# Patient Record
Sex: MALE | Race: WHITE | Employment: OTHER | URBAN - NONMETROPOLITAN AREA
[De-identification: names, ages, dates, MRNs, and addresses within clinical notes are randomized per-mention and may not be internally consistent; named-entity substitution may affect disease eponyms.]

---

## 2017-01-10 ENCOUNTER — HOSPITAL ENCOUNTER (OUTPATIENT)
Age: 72
Setting detail: OBSERVATION
Discharge: HOME | End: 2017-01-11
Attending: INTERNAL MEDICINE | Admitting: INTERNAL MEDICINE
Payer: MEDICARE

## 2017-01-10 LAB — TROPONIN: <0.01 NG/ML (ref 0–0.03)

## 2017-01-10 PROCEDURE — 2580000003 HC RX 258: Performed by: INTERNAL MEDICINE

## 2017-01-10 PROCEDURE — 96365 THER/PROPH/DIAG IV INF INIT: CPT

## 2017-01-10 PROCEDURE — G0378 HOSPITAL OBSERVATION PER HR: HCPCS

## 2017-01-10 PROCEDURE — 93005 ELECTROCARDIOGRAM TRACING: CPT

## 2017-01-10 PROCEDURE — 2500000003 HC RX 250 WO HCPCS: Performed by: INTERNAL MEDICINE

## 2017-01-10 PROCEDURE — 6370000000 HC RX 637 (ALT 250 FOR IP): Performed by: INTERNAL MEDICINE

## 2017-01-10 RX ORDER — BENAZEPRIL HYDROCHLORIDE 20 MG/1
20 TABLET ORAL NIGHTLY
COMMUNITY
Start: 2017-01-10

## 2017-01-10 RX ORDER — CITALOPRAM 20 MG/1
20 TABLET ORAL NIGHTLY
Status: DISCONTINUED | OUTPATIENT
Start: 2017-01-10 | End: 2017-01-11 | Stop reason: HOSPADM

## 2017-01-10 RX ORDER — MELOXICAM 15 MG/1
15 TABLET ORAL DAILY
COMMUNITY
Start: 2017-01-11

## 2017-01-10 RX ORDER — CLINDAMYCIN HYDROCHLORIDE 150 MG/1
150 CAPSULE ORAL EVERY 6 HOURS
Status: DISCONTINUED | OUTPATIENT
Start: 2017-01-10 | End: 2017-01-11

## 2017-01-10 RX ORDER — BENAZEPRIL HYDROCHLORIDE 10 MG/1
20 TABLET ORAL NIGHTLY
Status: DISCONTINUED | OUTPATIENT
Start: 2017-01-10 | End: 2017-01-10 | Stop reason: CLARIF

## 2017-01-10 RX ORDER — CITALOPRAM 20 MG/1
20 TABLET ORAL NIGHTLY
COMMUNITY
Start: 2017-01-10

## 2017-01-10 RX ORDER — ATORVASTATIN CALCIUM 80 MG/1
80 TABLET, FILM COATED ORAL NIGHTLY
Status: DISCONTINUED | OUTPATIENT
Start: 2017-01-10 | End: 2017-01-11 | Stop reason: HOSPADM

## 2017-01-10 RX ORDER — AMLODIPINE BESYLATE 5 MG/1
5 TABLET ORAL NIGHTLY
COMMUNITY
Start: 2017-01-10

## 2017-01-10 RX ORDER — CLINDAMYCIN HYDROCHLORIDE 150 MG/1
150 CAPSULE ORAL EVERY 6 HOURS
COMMUNITY
Start: 2017-01-11

## 2017-01-10 RX ORDER — TRAZODONE HYDROCHLORIDE 50 MG/1
50 TABLET ORAL NIGHTLY
Status: DISCONTINUED | OUTPATIENT
Start: 2017-01-10 | End: 2017-01-11 | Stop reason: HOSPADM

## 2017-01-10 RX ORDER — MELOXICAM 7.5 MG/1
15 TABLET ORAL DAILY
Status: DISCONTINUED | OUTPATIENT
Start: 2017-01-11 | End: 2017-01-11 | Stop reason: HOSPADM

## 2017-01-10 RX ORDER — METOPROLOL SUCCINATE 25 MG/1
25 TABLET, EXTENDED RELEASE ORAL NIGHTLY
Status: ON HOLD | COMMUNITY
Start: 2017-01-10 | End: 2017-01-11 | Stop reason: HOSPADM

## 2017-01-10 RX ORDER — AMLODIPINE BESYLATE 5 MG/1
5 TABLET ORAL NIGHTLY
Status: DISCONTINUED | OUTPATIENT
Start: 2017-01-10 | End: 2017-01-11 | Stop reason: HOSPADM

## 2017-01-10 RX ORDER — ROSUVASTATIN CALCIUM 10 MG/1
20 TABLET, COATED ORAL NIGHTLY
Status: DISCONTINUED | OUTPATIENT
Start: 2017-01-10 | End: 2017-01-10 | Stop reason: CLARIF

## 2017-01-10 RX ORDER — LISINOPRIL 20 MG/1
20 TABLET ORAL NIGHTLY
Status: DISCONTINUED | OUTPATIENT
Start: 2017-01-10 | End: 2017-01-11 | Stop reason: HOSPADM

## 2017-01-10 RX ORDER — METOPROLOL SUCCINATE 25 MG/1
25 TABLET, EXTENDED RELEASE ORAL NIGHTLY
Status: DISCONTINUED | OUTPATIENT
Start: 2017-01-10 | End: 2017-01-11

## 2017-01-10 RX ORDER — HYDROCHLOROTHIAZIDE 12.5 MG/1
12.5 TABLET ORAL NIGHTLY
COMMUNITY
Start: 2017-01-10

## 2017-01-10 RX ORDER — ROSUVASTATIN CALCIUM 20 MG/1
20 TABLET, COATED ORAL NIGHTLY
COMMUNITY
Start: 2017-01-10

## 2017-01-10 RX ORDER — TRAZODONE HYDROCHLORIDE 50 MG/1
50 TABLET ORAL NIGHTLY
COMMUNITY

## 2017-01-10 RX ORDER — HYDROCHLOROTHIAZIDE 12.5 MG/1
12.5 CAPSULE, GELATIN COATED ORAL NIGHTLY
Status: DISCONTINUED | OUTPATIENT
Start: 2017-01-10 | End: 2017-01-11 | Stop reason: HOSPADM

## 2017-01-10 RX ADMIN — METOPROLOL SUCCINATE 25 MG: 25 TABLET, EXTENDED RELEASE ORAL at 23:02

## 2017-01-10 RX ADMIN — CITALOPRAM HYDROBROMIDE 20 MG: 20 TABLET ORAL at 23:03

## 2017-01-10 RX ADMIN — AMLODIPINE BESYLATE 5 MG: 5 TABLET ORAL at 23:03

## 2017-01-10 RX ADMIN — TRAZODONE HYDROCHLORIDE 50 MG: 50 TABLET ORAL at 23:02

## 2017-01-10 RX ADMIN — DILTIAZEM HYDROCHLORIDE 5 MG/HR: 5 INJECTION INTRAVENOUS at 23:01

## 2017-01-10 RX ADMIN — ATORVASTATIN CALCIUM 80 MG: 80 TABLET, FILM COATED ORAL at 23:02

## 2017-01-10 RX ADMIN — HYDROCHLOROTHIAZIDE 12.5 MG: 12.5 CAPSULE ORAL at 23:03

## 2017-01-10 RX ADMIN — CLINDAMYCIN HYDROCHLORIDE 150 MG: 150 CAPSULE ORAL at 22:45

## 2017-01-10 RX ADMIN — LISINOPRIL 20 MG: 20 TABLET ORAL at 23:02

## 2017-01-10 ASSESSMENT — PAIN SCALES - GENERAL: PAINLEVEL_OUTOF10: 0

## 2017-01-11 VITALS
SYSTOLIC BLOOD PRESSURE: 100 MMHG | WEIGHT: 239.8 LBS | OXYGEN SATURATION: 91 % | HEART RATE: 66 BPM | RESPIRATION RATE: 16 BRPM | TEMPERATURE: 97 F | DIASTOLIC BLOOD PRESSURE: 58 MMHG

## 2017-01-11 PROBLEM — I11.9 HYPERTENSIVE CARDIOVASCULAR DISEASE: Status: ACTIVE | Noted: 2017-01-11

## 2017-01-11 PROBLEM — Z82.49 FAMILY HISTORY OF EARLY CAD: Status: ACTIVE | Noted: 2017-01-11

## 2017-01-11 PROBLEM — I48.0 PAF (PAROXYSMAL ATRIAL FIBRILLATION) (HCC): Status: ACTIVE | Noted: 2017-01-11

## 2017-01-11 LAB
ALBUMIN SERPL-MCNC: 3.5 G/DL (ref 3.5–5.2)
ALP BLD-CCNC: 61 U/L (ref 40–130)
ALT SERPL-CCNC: 22 U/L (ref 5–41)
ANION GAP SERPL CALCULATED.3IONS-SCNC: 14 MMOL/L (ref 7–19)
AST SERPL-CCNC: 15 U/L (ref 5–40)
BASOPHILS ABSOLUTE: 0 K/UL (ref 0–0.2)
BASOPHILS RELATIVE PERCENT: 0.2 % (ref 0–1)
BILIRUB SERPL-MCNC: 0.6 MG/DL (ref 0.2–1.2)
BUN BLDV-MCNC: 15 MG/DL (ref 8–23)
CALCIUM SERPL-MCNC: 8.6 MG/DL (ref 8.8–10.2)
CHLORIDE BLD-SCNC: 97 MMOL/L (ref 98–111)
CHOLESTEROL, TOTAL: 165 MG/DL (ref 160–199)
CO2: 25 MMOL/L (ref 22–29)
CREAT SERPL-MCNC: 1 MG/DL (ref 0.5–1.2)
EOSINOPHILS ABSOLUTE: 0.2 K/UL (ref 0–0.6)
EOSINOPHILS RELATIVE PERCENT: 3.2 % (ref 0–5)
GFR NON-AFRICAN AMERICAN: >60
GLOBULIN: 2.8 G/DL
GLUCOSE BLD-MCNC: 118 MG/DL (ref 74–109)
HCT VFR BLD CALC: 41.3 % (ref 42–52)
HDLC SERPL-MCNC: 48 MG/DL (ref 55–121)
HEMOGLOBIN: 13.6 G/DL (ref 14–18)
INR BLD: 0.97 (ref 0.88–1.18)
LDL CHOLESTEROL CALCULATED: 85 MG/DL
LV EF: 60 %
LVEF MODALITY: NORMAL
LYMPHOCYTES ABSOLUTE: 1.6 K/UL (ref 1.1–4.5)
LYMPHOCYTES RELATIVE PERCENT: 28.3 % (ref 20–40)
MCH RBC QN AUTO: 29.2 PG (ref 27–31)
MCHC RBC AUTO-ENTMCNC: 32.9 G/DL (ref 33–37)
MCV RBC AUTO: 88.8 FL (ref 80–94)
MONOCYTES ABSOLUTE: 0.7 K/UL (ref 0–0.9)
MONOCYTES RELATIVE PERCENT: 12.1 % (ref 0–10)
NEUTROPHILS ABSOLUTE: 3.2 K/UL (ref 1.5–7.5)
NEUTROPHILS RELATIVE PERCENT: 55.8 % (ref 50–65)
PDW BLD-RTO: 14 % (ref 11.5–14.5)
PLATELET # BLD: 212 K/UL (ref 130–400)
PMV BLD AUTO: 10.7 FL (ref 7.4–10.4)
POTASSIUM SERPL-SCNC: 3.6 MMOL/L (ref 3.5–5)
PROTHROMBIN TIME: 12.9 SEC (ref 12–14.6)
RBC # BLD: 4.65 M/UL (ref 4.7–6.1)
SODIUM BLD-SCNC: 136 MMOL/L (ref 136–145)
T4 FREE: 1.2 NG/ML (ref 0.9–1.7)
TOTAL PROTEIN: 6.3 G/DL (ref 6.6–8.7)
TRIGL SERPL-MCNC: 162 MG/DL (ref 150–199)
TROPONIN: <0.01 NG/ML (ref 0–0.03)
TSH SERPL DL<=0.05 MIU/L-ACNC: 1 UIU/ML (ref 0.27–4.2)
WBC # BLD: 5.7 K/UL (ref 4.8–10.8)

## 2017-01-11 PROCEDURE — 80053 COMPREHEN METABOLIC PANEL: CPT

## 2017-01-11 PROCEDURE — 84439 ASSAY OF FREE THYROXINE: CPT

## 2017-01-11 PROCEDURE — 84484 ASSAY OF TROPONIN QUANT: CPT

## 2017-01-11 PROCEDURE — 93005 ELECTROCARDIOGRAM TRACING: CPT

## 2017-01-11 PROCEDURE — 36415 COLL VENOUS BLD VENIPUNCTURE: CPT

## 2017-01-11 PROCEDURE — 93306 TTE W/DOPPLER COMPLETE: CPT

## 2017-01-11 PROCEDURE — 85610 PROTHROMBIN TIME: CPT

## 2017-01-11 PROCEDURE — 85025 COMPLETE CBC W/AUTO DIFF WBC: CPT

## 2017-01-11 PROCEDURE — 99999 PR OFFICE/OUTPT VISIT,PROCEDURE ONLY: CPT | Performed by: INTERNAL MEDICINE

## 2017-01-11 PROCEDURE — 96372 THER/PROPH/DIAG INJ SC/IM: CPT

## 2017-01-11 PROCEDURE — 84443 ASSAY THYROID STIM HORMONE: CPT

## 2017-01-11 PROCEDURE — G0378 HOSPITAL OBSERVATION PER HR: HCPCS

## 2017-01-11 PROCEDURE — 80061 LIPID PANEL: CPT

## 2017-01-11 PROCEDURE — 6360000002 HC RX W HCPCS: Performed by: INTERNAL MEDICINE

## 2017-01-11 PROCEDURE — 6370000000 HC RX 637 (ALT 250 FOR IP): Performed by: INTERNAL MEDICINE

## 2017-01-11 PROCEDURE — 99220 PR INITIAL OBSERVATION CARE/DAY 70 MINUTES: CPT | Performed by: INTERNAL MEDICINE

## 2017-01-11 RX ORDER — SODIUM CHLORIDE 0.9 % (FLUSH) 0.9 %
10 SYRINGE (ML) INJECTION PRN
Status: DISCONTINUED | OUTPATIENT
Start: 2017-01-11 | End: 2017-01-11 | Stop reason: HOSPADM

## 2017-01-11 RX ORDER — ACETAMINOPHEN 325 MG/1
650 TABLET ORAL EVERY 4 HOURS PRN
Status: DISCONTINUED | OUTPATIENT
Start: 2017-01-11 | End: 2017-01-11 | Stop reason: HOSPADM

## 2017-01-11 RX ORDER — ONDANSETRON 2 MG/ML
4 INJECTION INTRAMUSCULAR; INTRAVENOUS EVERY 6 HOURS PRN
Status: DISCONTINUED | OUTPATIENT
Start: 2017-01-11 | End: 2017-01-11 | Stop reason: HOSPADM

## 2017-01-11 RX ORDER — METOPROLOL SUCCINATE 50 MG/1
50 TABLET, EXTENDED RELEASE ORAL NIGHTLY
Status: DISCONTINUED | OUTPATIENT
Start: 2017-01-11 | End: 2017-01-11 | Stop reason: HOSPADM

## 2017-01-11 RX ORDER — CLINDAMYCIN HYDROCHLORIDE 150 MG/1
300 CAPSULE ORAL EVERY 6 HOURS SCHEDULED
Status: DISCONTINUED | OUTPATIENT
Start: 2017-01-11 | End: 2017-01-11 | Stop reason: HOSPADM

## 2017-01-11 RX ORDER — SODIUM CHLORIDE 0.9 % (FLUSH) 0.9 %
10 SYRINGE (ML) INJECTION EVERY 12 HOURS SCHEDULED
Status: DISCONTINUED | OUTPATIENT
Start: 2017-01-11 | End: 2017-01-11 | Stop reason: HOSPADM

## 2017-01-11 RX ORDER — METOPROLOL SUCCINATE 25 MG/1
50 TABLET, EXTENDED RELEASE ORAL NIGHTLY
Qty: 30 TABLET | Refills: 3 | Status: CANCELLED | OUTPATIENT
Start: 2017-01-11

## 2017-01-11 RX ADMIN — ENOXAPARIN SODIUM 40 MG: 40 INJECTION SUBCUTANEOUS at 09:05

## 2017-01-11 RX ADMIN — CLINDAMYCIN HYDROCHLORIDE 300 MG: 150 CAPSULE ORAL at 12:00

## 2017-01-11 RX ADMIN — CLINDAMYCIN HYDROCHLORIDE 300 MG: 150 CAPSULE ORAL at 05:53

## 2017-01-11 RX ADMIN — MELOXICAM 15 MG: 7.5 TABLET ORAL at 09:04

## 2017-01-11 ASSESSMENT — PAIN SCALES - GENERAL
PAINLEVEL_OUTOF10: 0
PAINLEVEL_OUTOF10: 0
PAINLEVEL_OUTOF10: 2

## 2017-01-12 ENCOUNTER — TELEPHONE (OUTPATIENT)
Dept: CARDIOLOGY | Age: 72
End: 2017-01-12

## 2017-01-12 RX ORDER — METOPROLOL SUCCINATE 50 MG/1
50 TABLET, EXTENDED RELEASE ORAL DAILY
Qty: 30 TABLET | Refills: 3 | Status: SHIPPED | OUTPATIENT
Start: 2017-01-12 | End: 2017-05-01 | Stop reason: SDUPTHER

## 2017-01-17 LAB
EKG P AXIS: 56 DEGREES
EKG P-R INTERVAL: 142 MS
EKG Q-T INTERVAL: 428 MS
EKG QRS DURATION: 84 MS
EKG QTC CALCULATION (BAZETT): 438 MS
EKG T AXIS: 18 DEGREES

## 2017-05-01 RX ORDER — METOPROLOL SUCCINATE 50 MG/1
TABLET, EXTENDED RELEASE ORAL
Qty: 30 TABLET | Refills: 0 | Status: SHIPPED | OUTPATIENT
Start: 2017-05-01

## 2024-04-17 ENCOUNTER — HOSPITAL ENCOUNTER (OUTPATIENT)
Dept: PREADMISSION TESTING | Age: 79
Discharge: HOME | End: 2024-04-21
Payer: MEDICARE

## 2024-04-17 VITALS — BODY MASS INDEX: 33.74 KG/M2 | HEIGHT: 71 IN | WEIGHT: 241 LBS

## 2024-04-17 LAB
ABO/RH: NORMAL
ALBUMIN SERPL-MCNC: 4.5 G/DL (ref 3.5–5.2)
ALP SERPL-CCNC: 70 U/L (ref 40–130)
ALT SERPL-CCNC: 20 U/L (ref 5–41)
ANION GAP SERPL CALCULATED.3IONS-SCNC: 15 MMOL/L (ref 7–19)
ANTIBODY SCREEN: NORMAL
APTT PPP: 38.7 SEC (ref 26–36.2)
AST SERPL-CCNC: 16 U/L (ref 5–40)
BILIRUB SERPL-MCNC: 0.6 MG/DL (ref 0.2–1.2)
BILIRUB UR QL STRIP: NEGATIVE
BUN SERPL-MCNC: 20 MG/DL (ref 8–23)
CALCIUM SERPL-MCNC: 9.4 MG/DL (ref 8.8–10.2)
CHLORIDE SERPL-SCNC: 102 MMOL/L (ref 98–111)
CLARITY UR: CLEAR
CO2 SERPL-SCNC: 25 MMOL/L (ref 22–29)
COLOR UR: YELLOW
CREAT SERPL-MCNC: 1.3 MG/DL (ref 0.5–1.2)
ERYTHROCYTE [DISTWIDTH] IN BLOOD BY AUTOMATED COUNT: 14.8 % (ref 11.5–14.5)
GLUCOSE SERPL-MCNC: 103 MG/DL (ref 74–109)
GLUCOSE UR STRIP.AUTO-MCNC: NEGATIVE MG/DL
HCT VFR BLD AUTO: 45.2 % (ref 42–52)
HGB BLD-MCNC: 14.7 G/DL (ref 14–18)
HGB UR STRIP.AUTO-MCNC: NEGATIVE MG/L
INR PPP: 1.12 (ref 0.88–1.18)
KETONES UR STRIP.AUTO-MCNC: NEGATIVE MG/DL
LEUKOCYTE ESTERASE UR QL STRIP.AUTO: NEGATIVE
MCH RBC QN AUTO: 29.8 PG (ref 27–31)
MCHC RBC AUTO-ENTMCNC: 32.5 G/DL (ref 33–37)
MCV RBC AUTO: 91.7 FL (ref 80–94)
MRSA DNA SPEC QL NAA+PROBE: NOT DETECTED
NITRITE UR QL STRIP.AUTO: NEGATIVE
PH UR STRIP.AUTO: 7.5 [PH] (ref 5–8)
PLATELET # BLD AUTO: 191 K/UL (ref 130–400)
PMV BLD AUTO: 10.5 FL (ref 9.4–12.4)
POTASSIUM SERPL-SCNC: 5.3 MMOL/L (ref 3.5–5)
PROT SERPL-MCNC: 7.1 G/DL (ref 6.6–8.7)
PROT UR STRIP.AUTO-MCNC: NEGATIVE MG/DL
PROTHROMBIN TIME: 14.1 SEC (ref 12–14.6)
RBC # BLD AUTO: 4.93 M/UL (ref 4.7–6.1)
SODIUM SERPL-SCNC: 142 MMOL/L (ref 136–145)
SP GR UR STRIP.AUTO: 1.01 (ref 1–1.03)
UROBILINOGEN UR STRIP.AUTO-MCNC: 0.2 E.U./DL
WBC # BLD AUTO: 6.1 K/UL (ref 4.8–10.8)

## 2024-04-17 PROCEDURE — 85027 COMPLETE CBC AUTOMATED: CPT

## 2024-04-17 PROCEDURE — 86901 BLOOD TYPING SEROLOGIC RH(D): CPT

## 2024-04-17 PROCEDURE — 87641 MR-STAPH DNA AMP PROBE: CPT

## 2024-04-17 PROCEDURE — 86900 BLOOD TYPING SEROLOGIC ABO: CPT

## 2024-04-17 PROCEDURE — 81003 URINALYSIS AUTO W/O SCOPE: CPT

## 2024-04-17 PROCEDURE — 80053 COMPREHEN METABOLIC PANEL: CPT

## 2024-04-17 PROCEDURE — 85610 PROTHROMBIN TIME: CPT

## 2024-04-17 PROCEDURE — 86850 RBC ANTIBODY SCREEN: CPT

## 2024-04-17 PROCEDURE — 85730 THROMBOPLASTIN TIME PARTIAL: CPT

## 2024-04-17 RX ORDER — DEXAMETHASONE SODIUM PHOSPHATE 10 MG/ML
10 INJECTION, SOLUTION INTRAMUSCULAR; INTRAVENOUS ONCE
OUTPATIENT
Start: 2024-05-06

## 2024-04-17 RX ORDER — ROSUVASTATIN CALCIUM 10 MG/1
10 TABLET, COATED ORAL DAILY
COMMUNITY

## 2024-04-17 RX ORDER — AMIODARONE HYDROCHLORIDE 200 MG/1
200 TABLET ORAL DAILY
COMMUNITY

## 2024-04-17 RX ORDER — OXYCODONE HCL 10 MG/1
10 TABLET, FILM COATED, EXTENDED RELEASE ORAL ONCE
OUTPATIENT
Start: 2024-05-06

## 2024-04-17 RX ORDER — METOPROLOL SUCCINATE 25 MG/1
25 TABLET, EXTENDED RELEASE ORAL DAILY
COMMUNITY

## 2024-04-17 RX ORDER — CITALOPRAM HYDROBROMIDE 10 MG/1
5 TABLET ORAL DAILY
COMMUNITY

## 2024-04-17 RX ORDER — BENAZEPRIL HYDROCHLORIDE 10 MG/1
10 TABLET ORAL 2 TIMES DAILY
COMMUNITY

## 2024-04-17 RX ORDER — ACETAMINOPHEN 500 MG
1000 TABLET ORAL ONCE
OUTPATIENT
Start: 2024-05-06

## 2024-04-17 RX ORDER — PREGABALIN 75 MG/1
75 CAPSULE ORAL ONCE
OUTPATIENT
Start: 2024-05-06

## 2024-04-17 RX ORDER — EZETIMIBE 10 MG/1
10 TABLET ORAL DAILY
COMMUNITY

## 2024-04-17 RX ORDER — TRAZODONE HYDROCHLORIDE 50 MG/1
50 TABLET ORAL NIGHTLY
COMMUNITY

## 2024-04-17 RX ORDER — FUROSEMIDE 40 MG/1
40 TABLET ORAL DAILY
COMMUNITY

## 2024-04-17 RX ORDER — CELECOXIB 200 MG/1
200 CAPSULE ORAL ONCE
OUTPATIENT
Start: 2024-05-06

## 2024-04-17 NOTE — DISCHARGE INSTRUCTIONS
BACTROBAN OINTMENT for the NARES    A script for Bactroban ointment has been call to your pharmacy or was given to you in written form by your surgeon.  The guidelines for the ointment use are as follows:    1)  Start using the ointment 7 days before your surgery date    2)  Use the ointment two times a day - morning and night    3)  Place the ointment on a Q-tip and swirl up in your nose making sure you cover completely       the skin just inside of each nostril.  Use one end of the Q-tip for each nostril.           Chlorhexidine Gluconate 4% Solution    Patient should shower with this soap a minimum of 3 consecutive showers (2 nights before surgery, the night before surgery and the morning of surgery) washing from the neck down (avoiding contact with genitalia).      DO NOT WASH YOUR HAIR OR FACE WITH THIS SOAP.  When washing with this soap, apply enough to suds up the body thoroughly, turn the water away from your body and allow the soap suds to remain on the body for 2 full minutes, then rinse body completely.      After using this soap on the body, please do not apply powders or lotions to your body.  After the shower the night before surgery, please dry off with a new towel, sleep in new freshly laundered pj's, and change your bed linen before going to sleep.      IF YOU HAVE A PET IN YOUR HOME, please do not allow your pet to sleep in the bed with you after you have showered with your surgery prep soap.     Please remember that it is not recommended to allow your pet to sleep with you post op, until your incision has healed.  This can increase your risk of post op infection.            The morning of surgery, you may take all your prescribed medications with a sip of water.  Any exceptions to this would be listed below:     DO NOT TAKE YOUR BENAZEPRIL THE MORNING OF SURGERY.            PREOPERATIVE GUIDELINES WHEN RECEIVING ANESTHESIA    Do not eat or drink anything after midnight, the night before your

## 2024-05-06 ENCOUNTER — ANESTHESIA (OUTPATIENT)
Dept: OPERATING ROOM | Age: 79
End: 2024-05-06
Payer: MEDICARE

## 2024-05-06 ENCOUNTER — ANESTHESIA EVENT (OUTPATIENT)
Dept: OPERATING ROOM | Age: 79
End: 2024-05-06
Payer: MEDICARE

## 2024-05-06 ENCOUNTER — APPOINTMENT (OUTPATIENT)
Dept: GENERAL RADIOLOGY | Age: 79
End: 2024-05-06
Attending: ORTHOPAEDIC SURGERY
Payer: MEDICARE

## 2024-05-06 ENCOUNTER — HOSPITAL ENCOUNTER (OUTPATIENT)
Age: 79
Setting detail: OBSERVATION
Discharge: HOME HEALTH | End: 2024-05-07
Attending: ORTHOPAEDIC SURGERY | Admitting: ORTHOPAEDIC SURGERY
Payer: MEDICARE

## 2024-05-06 DIAGNOSIS — M16.11 PRIMARY OSTEOARTHRITIS OF RIGHT HIP: Primary | ICD-10-CM

## 2024-05-06 PROBLEM — M16.9 DEGENERATIVE JOINT DISEASE (DJD) OF HIP: Status: ACTIVE | Noted: 2024-05-06

## 2024-05-06 LAB
ABO/RH: NORMAL
ANTIBODY SCREEN: NORMAL

## 2024-05-06 PROCEDURE — 2720000010 HC SURG SUPPLY STERILE: Performed by: ORTHOPAEDIC SURGERY

## 2024-05-06 PROCEDURE — 3700000000 HC ANESTHESIA ATTENDED CARE: Performed by: ORTHOPAEDIC SURGERY

## 2024-05-06 PROCEDURE — 2500000003 HC RX 250 WO HCPCS: Performed by: NURSE ANESTHETIST, CERTIFIED REGISTERED

## 2024-05-06 PROCEDURE — 6370000000 HC RX 637 (ALT 250 FOR IP): Performed by: ORTHOPAEDIC SURGERY

## 2024-05-06 PROCEDURE — 3600000005 HC SURGERY LEVEL 5 BASE: Performed by: ORTHOPAEDIC SURGERY

## 2024-05-06 PROCEDURE — 6360000002 HC RX W HCPCS: Performed by: ORTHOPAEDIC SURGERY

## 2024-05-06 PROCEDURE — 36415 COLL VENOUS BLD VENIPUNCTURE: CPT

## 2024-05-06 PROCEDURE — 73502 X-RAY EXAM HIP UNI 2-3 VIEWS: CPT

## 2024-05-06 PROCEDURE — 86900 BLOOD TYPING SEROLOGIC ABO: CPT

## 2024-05-06 PROCEDURE — G0378 HOSPITAL OBSERVATION PER HR: HCPCS

## 2024-05-06 PROCEDURE — C1713 ANCHOR/SCREW BN/BN,TIS/BN: HCPCS | Performed by: ORTHOPAEDIC SURGERY

## 2024-05-06 PROCEDURE — C9290 INJ, BUPIVACAINE LIPOSOME: HCPCS | Performed by: ORTHOPAEDIC SURGERY

## 2024-05-06 PROCEDURE — 2580000003 HC RX 258: Performed by: ANESTHESIOLOGY

## 2024-05-06 PROCEDURE — 3600000015 HC SURGERY LEVEL 5 ADDTL 15MIN: Performed by: ORTHOPAEDIC SURGERY

## 2024-05-06 PROCEDURE — 3700000001 HC ADD 15 MINUTES (ANESTHESIA): Performed by: ORTHOPAEDIC SURGERY

## 2024-05-06 PROCEDURE — 2580000003 HC RX 258: Performed by: NURSE ANESTHETIST, CERTIFIED REGISTERED

## 2024-05-06 PROCEDURE — 2580000003 HC RX 258: Performed by: ORTHOPAEDIC SURGERY

## 2024-05-06 PROCEDURE — 6360000002 HC RX W HCPCS: Performed by: NURSE ANESTHETIST, CERTIFIED REGISTERED

## 2024-05-06 PROCEDURE — C1776 JOINT DEVICE (IMPLANTABLE): HCPCS | Performed by: ORTHOPAEDIC SURGERY

## 2024-05-06 PROCEDURE — 7100000001 HC PACU RECOVERY - ADDTL 15 MIN: Performed by: ORTHOPAEDIC SURGERY

## 2024-05-06 PROCEDURE — 94150 VITAL CAPACITY TEST: CPT

## 2024-05-06 PROCEDURE — 7100000000 HC PACU RECOVERY - FIRST 15 MIN: Performed by: ORTHOPAEDIC SURGERY

## 2024-05-06 PROCEDURE — 6370000000 HC RX 637 (ALT 250 FOR IP): Performed by: STUDENT IN AN ORGANIZED HEALTH CARE EDUCATION/TRAINING PROGRAM

## 2024-05-06 PROCEDURE — 86901 BLOOD TYPING SEROLOGIC RH(D): CPT

## 2024-05-06 PROCEDURE — 94760 N-INVAS EAR/PLS OXIMETRY 1: CPT

## 2024-05-06 PROCEDURE — 86850 RBC ANTIBODY SCREEN: CPT

## 2024-05-06 PROCEDURE — 2709999900 HC NON-CHARGEABLE SUPPLY: Performed by: ORTHOPAEDIC SURGERY

## 2024-05-06 DEVICE — CUP ACET DIA54MM HIP GRIPTION PRI CEMENTLESS FIX SECT SER: Type: IMPLANTABLE DEVICE | Site: HIP | Status: FUNCTIONAL

## 2024-05-06 DEVICE — LINER ACET OD54MM ID36MM HIP ALTRX PINN: Type: IMPLANTABLE DEVICE | Site: HIP | Status: FUNCTIONAL

## 2024-05-06 DEVICE — STEM FEM SZ 8 L111MM 12/14 TAPR STD OFFSET HIP DUOFIX CLLRD: Type: IMPLANTABLE DEVICE | Site: HIP | Status: FUNCTIONAL

## 2024-05-06 DEVICE — HEAD FEM DIA36MM +1.5MM OFFSET 12/14 TAPR HIP CERAMIC: Type: IMPLANTABLE DEVICE | Site: HIP | Status: FUNCTIONAL

## 2024-05-06 RX ORDER — DIPHENHYDRAMINE HCL 25 MG
25 TABLET ORAL EVERY 6 HOURS PRN
Status: DISCONTINUED | OUTPATIENT
Start: 2024-05-06 | End: 2024-05-07 | Stop reason: HOSPADM

## 2024-05-06 RX ORDER — PREGABALIN 75 MG/1
75 CAPSULE ORAL ONCE
Status: COMPLETED | OUTPATIENT
Start: 2024-05-06 | End: 2024-05-06

## 2024-05-06 RX ORDER — SODIUM CHLORIDE 9 MG/ML
INJECTION, SOLUTION INTRAVENOUS CONTINUOUS
Status: DISCONTINUED | OUTPATIENT
Start: 2024-05-06 | End: 2024-05-07 | Stop reason: HOSPADM

## 2024-05-06 RX ORDER — ROSUVASTATIN CALCIUM 10 MG/1
10 TABLET, COATED ORAL DAILY
Status: DISCONTINUED | OUTPATIENT
Start: 2024-05-07 | End: 2024-05-07 | Stop reason: HOSPADM

## 2024-05-06 RX ORDER — SODIUM CHLORIDE 0.9 % (FLUSH) 0.9 %
5-40 SYRINGE (ML) INJECTION PRN
Status: CANCELLED | OUTPATIENT
Start: 2024-05-06

## 2024-05-06 RX ORDER — SODIUM CHLORIDE, SODIUM LACTATE, POTASSIUM CHLORIDE, CALCIUM CHLORIDE 600; 310; 30; 20 MG/100ML; MG/100ML; MG/100ML; MG/100ML
INJECTION, SOLUTION INTRAVENOUS CONTINUOUS
Status: DISCONTINUED | OUTPATIENT
Start: 2024-05-06 | End: 2024-05-06 | Stop reason: HOSPADM

## 2024-05-06 RX ORDER — TRANEXAMIC ACID 100 MG/ML
INJECTION, SOLUTION INTRAVENOUS PRN
Status: DISCONTINUED | OUTPATIENT
Start: 2024-05-06 | End: 2024-05-06 | Stop reason: SDUPTHER

## 2024-05-06 RX ORDER — FENTANYL CITRATE 50 UG/ML
INJECTION, SOLUTION INTRAMUSCULAR; INTRAVENOUS PRN
Status: DISCONTINUED | OUTPATIENT
Start: 2024-05-06 | End: 2024-05-06 | Stop reason: SDUPTHER

## 2024-05-06 RX ORDER — SODIUM CHLORIDE, SODIUM LACTATE, POTASSIUM CHLORIDE, AND CALCIUM CHLORIDE .6; .31; .03; .02 G/100ML; G/100ML; G/100ML; G/100ML
1000 INJECTION, SOLUTION INTRAVENOUS ONCE
Status: DISCONTINUED | OUTPATIENT
Start: 2024-05-06 | End: 2024-05-07 | Stop reason: HOSPADM

## 2024-05-06 RX ORDER — HYDROMORPHONE HYDROCHLORIDE 1 MG/ML
0.25 INJECTION, SOLUTION INTRAMUSCULAR; INTRAVENOUS; SUBCUTANEOUS
Status: DISCONTINUED | OUTPATIENT
Start: 2024-05-06 | End: 2024-05-07 | Stop reason: HOSPADM

## 2024-05-06 RX ORDER — OXYCODONE HCL 10 MG/1
10 TABLET, FILM COATED, EXTENDED RELEASE ORAL ONCE
Status: COMPLETED | OUTPATIENT
Start: 2024-05-06 | End: 2024-05-06

## 2024-05-06 RX ORDER — SODIUM CHLORIDE 0.9 % (FLUSH) 0.9 %
5-40 SYRINGE (ML) INJECTION EVERY 12 HOURS SCHEDULED
Status: CANCELLED | OUTPATIENT
Start: 2024-05-06

## 2024-05-06 RX ORDER — TRAMADOL HYDROCHLORIDE 50 MG/1
50 TABLET ORAL EVERY 6 HOURS
Status: DISCONTINUED | OUTPATIENT
Start: 2024-05-06 | End: 2024-05-07 | Stop reason: HOSPADM

## 2024-05-06 RX ORDER — SODIUM CHLORIDE 9 MG/ML
INJECTION, SOLUTION INTRAVENOUS PRN
Status: CANCELLED | OUTPATIENT
Start: 2024-05-06

## 2024-05-06 RX ORDER — LABETALOL HYDROCHLORIDE 5 MG/ML
10 INJECTION, SOLUTION INTRAVENOUS
Status: CANCELLED | OUTPATIENT
Start: 2024-05-06

## 2024-05-06 RX ORDER — DIPHENHYDRAMINE HYDROCHLORIDE 50 MG/ML
12.5 INJECTION INTRAMUSCULAR; INTRAVENOUS
Status: CANCELLED | OUTPATIENT
Start: 2024-05-06 | End: 2024-05-07

## 2024-05-06 RX ORDER — GLYCOPYRROLATE 0.2 MG/ML
INJECTION INTRAMUSCULAR; INTRAVENOUS PRN
Status: DISCONTINUED | OUTPATIENT
Start: 2024-05-06 | End: 2024-05-06 | Stop reason: SDUPTHER

## 2024-05-06 RX ORDER — CELECOXIB 200 MG/1
200 CAPSULE ORAL ONCE
Status: DISCONTINUED | OUTPATIENT
Start: 2024-05-06 | End: 2024-05-06

## 2024-05-06 RX ORDER — PROPOFOL 10 MG/ML
INJECTION, EMULSION INTRAVENOUS PRN
Status: DISCONTINUED | OUTPATIENT
Start: 2024-05-06 | End: 2024-05-06 | Stop reason: SDUPTHER

## 2024-05-06 RX ORDER — SODIUM CHLORIDE 0.9 % (FLUSH) 0.9 %
5-40 SYRINGE (ML) INJECTION PRN
Status: DISCONTINUED | OUTPATIENT
Start: 2024-05-06 | End: 2024-05-07 | Stop reason: HOSPADM

## 2024-05-06 RX ORDER — HYDROMORPHONE HYDROCHLORIDE 1 MG/ML
1 INJECTION, SOLUTION INTRAMUSCULAR; INTRAVENOUS; SUBCUTANEOUS
Status: DISCONTINUED | OUTPATIENT
Start: 2024-05-06 | End: 2024-05-07 | Stop reason: HOSPADM

## 2024-05-06 RX ORDER — ACETAMINOPHEN 325 MG/1
650 TABLET ORAL EVERY 6 HOURS
Status: DISCONTINUED | OUTPATIENT
Start: 2024-05-06 | End: 2024-05-07 | Stop reason: HOSPADM

## 2024-05-06 RX ORDER — FUROSEMIDE 40 MG/1
40 TABLET ORAL DAILY
Status: DISCONTINUED | OUTPATIENT
Start: 2024-05-07 | End: 2024-05-07 | Stop reason: HOSPADM

## 2024-05-06 RX ORDER — SODIUM CHLORIDE 9 MG/ML
INJECTION, SOLUTION INTRAVENOUS PRN
Status: DISCONTINUED | OUTPATIENT
Start: 2024-05-06 | End: 2024-05-07 | Stop reason: HOSPADM

## 2024-05-06 RX ORDER — SENNA AND DOCUSATE SODIUM 50; 8.6 MG/1; MG/1
1 TABLET, FILM COATED ORAL 2 TIMES DAILY
Status: DISCONTINUED | OUTPATIENT
Start: 2024-05-06 | End: 2024-05-07 | Stop reason: HOSPADM

## 2024-05-06 RX ORDER — OXYCODONE HYDROCHLORIDE 5 MG/1
5 TABLET ORAL EVERY 4 HOURS PRN
Status: DISCONTINUED | OUTPATIENT
Start: 2024-05-06 | End: 2024-05-07 | Stop reason: HOSPADM

## 2024-05-06 RX ORDER — POLYETHYLENE GLYCOL 3350 17 G/17G
17 POWDER, FOR SOLUTION ORAL 2 TIMES DAILY
Status: DISCONTINUED | OUTPATIENT
Start: 2024-05-06 | End: 2024-05-07 | Stop reason: HOSPADM

## 2024-05-06 RX ORDER — METOPROLOL SUCCINATE 25 MG/1
25 TABLET, EXTENDED RELEASE ORAL DAILY
Status: DISCONTINUED | OUTPATIENT
Start: 2024-05-07 | End: 2024-05-07 | Stop reason: HOSPADM

## 2024-05-06 RX ORDER — ONDANSETRON 2 MG/ML
INJECTION INTRAMUSCULAR; INTRAVENOUS PRN
Status: DISCONTINUED | OUTPATIENT
Start: 2024-05-06 | End: 2024-05-06 | Stop reason: SDUPTHER

## 2024-05-06 RX ORDER — DEXAMETHASONE SODIUM PHOSPHATE 10 MG/ML
10 INJECTION, SOLUTION INTRAMUSCULAR; INTRAVENOUS ONCE
Status: DISCONTINUED | OUTPATIENT
Start: 2024-05-06 | End: 2024-05-06 | Stop reason: HOSPADM

## 2024-05-06 RX ORDER — 0.9 % SODIUM CHLORIDE 0.9 %
500 INTRAVENOUS SOLUTION INTRAVENOUS PRN
Status: COMPLETED | OUTPATIENT
Start: 2024-05-06 | End: 2024-05-07

## 2024-05-06 RX ORDER — HYDRALAZINE HYDROCHLORIDE 20 MG/ML
10 INJECTION INTRAMUSCULAR; INTRAVENOUS
Status: CANCELLED | OUTPATIENT
Start: 2024-05-06

## 2024-05-06 RX ORDER — HYDROMORPHONE HYDROCHLORIDE 1 MG/ML
0.25 INJECTION, SOLUTION INTRAMUSCULAR; INTRAVENOUS; SUBCUTANEOUS EVERY 5 MIN PRN
Status: DISCONTINUED | OUTPATIENT
Start: 2024-05-06 | End: 2024-05-06 | Stop reason: HOSPADM

## 2024-05-06 RX ORDER — SODIUM CHLORIDE 0.9 % (FLUSH) 0.9 %
5-40 SYRINGE (ML) INJECTION EVERY 12 HOURS SCHEDULED
Status: DISCONTINUED | OUTPATIENT
Start: 2024-05-06 | End: 2024-05-07 | Stop reason: HOSPADM

## 2024-05-06 RX ORDER — NALOXONE HYDROCHLORIDE 0.4 MG/ML
INJECTION, SOLUTION INTRAMUSCULAR; INTRAVENOUS; SUBCUTANEOUS PRN
Status: CANCELLED | OUTPATIENT
Start: 2024-05-06

## 2024-05-06 RX ORDER — DIPHENHYDRAMINE HYDROCHLORIDE 50 MG/ML
25 INJECTION INTRAMUSCULAR; INTRAVENOUS EVERY 6 HOURS PRN
Status: DISCONTINUED | OUTPATIENT
Start: 2024-05-06 | End: 2024-05-07 | Stop reason: HOSPADM

## 2024-05-06 RX ORDER — OXYCODONE HYDROCHLORIDE 10 MG/1
10 TABLET ORAL EVERY 4 HOURS PRN
Status: DISCONTINUED | OUTPATIENT
Start: 2024-05-06 | End: 2024-05-07 | Stop reason: HOSPADM

## 2024-05-06 RX ORDER — TAMSULOSIN HYDROCHLORIDE 0.4 MG/1
0.4 CAPSULE ORAL DAILY
Status: DISCONTINUED | OUTPATIENT
Start: 2024-05-06 | End: 2024-05-07 | Stop reason: HOSPADM

## 2024-05-06 RX ORDER — EZETIMIBE 10 MG/1
10 TABLET ORAL DAILY
Status: DISCONTINUED | OUTPATIENT
Start: 2024-05-07 | End: 2024-05-07 | Stop reason: HOSPADM

## 2024-05-06 RX ORDER — CITALOPRAM 20 MG/1
5 TABLET ORAL DAILY
Status: DISCONTINUED | OUTPATIENT
Start: 2024-05-07 | End: 2024-05-07 | Stop reason: HOSPADM

## 2024-05-06 RX ORDER — ACETAMINOPHEN 500 MG
1000 TABLET ORAL ONCE
Status: COMPLETED | OUTPATIENT
Start: 2024-05-06 | End: 2024-05-06

## 2024-05-06 RX ORDER — IPRATROPIUM BROMIDE AND ALBUTEROL SULFATE 2.5; .5 MG/3ML; MG/3ML
1 SOLUTION RESPIRATORY (INHALATION)
Status: CANCELLED | OUTPATIENT
Start: 2024-05-06 | End: 2024-05-07

## 2024-05-06 RX ORDER — LIDOCAINE HYDROCHLORIDE 10 MG/ML
INJECTION, SOLUTION EPIDURAL; INFILTRATION; INTRACAUDAL; PERINEURAL PRN
Status: DISCONTINUED | OUTPATIENT
Start: 2024-05-06 | End: 2024-05-06 | Stop reason: SDUPTHER

## 2024-05-06 RX ORDER — ROCURONIUM BROMIDE 10 MG/ML
INJECTION, SOLUTION INTRAVENOUS PRN
Status: DISCONTINUED | OUTPATIENT
Start: 2024-05-06 | End: 2024-05-06 | Stop reason: SDUPTHER

## 2024-05-06 RX ORDER — TRAZODONE HYDROCHLORIDE 50 MG/1
50 TABLET ORAL NIGHTLY
Status: DISCONTINUED | OUTPATIENT
Start: 2024-05-06 | End: 2024-05-07 | Stop reason: HOSPADM

## 2024-05-06 RX ORDER — EPHEDRINE SULFATE/0.9% NACL/PF 25 MG/5 ML
SYRINGE (ML) INTRAVENOUS PRN
Status: DISCONTINUED | OUTPATIENT
Start: 2024-05-06 | End: 2024-05-06 | Stop reason: SDUPTHER

## 2024-05-06 RX ORDER — ONDANSETRON 2 MG/ML
4 INJECTION INTRAMUSCULAR; INTRAVENOUS EVERY 6 HOURS PRN
Status: DISCONTINUED | OUTPATIENT
Start: 2024-05-06 | End: 2024-05-07 | Stop reason: HOSPADM

## 2024-05-06 RX ORDER — DEXAMETHASONE SODIUM PHOSPHATE 10 MG/ML
INJECTION, SOLUTION INTRAMUSCULAR; INTRAVENOUS PRN
Status: DISCONTINUED | OUTPATIENT
Start: 2024-05-06 | End: 2024-05-06 | Stop reason: SDUPTHER

## 2024-05-06 RX ORDER — HYDROMORPHONE HYDROCHLORIDE 1 MG/ML
0.5 INJECTION, SOLUTION INTRAMUSCULAR; INTRAVENOUS; SUBCUTANEOUS
Status: DISCONTINUED | OUTPATIENT
Start: 2024-05-06 | End: 2024-05-07 | Stop reason: HOSPADM

## 2024-05-06 RX ORDER — SODIUM CHLORIDE, SODIUM LACTATE, POTASSIUM CHLORIDE, CALCIUM CHLORIDE 600; 310; 30; 20 MG/100ML; MG/100ML; MG/100ML; MG/100ML
INJECTION, SOLUTION INTRAVENOUS CONTINUOUS PRN
Status: DISCONTINUED | OUTPATIENT
Start: 2024-05-06 | End: 2024-05-06 | Stop reason: SDUPTHER

## 2024-05-06 RX ORDER — AMIODARONE HYDROCHLORIDE 200 MG/1
200 TABLET ORAL DAILY
Status: DISCONTINUED | OUTPATIENT
Start: 2024-05-07 | End: 2024-05-07 | Stop reason: HOSPADM

## 2024-05-06 RX ORDER — HYDROMORPHONE HYDROCHLORIDE 1 MG/ML
0.5 INJECTION, SOLUTION INTRAMUSCULAR; INTRAVENOUS; SUBCUTANEOUS EVERY 5 MIN PRN
Status: DISCONTINUED | OUTPATIENT
Start: 2024-05-06 | End: 2024-05-06 | Stop reason: HOSPADM

## 2024-05-06 RX ORDER — ONDANSETRON 4 MG/1
4 TABLET, ORALLY DISINTEGRATING ORAL EVERY 8 HOURS PRN
Status: DISCONTINUED | OUTPATIENT
Start: 2024-05-06 | End: 2024-05-07 | Stop reason: HOSPADM

## 2024-05-06 RX ORDER — METOCLOPRAMIDE HYDROCHLORIDE 5 MG/ML
10 INJECTION INTRAMUSCULAR; INTRAVENOUS
Status: CANCELLED | OUTPATIENT
Start: 2024-05-06 | End: 2024-05-07

## 2024-05-06 RX ADMIN — SUGAMMADEX 200 MG: 100 INJECTION, SOLUTION INTRAVENOUS at 11:54

## 2024-05-06 RX ADMIN — FENTANYL CITRATE 50 MCG: 0.05 INJECTION, SOLUTION INTRAMUSCULAR; INTRAVENOUS at 10:49

## 2024-05-06 RX ADMIN — EPHEDRINE SULFATE 10 MG: 5 INJECTION INTRAVENOUS at 11:40

## 2024-05-06 RX ADMIN — EPHEDRINE SULFATE 10 MG: 5 INJECTION INTRAVENOUS at 11:16

## 2024-05-06 RX ADMIN — ROCURONIUM BROMIDE 50 MG: 10 INJECTION, SOLUTION INTRAVENOUS at 10:08

## 2024-05-06 RX ADMIN — EPHEDRINE SULFATE 15 MG: 5 INJECTION INTRAVENOUS at 10:25

## 2024-05-06 RX ADMIN — TRANEXAMIC ACID 1000 MG: 1 INJECTION, SOLUTION INTRAVENOUS at 11:39

## 2024-05-06 RX ADMIN — OXYCODONE HYDROCHLORIDE 10 MG: 10 TABLET, FILM COATED, EXTENDED RELEASE ORAL at 08:17

## 2024-05-06 RX ADMIN — EPHEDRINE SULFATE 10 MG: 5 INJECTION INTRAVENOUS at 10:39

## 2024-05-06 RX ADMIN — LIDOCAINE HYDROCHLORIDE 50 MG: 10 INJECTION, SOLUTION EPIDURAL; INFILTRATION; INTRACAUDAL; PERINEURAL at 10:03

## 2024-05-06 RX ADMIN — CEFAZOLIN 2000 MG: 2 INJECTION, POWDER, FOR SOLUTION INTRAMUSCULAR; INTRAVENOUS at 10:18

## 2024-05-06 RX ADMIN — SODIUM CHLORIDE: 9 INJECTION, SOLUTION INTRAVENOUS at 13:36

## 2024-05-06 RX ADMIN — PROPOFOL 100 MG: 10 INJECTION, EMULSION INTRAVENOUS at 10:08

## 2024-05-06 RX ADMIN — WATER 2000 MG: 1 INJECTION INTRAMUSCULAR; INTRAVENOUS; SUBCUTANEOUS at 18:02

## 2024-05-06 RX ADMIN — SENNOSIDES, DOCUSATE SODIUM 1 TABLET: 8.6; 5 TABLET ORAL at 20:24

## 2024-05-06 RX ADMIN — PHENYLEPHRINE HYDROCHLORIDE 200 MCG: 10 INJECTION INTRAVENOUS at 11:17

## 2024-05-06 RX ADMIN — ACETAMINOPHEN 650 MG: 325 TABLET ORAL at 14:13

## 2024-05-06 RX ADMIN — SODIUM CHLORIDE, SODIUM LACTATE, POTASSIUM CHLORIDE, AND CALCIUM CHLORIDE: 600; 310; 30; 20 INJECTION, SOLUTION INTRAVENOUS at 10:03

## 2024-05-06 RX ADMIN — TRAZODONE HYDROCHLORIDE 50 MG: 50 TABLET ORAL at 22:25

## 2024-05-06 RX ADMIN — SODIUM CHLORIDE, POTASSIUM CHLORIDE, SODIUM LACTATE AND CALCIUM CHLORIDE: 600; 310; 30; 20 INJECTION, SOLUTION INTRAVENOUS at 08:13

## 2024-05-06 RX ADMIN — APIXABAN 5 MG: 5 TABLET, FILM COATED ORAL at 20:24

## 2024-05-06 RX ADMIN — OXYCODONE 5 MG: 5 TABLET ORAL at 18:09

## 2024-05-06 RX ADMIN — SODIUM CHLORIDE: 9 INJECTION, SOLUTION INTRAVENOUS at 16:17

## 2024-05-06 RX ADMIN — ONDANSETRON 4 MG: 2 INJECTION INTRAMUSCULAR; INTRAVENOUS at 11:47

## 2024-05-06 RX ADMIN — DEXAMETHASONE SODIUM PHOSPHATE 10 MG: 10 INJECTION, SOLUTION INTRAMUSCULAR; INTRAVENOUS at 10:21

## 2024-05-06 RX ADMIN — TRAMADOL HYDROCHLORIDE 50 MG: 50 TABLET ORAL at 14:13

## 2024-05-06 RX ADMIN — TRAMADOL HYDROCHLORIDE 50 MG: 50 TABLET ORAL at 20:24

## 2024-05-06 RX ADMIN — PHENYLEPHRINE HYDROCHLORIDE 100 MCG: 10 INJECTION INTRAVENOUS at 11:34

## 2024-05-06 RX ADMIN — TAMSULOSIN HYDROCHLORIDE 0.4 MG: 0.4 CAPSULE ORAL at 14:13

## 2024-05-06 RX ADMIN — GLYCOPYRROLATE 0.2 MG: 0.2 INJECTION, SOLUTION INTRAMUSCULAR; INTRAVENOUS at 10:55

## 2024-05-06 RX ADMIN — PROPOFOL 50 MG: 10 INJECTION, EMULSION INTRAVENOUS at 10:15

## 2024-05-06 RX ADMIN — SENNOSIDES, DOCUSATE SODIUM 1 TABLET: 8.6; 5 TABLET ORAL at 14:13

## 2024-05-06 RX ADMIN — HYDROMORPHONE HYDROCHLORIDE 0.5 MG: 1 INJECTION, SOLUTION INTRAMUSCULAR; INTRAVENOUS; SUBCUTANEOUS at 10:03

## 2024-05-06 RX ADMIN — ACETAMINOPHEN 1000 MG: 500 TABLET ORAL at 08:17

## 2024-05-06 RX ADMIN — HYDROMORPHONE HYDROCHLORIDE 0.5 MG: 1 INJECTION, SOLUTION INTRAMUSCULAR; INTRAVENOUS; SUBCUTANEOUS at 10:08

## 2024-05-06 RX ADMIN — SODIUM CHLORIDE, SODIUM LACTATE, POTASSIUM CHLORIDE, AND CALCIUM CHLORIDE: 600; 310; 30; 20 INJECTION, SOLUTION INTRAVENOUS at 10:32

## 2024-05-06 RX ADMIN — TRANEXAMIC ACID 1000 MG: 1 INJECTION, SOLUTION INTRAVENOUS at 10:24

## 2024-05-06 RX ADMIN — PREGABALIN 75 MG: 75 CAPSULE ORAL at 08:17

## 2024-05-06 RX ADMIN — ACETAMINOPHEN 650 MG: 325 TABLET ORAL at 20:24

## 2024-05-06 RX ADMIN — PHENYLEPHRINE HYDROCHLORIDE 100 MCG: 10 INJECTION INTRAVENOUS at 11:39

## 2024-05-06 RX ADMIN — POLYETHYLENE GLYCOL 3350 17 G: 17 POWDER, FOR SOLUTION ORAL at 20:23

## 2024-05-06 RX ADMIN — FENTANYL CITRATE 50 MCG: 0.05 INJECTION, SOLUTION INTRAMUSCULAR; INTRAVENOUS at 11:02

## 2024-05-06 ASSESSMENT — PAIN - FUNCTIONAL ASSESSMENT
PAIN_FUNCTIONAL_ASSESSMENT: NONE - DENIES PAIN
PAIN_FUNCTIONAL_ASSESSMENT: NONE - DENIES PAIN

## 2024-05-06 ASSESSMENT — LIFESTYLE VARIABLES: SMOKING_STATUS: 0

## 2024-05-06 NOTE — DISCHARGE INSTR - DIET
Good nutrition is important when healing from an illness, injury, or surgery.  Follow any nutrition recommendations given to you during your hospital stay.   If you were given an oral nutrition supplement while in the hospital, continue to take this supplement at home.  You can take it with meals, in-between meals, and/or before bedtime. These supplements can be purchased at most local grocery stores, pharmacies, and chain [employer]stores.   If you have any questions about your diet or nutrition, call the hospital and ask for the dietitian.            Low carb / High protein

## 2024-05-06 NOTE — PROGRESS NOTES
4 Eyes Skin Assessment     NAME:  Aren Mack  YOB: 1945  MEDICAL RECORD NUMBER:  [id number]    The patient is being assessed for  Admission    I agree that at least one RN has performed a thorough Head to Toe Skin Assessment on the patient. ALL assessment sites listed below have been assessed.      Areas assessed by both nurses:    Head, Face, Ears, Shoulders, Back, Chest, Arms, Elbows, Hands, Sacrum. Buttock, Coccyx, Ischium, and Legs. Feet and Heels        Does the Patient have a Wound? No noted wound(s)       Israel Prevention initiated by RN: No  Wound Care Orders initiated by RN: No    Pressure Injury (Stage 3,4, Unstageable, DTI, NWPT, and Complex wounds) if present, place Wound referral order by RN under : No    New Ostomies, if present place, Ostomy referral order under : No     Nurse 1 eSignature: Electronically signed by Ngoc Pepper RN on 5/6/24 at 1:49 PM CDT    **SHARE this note so that the co-signing nurse can place an eSignature**    Nurse 2 eSignature: Electronically signed by Dayanara Sanchez RN on 5/6/24 at 5:23 PM CDT

## 2024-05-06 NOTE — OP NOTE
[address] [place] [address]                            OPERATIVE REPORT      PATIENT NAME: RAUL OHARA                  : 1945  MED REC NO: [id number]                          ROOM: 0534  ACCOUNT NO: [id number]                       ADMIT DATE: 2024  PROVIDER: Enrike Oneil MD      DATE OF PROCEDURE:  2024    SURGEON:  Enrike Oneil MD    PREOPERATIVE DIAGNOSES:    1. Primary osteoarthritis, right hip.  2. History of previous bilateral superior and inferior pubic rami fractures.  3. Atrial fibrillation, on Eliquis.    POSTOPERATIVE DIAGNOSES:    1. Primary osteoarthritis, right hip.  2. History of previous bilateral superior and inferior pubic rami fractures.  3. Atrial fibrillation, on Eliquis.    PROCEDURES:    1. Right total hip arthroplasty.  2. Use of computer navigation for assessment of leg length and component placement, code 0054T.  Please note that this is an increased complexity due to the fact that the patient was on Eliquis prior to surgery and we did not reverse this.  Also, the patient has some chronic kidney disease with baseline creatinine of 1.3.  This all added about 50% increase in difficulty exposure and placement of implants and pre and postoperative management.    ANESTHESIA:  General.    ESTIMATED BLOOD LOSS:  500 mL.    FLUIDS:  2000 mL crystalloid.    COMPONENTS USED:  DePuy hip system, acetabulum size 54 sector cup, 36 liner, stem size 8 standard offset Actis stem, head is a 36 mm +1.5 ceramic head.    INDICATIONS:  This is a 78-year-old gentleman with progressive arthritis of the right hip, failing conservative care.  Because of this, elected for the above.    DESCRIPTION OF PROCEDURE:  Informed consent was given.  2 g of Ancef, 1 g of tranexamic acid.  Underwent general anesthesia, was placed on the [place] table.  A combined 20-degree internal rotation view was taken of the right hip.  Right hip was  all well within our templating goals.  We used a total of 3 L of irrigation.  Mixed 20 mL of Exparel, 30 mL saline, 50 mL of 0.25% course Marcaine.  Injected the TFL, rectus, and subcutaneous tissues with this solution.  TFL fascia was closed with 0 Vicryl suture, 2-0 Vicryl suture for subcutaneous tissues, and 3-0 Vicryl for subcuticular stitch.  LiquiBand Dermabond soft dressing was applied.  The patient was taken to recovery room in stable condition.    POSTOPERATIVE PLANS:    1. The patient will be on a typical postop protocol.  2. He will be on 6 doses of Ancef.  3. We will continue his eloquis as he was on eloquis prior to surgery.  Please note, his baseline creatinine was 1.3 prior to surgery and we will keep him hydrated as well.          SAMI SONI MD      D:  05/06/2024 12:50:15     T:  05/06/2024 19:13:40     MIKE/JOURDAN  Job #:  [id number]     Doc#:  [id number]

## 2024-05-06 NOTE — ANESTHESIA POSTPROCEDURE EVALUATION
Department of Anesthesiology  Postprocedure Note    Patient: Aren Mack  MRN: [id number]  YOB: 1945  Date of evaluation: 5/6/2024    Procedure Summary       Date: 05/06/24 Room / Location: [address]    Anesthesia Start: 1003 Anesthesia Stop: 1214    Procedure: RIGHT TOTAL HIP ARTHROPLASTY ANTERIOR APPROACH (Right: Hip) Diagnosis:       Primary osteoarthritis of right hip      (Primary osteoarthritis of right hip [M16.11])    Surgeons: Enrike Oneil MD Responsible Provider: Mack Mckeon APRN - CRNA    Anesthesia Type: general ASA Status: 3            Anesthesia Type: No value filed.    Lindsay Phase I: Lindsay Score: 10    Lindsay Phase II:      Anesthesia Post Evaluation    Patient location during evaluation: PACU  Patient participation: complete - patient participated  Level of consciousness: lethargic and awake  Pain score: 0  Airway patency: patent  Nausea & Vomiting: no nausea and no vomiting  Cardiovascular status: hemodynamically stable  Respiratory status: spontaneous ventilation, nonlabored ventilation and face mask  Hydration status: stable  Multimodal analgesia pain management approach    No notable events documented.

## 2024-05-06 NOTE — ANESTHESIA PRE PROCEDURE
dexAMETHasone (PF) (DECADRON) injection 10 mg  10 mg IntraVENous Once Enrike Oneil MD           Allergies:    Allergies   Allergen Reactions   • Ambien [Zolpidem] Other (See Comments)     MAKES ME GO CRAZY-UNAWARE OF WHAT HE IS DOING   • Clarithromycin Rash   • Esomeprazole Rash       Problem List:    Patient Active Problem List   Diagnosis Code   • Hyperlipidemia E78.5   • Hypertension I10   • PAF (paroxysmal atrial fibrillation) (HCC) I48.0   • Family history of early CAD Z82.49   • Hypertensive cardiovascular disease I11.9       Past Medical History:        Diagnosis Date   • Arthritis    • Atrial fibrillation (HCC)    • Family history of early CAD 01/11/2017   • Hyperlipidemia    • Hypertension    • Hypertensive cardiovascular disease 01/11/2017    3/31/2012  Echo  CL[facility], diastolic dysfunction, RVSP 41 mmHg 3/31/2012  SE negative for myocardial ischemia    • Sleep apnea     CPAP       Past Surgical History:        Procedure Laterality Date   • APPENDECTOMY  2023   • COLONOSCOPY  2015   • ENDOSCOPY, COLON, DIAGNOSTIC  2007   • ESOPHAGUS SURGERY     • TOTAL KNEE ARTHROPLASTY Right 2008       Social History:    Social History     Tobacco Use   • Smoking status: Never   • Smokeless tobacco: Never   Substance Use Topics   • Alcohol use: Yes     Comment: socially                                Counseling given: Not Answered      Vital Signs (Current):   Vitals:    05/06/24 0749   BP: (!) 146/77   Pulse: 60   Resp: 16   Temp: 97.5 °F (36.4 °C)   TempSrc: Temporal   SpO2: 98%   Weight: 104.3 kg (230 lb)   Height: 1.803 m (5' 11\")                                              BP Readings from Last 3 Encounters:   05/06/24 (!) 146/77   01/11/17 (!) 100/58       NPO Status: Time of last liquid consumption: 1800                        Time of last solid consumption: 1800                        Date of last liquid consumption: 05/05/24                        Date of last solid food consumption: 05/05/24    BMI:   Wt

## 2024-05-06 NOTE — DISCHARGE INSTRUCTIONS
Dr Oneil Total Hip Replacement  Home Instructions     * Elevate and ice affected extremity as needed for swelling and pain.     Use a barrier ( cloth) between ice pack and skin to prevent frostbite.     *Surgical Site Care:         Cleanse crease once daily with soap and water.         The adhesive dressing (glue strip) can be removed in 2 weeks.          May shower and clean wound on Wednesday but do not scrub incision. Pat dry.          NO tub bathing or swimming.         Wash hands frequently during the day.           *Activity:         SAFETY FIRST walk with a walker         Home Health therapy will come to the house two times a week to make sure you can do things around the house:          Get in and out of your bed          Getting up and down out of the chair          Bathroom  / bathing activities          Do NOT walk for exercise ( it will increase pain and swelling )          Walk several times a day but only for short distances                                                                                                        FEVER of 101.5 or less  *Pain Medicines:                                                                         Take Tylenol x 2          Take prescribed medicine as needed for pain                      Deep breath x 10          Take Tylenol as your pain decreases                                   Cough, Cough, Cough          Take a stool softener of your choice while on pain meds     Recheck in 1-11/2 hours     *To prevent blood clots:          Take prescribed blood thinner as directed.  (Resume Eliquis)          Do ankle pump exercises when sitting in the chair             *To Prevent Pneumonia:           Sit up and take deep breaths several times a day and use the incentive spirometer     *Call the office if:           Increased redness, drainage or an opening at your incision, severe pain, new onset fever or chills.            Also notify of any calf pain, tenderness,

## 2024-05-06 NOTE — H&P
[facility] Pre-Operative History and Physical    Patient Name: Lulu  : 1945    BP (!) 146/77   Pulse 60   Temp 97.5 °F (36.4 °C) (Temporal)   Resp 16   Ht 1.803 m (5' 11\")   Wt 104.3 kg (230 lb)   SpO2 98%   BMI 32.08 kg/m²     Pre-Operative Diagnosis:   djd hip    Proposed Surgical Procedure:   Hip replacement      Past Medical Hisotry:       Diagnosis Date    Arthritis     Atrial fibrillation (HCC)     Family history of early CAD 2017    Hyperlipidemia     Hypertension     Hypertensive cardiovascular disease 2017    3/31/2012  Echo  CLVH, diastolic dysfunction, RVSP 41 mmHg 3/31/2012  SE negative for myocardial ischemia     Sleep apnea     CPAP     Past Surgical History:       Procedure Laterality Date    APPENDECTOMY      COLONOSCOPY      ENDOSCOPY, COLON, DIAGNOSTIC      ESOPHAGUS SURGERY      TOTAL KNEE ARTHROPLASTY Right 2008       Medications:   Prior to Admission medications    Medication Sig Start Date End Date Taking? Authorizing Provider   benazepril (LOTENSIN) 10 MG tablet Take 1 tablet by mouth 2 times daily    Shoaib Garcia MD   ezetimibe (ZETIA) 10 MG tablet Take 1 tablet by mouth daily    Shoaib Garcia MD   citalopram (CELEXA) 10 MG tablet Take 0.5 tablets by mouth daily    Shoaib Garcia MD   amiodarone (CORDARONE) 200 MG tablet Take 1 tablet by mouth daily    Shoaib Garcia MD   traZODone (DESYREL) 50 MG tablet Take 1 tablet by mouth nightly    Shoaib Garcia MD   metoprolol succinate (TOPROL XL) 25 MG extended release tablet Take 1 tablet by mouth daily    Shoaib Garcia MD   rosuvastatin (CRESTOR) 10 MG tablet Take 1 tablet by mouth daily    Shoaib Garcia MD   apixaban (ELIQUIS) 5 MG TABS tablet Take 1 tablet by mouth 2 times daily    Shoaib Garcia MD   furosemide (LASIX) 40 MG tablet Take 1 tablet by mouth daily    Shoaib Garcia MD       Allergies:  Ambien [zolpidem],  masses.    EXTREMITIES: positive exam findings: lateral joint line tenderness, tender over tibial tubercle, ecchymosis noted entire limb and ROM limited to approximately 80 degrees    MUSCULOSKELETAL: Tenderness over right hip(s)    NEUROLOGICAL: speech normal    Diagnostic Studies:      Labs: CBC with Differential:    Lab Results   Component Value Date/Time    WBC 6.1 04/17/2024 10:40 AM    RBC 4.93 04/17/2024 10:40 AM    HGB 14.7 04/17/2024 10:40 AM    HCT 45.2 04/17/2024 10:40 AM    HCT 44.3 03/30/2012 09:35 AM     04/17/2024 10:40 AM     03/30/2012 09:35 AM    MCV 91.7 04/17/2024 10:40 AM    MCH 29.8 04/17/2024 10:40 AM    MCHC 32.5 04/17/2024 10:40 AM    RDW 14.8 04/17/2024 10:40 AM    LYMPHOPCT 28.3 01/11/2017 04:08 AM    MONOPCT 12.1 01/11/2017 04:08 AM    EOSPCT 3.2 01/11/2017 04:08 AM    EOSPCT 2.6 03/30/2012 09:35 AM    BASOPCT 0.2 01/11/2017 04:08 AM    MONOSABS 0.70 01/11/2017 04:08 AM    EOSABS 0.20 01/11/2017 04:08 AM    BASOSABS 0.00 01/11/2017 04:08 AM     BMP:    Lab Results   Component Value Date/Time     04/17/2024 10:40 AM     03/30/2012 09:35 AM    K 5.3 04/17/2024 10:40 AM    K 4.7 03/30/2012 09:35 AM     04/17/2024 10:40 AM     03/30/2012 09:35 AM    CO2 25 04/17/2024 10:40 AM    BUN 20 04/17/2024 10:40 AM    CREATININE 1.3 04/17/2024 10:40 AM    CREATININE 0.9 03/30/2012 09:35 AM    CALCIUM 9.4 04/17/2024 10:40 AM    LABGLOM 56 04/17/2024 10:40 AM    GLUCOSE 103 04/17/2024 10:40 AM     Sodium:    Lab Results   Component Value Date/Time     04/17/2024 10:40 AM     03/30/2012 09:35 AM     Potassium:    Lab Results   Component Value Date/Time    K 5.3 04/17/2024 10:40 AM    K 4.7 03/30/2012 09:35 AM     BUN/Creatinine:    Lab Results   Component Value Date/Time    BUN 20 04/17/2024 10:40 AM    CREATININE 1.3 04/17/2024 10:40 AM    CREATININE 0.9 03/30/2012 09:35 AM     PT/INR:    Lab Results   Component Value Date/Time    PROTIME 14.1 04/17/2024

## 2024-05-06 NOTE — BRIEF OP NOTE
Brief Postoperative Note      Patient: Aren Mack  YOB: 1945  MRN: [id number]    Date of Procedure: 5/6/2024    Pre-Op Diagnosis Codes:     * Primary osteoarthritis of right hip [M16.11]    Post-Op Diagnosis: Same       Procedure(s):  RIGHT TOTAL HIP ARTHROPLASTY ANTERIOR APPROACH    Surgeon(s):  Enrike Oneil MD    Assistant:  First Assistant: Alton Dolan PA-C; Alex Iqbal    Anesthesia: General    Estimated Blood Loss (mL): 500    Complications: None    Specimens:   * No specimens in log *    Implants:  Implant Name Type Inv. Item Serial No.  Lot No. LRB No. Used Action   LINER ACET OD54MM ID36MM HIP ALTRX PINN - [id number]  LINER ACET OD54MM ID36MM HIP ALTRX PINN  [facility] [id number] Right 1 Implanted   STEM FEM SZ 8 L111MM 12/14 TAPR STD OFFSET HIP DUOFIX CLLRD - [id number]  STEM FEM SZ 8 L111MM 12/14 TAPR STD OFFSET HIP DUOFIX CLLRD  [facility] [id number] Right 1 Implanted   HEAD FEM [id number] +1.5MM OFFSET 12/14 TAPR HIP CERAMIC - [id number]  HEAD FEM [id number] +1.5MM OFFSET 12/14 TAPR HIP CERAMIC  [facility] [id number] Right 1 Implanted   CUP ACET [id number] HIP GRIPTION NILTON CEMENTLESS FIX SECT SER - [id number]  CUP ACET [id number] HIP GRIPTION NILTON CEMENTLESS FIX SECT SER  [facility] [id number] Right 1 Implanted         Drains: * No LDAs found *    Findings:  Infection Present At Time Of Surgery (PATOS) (choose all levels that have infection present):  No infection present  Other Findings:     Electronically signed by Enrike Oneil MD on 5/6/2024 at 11:44 AM   Faxed COVID-19 and FLU A&B results to the health department at [phone].     Received fax confirmation. Thank you.

## 2024-05-07 VITALS
RESPIRATION RATE: 12 BRPM | TEMPERATURE: 97.9 F | HEIGHT: 71 IN | SYSTOLIC BLOOD PRESSURE: 100 MMHG | HEART RATE: 66 BPM | WEIGHT: 230 LBS | DIASTOLIC BLOOD PRESSURE: 55 MMHG | BODY MASS INDEX: 32.2 KG/M2 | OXYGEN SATURATION: 98 %

## 2024-05-07 PROBLEM — M16.11 PRIMARY OSTEOARTHRITIS OF RIGHT HIP: Status: ACTIVE | Noted: 2024-05-06

## 2024-05-07 LAB
ANION GAP SERPL CALCULATED.3IONS-SCNC: 10 MMOL/L (ref 7–19)
BUN SERPL-MCNC: 16 MG/DL (ref 8–23)
CALCIUM SERPL-MCNC: 7.7 MG/DL (ref 8.8–10.2)
CHLORIDE SERPL-SCNC: 104 MMOL/L (ref 98–111)
CO2 SERPL-SCNC: 25 MMOL/L (ref 22–29)
CREAT SERPL-MCNC: 1.1 MG/DL (ref 0.5–1.2)
GLUCOSE BLD-MCNC: 138 MG/DL (ref 70–99)
GLUCOSE BLD-MCNC: 143 MG/DL (ref 70–99)
GLUCOSE SERPL-MCNC: 161 MG/DL (ref 74–109)
HBA1C MFR BLD: 5.8 % (ref 4–6)
HCT VFR BLD AUTO: 32.6 % (ref 42–52)
HGB BLD-MCNC: 10.4 G/DL (ref 14–18)
PERFORMED ON: ABNORMAL
PERFORMED ON: ABNORMAL
POTASSIUM SERPL-SCNC: 4.3 MMOL/L (ref 3.5–5)
SODIUM SERPL-SCNC: 139 MMOL/L (ref 136–145)

## 2024-05-07 PROCEDURE — 36415 COLL VENOUS BLD VENIPUNCTURE: CPT

## 2024-05-07 PROCEDURE — 97161 PT EVAL LOW COMPLEX 20 MIN: CPT

## 2024-05-07 PROCEDURE — 82962 GLUCOSE BLOOD TEST: CPT

## 2024-05-07 PROCEDURE — 83036 HEMOGLOBIN GLYCOSYLATED A1C: CPT

## 2024-05-07 PROCEDURE — 97116 GAIT TRAINING THERAPY: CPT

## 2024-05-07 PROCEDURE — 6370000000 HC RX 637 (ALT 250 FOR IP): Performed by: STUDENT IN AN ORGANIZED HEALTH CARE EDUCATION/TRAINING PROGRAM

## 2024-05-07 PROCEDURE — 85018 HEMOGLOBIN: CPT

## 2024-05-07 PROCEDURE — 94760 N-INVAS EAR/PLS OXIMETRY 1: CPT

## 2024-05-07 PROCEDURE — 2580000003 HC RX 258: Performed by: ORTHOPAEDIC SURGERY

## 2024-05-07 PROCEDURE — 6370000000 HC RX 637 (ALT 250 FOR IP): Performed by: ORTHOPAEDIC SURGERY

## 2024-05-07 PROCEDURE — 80048 BASIC METABOLIC PNL TOTAL CA: CPT

## 2024-05-07 PROCEDURE — G0378 HOSPITAL OBSERVATION PER HR: HCPCS

## 2024-05-07 PROCEDURE — 6360000002 HC RX W HCPCS: Performed by: ORTHOPAEDIC SURGERY

## 2024-05-07 PROCEDURE — 96361 HYDRATE IV INFUSION ADD-ON: CPT

## 2024-05-07 PROCEDURE — 96360 HYDRATION IV INFUSION INIT: CPT

## 2024-05-07 PROCEDURE — 85014 HEMATOCRIT: CPT

## 2024-05-07 RX ORDER — INSULIN LISPRO 100 [IU]/ML
0-4 INJECTION, SOLUTION INTRAVENOUS; SUBCUTANEOUS
Status: DISCONTINUED | OUTPATIENT
Start: 2024-05-07 | End: 2024-05-07 | Stop reason: HOSPADM

## 2024-05-07 RX ORDER — DEXTROSE MONOHYDRATE 100 MG/ML
INJECTION, SOLUTION INTRAVENOUS CONTINUOUS PRN
Status: DISCONTINUED | OUTPATIENT
Start: 2024-05-07 | End: 2024-05-07 | Stop reason: HOSPADM

## 2024-05-07 RX ORDER — INSULIN LISPRO 100 [IU]/ML
0-4 INJECTION, SOLUTION INTRAVENOUS; SUBCUTANEOUS NIGHTLY
Status: DISCONTINUED | OUTPATIENT
Start: 2024-05-07 | End: 2024-05-07 | Stop reason: HOSPADM

## 2024-05-07 RX ORDER — OXYCODONE HYDROCHLORIDE 5 MG/1
5 TABLET ORAL SEE ADMIN INSTRUCTIONS
Qty: 90 TABLET | Refills: 0 | Status: SHIPPED | OUTPATIENT
Start: 2024-05-07 | End: 2024-06-13

## 2024-05-07 RX ORDER — ONDANSETRON 4 MG/1
4 TABLET, FILM COATED ORAL EVERY 8 HOURS PRN
Qty: 30 TABLET | Refills: 0 | Status: SHIPPED | OUTPATIENT
Start: 2024-05-07

## 2024-05-07 RX ADMIN — FUROSEMIDE 40 MG: 40 TABLET ORAL at 07:50

## 2024-05-07 RX ADMIN — EZETIMIBE 10 MG: 10 TABLET ORAL at 07:49

## 2024-05-07 RX ADMIN — SODIUM CHLORIDE 500 ML: 9 INJECTION, SOLUTION INTRAVENOUS at 02:47

## 2024-05-07 RX ADMIN — OXYCODONE 5 MG: 5 TABLET ORAL at 02:32

## 2024-05-07 RX ADMIN — ACETAMINOPHEN 650 MG: 325 TABLET ORAL at 07:50

## 2024-05-07 RX ADMIN — WATER 2000 MG: 1 INJECTION INTRAMUSCULAR; INTRAVENOUS; SUBCUTANEOUS at 01:51

## 2024-05-07 RX ADMIN — TRAMADOL HYDROCHLORIDE 50 MG: 50 TABLET ORAL at 01:52

## 2024-05-07 RX ADMIN — ACETAMINOPHEN 650 MG: 325 TABLET ORAL at 01:51

## 2024-05-07 RX ADMIN — POLYETHYLENE GLYCOL 3350 17 G: 17 POWDER, FOR SOLUTION ORAL at 07:50

## 2024-05-07 RX ADMIN — OXYCODONE HYDROCHLORIDE 10 MG: 10 TABLET ORAL at 06:27

## 2024-05-07 RX ADMIN — TRAMADOL HYDROCHLORIDE 50 MG: 50 TABLET ORAL at 07:50

## 2024-05-07 RX ADMIN — APIXABAN 5 MG: 5 TABLET, FILM COATED ORAL at 07:49

## 2024-05-07 RX ADMIN — ROSUVASTATIN 10 MG: 10 TABLET, FILM COATED ORAL at 07:50

## 2024-05-07 RX ADMIN — SENNOSIDES, DOCUSATE SODIUM 1 TABLET: 8.6; 5 TABLET ORAL at 07:49

## 2024-05-07 RX ADMIN — CITALOPRAM 5 MG: 20 TABLET, FILM COATED ORAL at 07:50

## 2024-05-07 RX ADMIN — AMIODARONE HYDROCHLORIDE 200 MG: 200 TABLET ORAL at 07:50

## 2024-05-07 RX ADMIN — TAMSULOSIN HYDROCHLORIDE 0.4 MG: 0.4 CAPSULE ORAL at 07:49

## 2024-05-07 RX ADMIN — WATER 2000 MG: 1 INJECTION INTRAMUSCULAR; INTRAVENOUS; SUBCUTANEOUS at 10:14

## 2024-05-07 RX ADMIN — METOPROLOL SUCCINATE 25 MG: 25 TABLET, FILM COATED, EXTENDED RELEASE ORAL at 07:50

## 2024-05-07 ASSESSMENT — PAIN DESCRIPTION - ORIENTATION
ORIENTATION: RIGHT
ORIENTATION: RIGHT

## 2024-05-07 ASSESSMENT — PAIN DESCRIPTION - FREQUENCY: FREQUENCY: INTERMITTENT

## 2024-05-07 ASSESSMENT — PAIN DESCRIPTION - DESCRIPTORS
DESCRIPTORS: ACHING
DESCRIPTORS: ACHING

## 2024-05-07 ASSESSMENT — PAIN - FUNCTIONAL ASSESSMENT
PAIN_FUNCTIONAL_ASSESSMENT: ACTIVITIES ARE NOT PREVENTED
PAIN_FUNCTIONAL_ASSESSMENT: ACTIVITIES ARE NOT PREVENTED

## 2024-05-07 ASSESSMENT — PAIN SCALES - GENERAL
PAINLEVEL_OUTOF10: 1
PAINLEVEL_OUTOF10: 4
PAINLEVEL_OUTOF10: 5
PAINLEVEL_OUTOF10: 3

## 2024-05-07 ASSESSMENT — PAIN DESCRIPTION - PAIN TYPE: TYPE: SURGICAL PAIN

## 2024-05-07 ASSESSMENT — PAIN DESCRIPTION - DIRECTION: RADIATING_TOWARDS: NO

## 2024-05-07 ASSESSMENT — PAIN DESCRIPTION - LOCATION
LOCATION: HIP
LOCATION: HIP

## 2024-05-07 ASSESSMENT — PAIN DESCRIPTION - ONSET: ONSET: GRADUAL

## 2024-05-07 NOTE — CARE COORDINATION
TA Letter given to patient. Reviewed, discussed, answered all questions, and signed by patient. Signed copy placed in patient's chart.     05/07/24 1116   IMM Letter   Observation Status Letter date given: 05/07/24   Observation Status Letter time given: 1110   Observation Status Letter given to Patient/Family/Significant other/Guardian/POA/by: given to patient at bedside by NANNETTE MCNEILL RN BSN CM     Electronically signed by Nannette Mcneill RN, BSN on 5/7/2024 at 11:16 AM

## 2024-05-07 NOTE — CONSULTS
Referring Provider: Dr. Oneil  Reason for Consultation: Medical comanagement    Patient Care Team:  Gem Duran MD as PCP - General    Chief complaint hip pain, total hip arthroplasty    Subjective .     History of present illness:      78-year-old male with past medical history of atrial fibrillation anticoagulated on Eliquis, hypertension, and hyperlipidemia who presents postoperatively from total hip arthroplasty.  He has a long history of hip pain resistant to conservative measures and after careful consultation as an outpatient, he elected to proceed with total hip arthroplasty with Dr. Oneil.  He underwent the operation on 5/6 and tolerated the procedure quite well.  He has no postoperative complaints and is in the expected amount of postoperative pain.      REVIEW OF SYSTEMS:    CONSTITUTIONAL:  Negative for anorexia, chills, fevers, night sweats and weight loss  EYES:  negative for eye dryness, icterus and redness  HEENT:   negative for dental problems, epistaxis, facial trauma and thrush  RESPIRATORY:  negative for chest tightness, cough, dyspnea on exertion, pneumonia and sputum  CARDIOVASCULAR: negative for chest pain, dyspnea, exertional chest pressure/discomfort, irregular heart beat, palpitations, paroxysmal nocturnal dyspnea and syncope  GASTROINTESTINAL:  negative for abdominal pain, hematemesis, jaundice, melena and rectal bleeding  MUSCULOSKELETAL:  negative for muscle weakness, myalgias and neck pain, chronic joint pain noted  NEUROLOGICAL:   negative for dizziness, headaches, seizures, speech problems, tremors and vertigo  INTEGUMENT: negative for pruritus, rash, skin color change and skin lesion(s)   A Full 14 point review of systems is negative outside those listed above and in the HPI      History    Past Medical History:   Diagnosis Date    Arthritis     Atrial fibrillation (HCC)     Family history of early CAD 01/11/2017    Hyperlipidemia     Hypertension     Hypertensive cardiovascular

## 2024-05-07 NOTE — PROGRESS NOTES
Subjective:     Post-Operative Day: 1 Status Post right kadi  Systemic or Specific Complaints:No Complaints  no nausea Pain 6    Objective:     Patient Vitals for the past 24 hrs:   BP Temp Temp src Pulse Resp SpO2 Height Weight   05/07/24 0627 -- -- -- -- 16 -- -- --   05/07/24 0615 116/64 -- -- 70 -- -- -- --   05/07/24 0439 92/62 98.1 °F (36.7 °C) -- 68 14 98 % -- --   05/07/24 0302 -- -- -- -- 14 -- -- --   05/07/24 0232 -- -- -- -- 16 -- -- --   05/07/24 0024 (!) 88/50 97.9 °F (36.6 °C) Temporal 71 18 97 % -- --   05/06/24 2054 -- -- -- -- 16 -- -- --   05/06/24 2033 111/64 97.5 °F (36.4 °C) -- 63 16 97 % -- --   05/06/24 1800 (!) 92/58 -- -- -- -- -- -- --   05/06/24 1754 (!) 84/54 97.2 °F (36.2 °C) Temporal 59 16 98 % -- --   05/06/24 1605 (!) 96/59 97.2 °F (36.2 °C) Temporal 56 12 96 % -- --   05/06/24 1458 92/63 97.5 °F (36.4 °C) Temporal 57 14 97 % -- --   05/06/24 1452 -- -- -- -- -- 96 % -- --   05/06/24 1301 (!) 91/57 97.3 °F (36.3 °C) Temporal 60 12 97 % -- --   05/06/24 1241 -- 97.5 °F (36.4 °C) Temporal 62 13 96 % -- --   05/06/24 1235 -- -- -- 67 -- -- -- --   05/06/24 1230 111/69 -- -- 59 10 97 % -- --   05/06/24 1225 (!) 86/52 -- -- 64 11 96 % -- --   05/06/24 1220 94/70 -- -- 71 13 98 % -- --   05/06/24 1215 (!) 79/51 97.9 °F (36.6 °C) Oral 77 18 100 % -- --   05/06/24 1211 -- 97.9 °F (36.6 °C) -- -- -- -- -- --   05/06/24 0749 (!) 146/77 97.5 °F (36.4 °C) Temporal 60 16 98 % 1.803 m (5' 11\") 104.3 kg (230 lb)       General: alert, appears stated age, and cooperative   Exam: Incision clean, dry, and intact, no evidence of infection.    Neurovascular: Exam normal       Data Review:  Recent Labs     05/07/24  0230   HGB 10.4*     Recent Labs     05/07/24  0230      K 4.3   CREATININE 1.1     Recent Labs     05/07/24  0230   LABGLOM 68           Assessment:     Status Post right kadi.      Plan:     Continues current post-op course      Electronically signed by Enrike Oneil MD on 5/7/2024 at  7:06 AM

## 2024-05-07 NOTE — PROGRESS NOTES
Patient discharged home today with Home Care.  Went over all discharge instructions and new medications with patient and provided a copy of all new medications to take home.  Patient verbalized understanding.  Patient was stable upon discharge.  Electronically signed by Mckenzie Junior RN on 5/7/2024 at 9:20 AM

## 2024-05-07 NOTE — CARE COORDINATION
[facility] unable to accept due to insurance.  [facility].  Per Rachel, they can accept  P. [phone]  F. [phone]  Electronically signed by Elizabeth Palmer on 5/7/24 at 10:39 AM CDT    Spoke with patient regarding MD orders for HH services.  Pt agreeable and chose [facility]. Referral faxed and PENDING.Please notify HH when patient discharges and Fax DC Summary,  DC med list and any new HH orders.     P. [phone]  F. [phone]  The Patient and/or patient representative was provided with a choice of provider and agrees   with the discharge plan. [x] Yes [] No    Freedom of choice list was provided with basic dialogue that supports the patient's individualized plan of care/goals, treatment preferences and shares the quality data associated with the providers. [x] Yes [] No  Electronically signed by Elizabeth Palmer on 5/7/24 at 10:02 AM CDT

## 2024-05-07 NOTE — PROGRESS NOTES
Physical Therapy  Facility/Department: [facility] SURG SERVICES  Physical Therapy Initial Assessment    Name: Aren Mack  : 1945  MRN: [id number]  Date of Service: 2024    Discharge Recommendations:  Continue to assess pending progress, 24 hour supervision or assist          Patient Diagnosis(es): The encounter diagnosis was Primary osteoarthritis of right hip.  Past Medical History:  has a past medical history of Arthritis, Atrial fibrillation (HCC), Family history of early CAD, Hyperlipidemia, Hypertension, Hypertensive cardiovascular disease, and Sleep apnea.  Past Surgical History:  has a past surgical history that includes Colonoscopy (); Endoscopy, colon, diagnostic (); Total knee arthroplasty (Right, ); Esophagus surgery; Appendectomy (); and Total hip arthroplasty (Right, 2024).    Assessment   Body Structures, Functions, Activity Limitations Requiring Skilled Therapeutic Intervention: Decreased functional mobility ;Decreased ADL status;Decreased ROM;Decreased strength;Decreased balance;Increased pain  Assessment: Pt ABLE TO STAND AND TAKE SHORT WALK IN ROOM WITH WALKER. WILL PROGRESS WITH HOUSEHOLD DISTANCES.  Requires PT Follow-Up: Yes  Activity Tolerance  Activity Tolerance: Patient tolerated treatment well     Plan   Physical Therapy Plan  General Plan: 5-7 times per week  Current Treatment Recommendations: Balance training, Functional mobility training, Transfer training, Gait training, Stair training, Safety education & training, Patient/Caregiver education & training  Safety Devices  Type of Devices: Call light within reach     Restrictions  Restrictions/Precautions  Restrictions/Precautions: Fall Risk, Weight Bearing  Lower Extremity Weight Bearing Restrictions  Right Lower Extremity Weight Bearing: Weight Bearing As Tolerated  Position Activity Restriction  Hip Precautions: Anterior hip precautions     Subjective   Pain: REPORTS 3/10 WITH AMBULATION, ENCOURAGED TO NOTIFY  NURSE FOR MEDS AS NEEDED  General  Diagnosis: R THR  Subjective  Subjective: Pt READY TO GET OOB         Social/Functional History  Social/Functional History  Lives With: Alone (WILL BE STAYING WITH FIANCE)  Type of Home: House  Home Layout: One level  Home Access: Stairs to enter with rails  Home Equipment: Walker, rolling  Has the patient had two or more falls in the past year or any fall with injury in the past year?: No  ADL Assistance: Independent  Homemaking Assistance: Independent  Homemaking Responsibilities: Yes  Ambulation Assistance: Independent  Transfer Assistance: Independent  Active : Yes  Vision/Hearing  Vision  Vision: Within Functional Limits  Hearing  Hearing: Within functional limits    Cognition   Orientation  Overall Orientation Status: Within Normal Limits  Cognition  Overall Cognitive Status: WNL     Objective   Temp: 98.2 °F (36.8 °C)  Pulse: 71  Heart Rate Source: Monitor  Respirations: 16  SpO2: 99 %  O2 Device: None (Room air)  BP: 100/69  MAP (Calculated): 79  BP Location: Right upper arm        Gross Assessment  AROM:  (R HIP DEC OVERALL DUE TO DISCOMFORT)  Strength:  (R HIP < 3/5)                    Bed mobility  Supine to Sit: Contact guard assistance;Minimal assistance  Transfers  Sit to Stand: Contact guard assistance;Minimal Assistance  Stand to Sit: Contact guard assistance;Minimal Assistance  Bed to Chair: Contact guard assistance;Minimal assistance  Ambulation  WB Status: FWB  Ambulation  Device: Rolling Walker  Assistance: Contact guard assistance;Minimal assistance  Quality of Gait: STEP-TO PATTERN, SUFFICIENT WEIGHTBEARING  Gait Deviations: Slow Anuradha;Decreased step length  Distance: 12'     Balance  Sitting - Dynamic: Good  Standing - Dynamic: Fair;+           OutComes Score                                                  AM-PAC - Mobility              Tinneti Score       Goals  Short Term Goals  Time Frame for Short Term Goals: 14 DAYS  Short Term Goal 1: BED MOB

## 2024-05-07 NOTE — PROGRESS NOTES
This nurse insured pt had discharge paperwork at beside and was ready for discharge. This nurse took IV out, no complications. Pt and family at bedside awaiting transport with wheelchair for discharge.

## 2024-05-08 ENCOUNTER — TELEPHONE (OUTPATIENT)
Dept: INPATIENT UNIT | Age: 79
End: 2024-05-08

## 2024-05-08 NOTE — DISCHARGE SUMMARY
[facility]  Discharge Summary    The Aren Mack is a 78 y.o. male underwent R EUGENE procedure without complication.  Aren Mack was admitted to the floor following his   recovery in the PACU.     Discharge Diagnosis  right hip djd    Current Inpatient Medications    No current facility-administered medications for this encounter.    Post-operatively the patient’s diet was advanced as tolerated and their incision was checked on POD #1.  The incision is dressing in place, clean, dry, and intact with no signs of infection.  The patient remained neurovascularly intact in the lower extremity and had intact pulses distally.  Patient’s calf remained soft and showed no evidence of DVT.  The patient was able to move their hip without any problems post-operatively.  Physical therapy and occupational therapy were consulted and began working with the patient post-operatively.  The patient progressed with PT/OT as would be expected and continued to improve through their stay.  The patients pain was initially controlled with IV medications but we were able to transition to oral pain medications soon after arrival to the floor and their pain remained under good control through their hospital stay.  From a medical standpoint the patient remained stable and continued to have the medicine team follow throughout their stay.  The patients dressing was changed/incison was checked on day of d/c.  The patient will be discharged at this time to Home  with their current diet restrictions and will continue to follow the hip precautions outlined to them by us and PT/OT.     Condition on Discharge: Stable    Plan  Return visit in 6 weeks..  Patient was instructed on the use of pain medications, the signs and symptoms of infection, and was given our number to call should they have any questions or concerns following discharge.

## 2024-10-21 DIAGNOSIS — Z96.641 PRESENCE OF RIGHT ARTIFICIAL HIP JOINT: Primary | ICD-10-CM

## 2024-10-22 ENCOUNTER — OFFICE VISIT (OUTPATIENT)
Age: 79
End: 2024-10-22

## 2024-10-22 VITALS — WEIGHT: 208 LBS | HEIGHT: 71 IN | BODY MASS INDEX: 29.12 KG/M2

## 2024-10-22 DIAGNOSIS — M16.11 PRIMARY OSTEOARTHRITIS OF RIGHT HIP: Primary | ICD-10-CM

## 2024-10-22 NOTE — PROGRESS NOTES
[facility]  [facility]  [address]  [facility] [id number] [phone]     Patient: Aren Mack   YOB: 1945   Date: 10/22/2024     Chief Complaint   Patient presents with    Follow-up     Rt THR on 05/06/2024        History of Present Illness  This is a 79 y.o. male presents today wife he is now 5 and half months out of his right hip replacement very pleased doing very well.    Past Medical History:   Diagnosis Date    Arthritis     Atrial fibrillation (HCC)     Family history of early CAD 01/11/2017    Hyperlipidemia     Hypertension     Hypertensive cardiovascular disease 01/11/2017    3/31/2012  Echo  CLVH, diastolic dysfunction, RVSP 41 mmHg 3/31/2012  SE negative for myocardial ischemia     Sleep apnea     CPAP      Past Surgical History:   Procedure Laterality Date    APPENDECTOMY  2023    COLONOSCOPY  2015    ENDOSCOPY, COLON, DIAGNOSTIC  2007    ESOPHAGUS SURGERY      TOTAL HIP ARTHROPLASTY Right 5/6/2024    RIGHT TOTAL HIP ARTHROPLASTY ANTERIOR APPROACH performed by Enrike Oneil MD at [facility] OR    TOTAL KNEE ARTHROPLASTY Right 2008      Social History     Socioeconomic History    Marital status:      Spouse name: None    Number of children: None    Years of education: None    Highest education level: None   Tobacco Use    Smoking status: Never    Smokeless tobacco: Never   Vaping Use    Vaping status: Never Used   Substance and Sexual Activity    Alcohol use: Yes     Alcohol/week: 2.0 standard drinks of alcohol     Types: 2 Glasses of wine per week     Comment: socially    Drug use: Never     Social Determinants of Health     Food Insecurity: No Food Insecurity (5/6/2024)    Hunger Vital Sign     Worried About Running Out of Food in the Last Year: Never true     Ran Out of Food in the Last Year: Never true   Transportation Needs: No Transportation Needs (5/6/2024)    PRAPARE - Transportation     Lack of

## 2025-04-29 ENCOUNTER — HOSPITAL ENCOUNTER (OUTPATIENT)
Dept: CARDIOLOGY | Facility: HOSPITAL | Age: 80
Discharge: HOME | End: 2025-04-29
Payer: MEDICARE

## 2025-04-29 DIAGNOSIS — I25.10 CORONARY ARTERY DISEASE INVOLVING NATIVE CORONARY ARTERY OF NATIVE HEART, UNSPECIFIED WHETHER ANGINA PRESENT: ICD-10-CM

## 2025-04-29 DIAGNOSIS — I25.10 CORONARY ARTERY DISEASE INVOLVING NATIVE CORONARY ARTERY OF NATIVE HEART, UNSPECIFIED WHETHER ANGINA PRESENT: Primary | ICD-10-CM

## 2025-05-01 ENCOUNTER — PREP FOR SURGERY (OUTPATIENT)
Dept: OTHER | Facility: HOSPITAL | Age: 80
End: 2025-05-01
Payer: MEDICARE

## 2025-05-01 ENCOUNTER — TELEPHONE (OUTPATIENT)
Dept: CARDIAC SURGERY | Facility: CLINIC | Age: 80
End: 2025-05-01
Payer: MEDICARE

## 2025-05-01 ENCOUNTER — OFFICE VISIT (OUTPATIENT)
Dept: CARDIAC SURGERY | Facility: CLINIC | Age: 80
End: 2025-05-01
Payer: MEDICARE

## 2025-05-01 ENCOUNTER — LAB (OUTPATIENT)
Dept: LAB | Facility: HOSPITAL | Age: 80
End: 2025-05-01
Payer: MEDICARE

## 2025-05-01 VITALS
HEIGHT: 71 IN | BODY MASS INDEX: 33.88 KG/M2 | DIASTOLIC BLOOD PRESSURE: 84 MMHG | OXYGEN SATURATION: 95 % | HEART RATE: 59 BPM | WEIGHT: 242 LBS | SYSTOLIC BLOOD PRESSURE: 130 MMHG

## 2025-05-01 DIAGNOSIS — I25.10 CORONARY ARTERY DISEASE INVOLVING NATIVE CORONARY ARTERY OF NATIVE HEART WITHOUT ANGINA PECTORIS: Primary | ICD-10-CM

## 2025-05-01 DIAGNOSIS — R79.89 OTHER SPECIFIED ABNORMAL FINDINGS OF BLOOD CHEMISTRY: ICD-10-CM

## 2025-05-01 DIAGNOSIS — I79.8 OTHER DISORDERS OF ARTERIES, ARTERIOLES AND CAPILLARIES IN DISEASES CLASSIFIED ELSEWHERE: ICD-10-CM

## 2025-05-01 DIAGNOSIS — I35.0 NONRHEUMATIC AORTIC VALVE STENOSIS: Primary | ICD-10-CM

## 2025-05-01 DIAGNOSIS — I25.10 CORONARY ARTERY DISEASE INVOLVING NATIVE CORONARY ARTERY OF NATIVE HEART WITHOUT ANGINA PECTORIS: ICD-10-CM

## 2025-05-01 DIAGNOSIS — R06.02 SOB (SHORTNESS OF BREATH): ICD-10-CM

## 2025-05-01 DIAGNOSIS — I35.0 AORTIC VALVE STENOSIS: ICD-10-CM

## 2025-05-01 LAB
ALBUMIN SERPL-MCNC: 4.5 G/DL (ref 3.5–5.2)
ALBUMIN/GLOB SERPL: 1.7 G/DL
ALP SERPL-CCNC: 57 U/L (ref 39–117)
ALT SERPL W P-5'-P-CCNC: 12 U/L (ref 1–41)
ANION GAP SERPL CALCULATED.3IONS-SCNC: 12 MMOL/L (ref 5–15)
AST SERPL-CCNC: 14 U/L (ref 1–40)
BASOPHILS # BLD AUTO: 0.02 10*3/MM3 (ref 0–0.2)
BASOPHILS NFR BLD AUTO: 0.3 % (ref 0–1.5)
BILIRUB SERPL-MCNC: 0.8 MG/DL (ref 0–1.2)
BUN SERPL-MCNC: 21 MG/DL (ref 8–23)
BUN/CREAT SERPL: 18.3 (ref 7–25)
CALCIUM SPEC-SCNC: 9 MG/DL (ref 8.6–10.5)
CHLORIDE SERPL-SCNC: 100 MMOL/L (ref 98–107)
CO2 SERPL-SCNC: 27 MMOL/L (ref 22–29)
CREAT SERPL-MCNC: 1.15 MG/DL (ref 0.76–1.27)
DEPRECATED RDW RBC AUTO: 49.4 FL (ref 37–54)
EGFRCR SERPLBLD CKD-EPI 2021: 64.3 ML/MIN/1.73
EOSINOPHIL # BLD AUTO: 0.18 10*3/MM3 (ref 0–0.4)
EOSINOPHIL NFR BLD AUTO: 3.1 % (ref 0.3–6.2)
ERYTHROCYTE [DISTWIDTH] IN BLOOD BY AUTOMATED COUNT: 15 % (ref 12.3–15.4)
GLOBULIN UR ELPH-MCNC: 2.7 GM/DL
GLUCOSE SERPL-MCNC: 104 MG/DL (ref 65–99)
HBA1C MFR BLD: 6.5 % (ref 4.8–5.6)
HCT VFR BLD AUTO: 43.6 % (ref 37.5–51)
HGB BLD-MCNC: 14.4 G/DL (ref 13–17.7)
IMM GRANULOCYTES # BLD AUTO: 0.03 10*3/MM3 (ref 0–0.05)
IMM GRANULOCYTES NFR BLD AUTO: 0.5 % (ref 0–0.5)
LYMPHOCYTES # BLD AUTO: 1.6 10*3/MM3 (ref 0.7–3.1)
LYMPHOCYTES NFR BLD AUTO: 27.1 % (ref 19.6–45.3)
MCH RBC QN AUTO: 29.8 PG (ref 26.6–33)
MCHC RBC AUTO-ENTMCNC: 33 G/DL (ref 31.5–35.7)
MCV RBC AUTO: 90.3 FL (ref 79–97)
MONOCYTES # BLD AUTO: 0.44 10*3/MM3 (ref 0.1–0.9)
MONOCYTES NFR BLD AUTO: 7.5 % (ref 5–12)
NEUTROPHILS NFR BLD AUTO: 3.63 10*3/MM3 (ref 1.7–7)
NEUTROPHILS NFR BLD AUTO: 61.5 % (ref 42.7–76)
NRBC BLD AUTO-RTO: 0 /100 WBC (ref 0–0.2)
PLATELET # BLD AUTO: 167 10*3/MM3 (ref 140–450)
PMV BLD AUTO: 11 FL (ref 6–12)
POTASSIUM SERPL-SCNC: 3.8 MMOL/L (ref 3.5–5.2)
PROT SERPL-MCNC: 7.2 G/DL (ref 6–8.5)
RBC # BLD AUTO: 4.83 10*6/MM3 (ref 4.14–5.8)
SODIUM SERPL-SCNC: 139 MMOL/L (ref 136–145)
WBC NRBC COR # BLD AUTO: 5.9 10*3/MM3 (ref 3.4–10.8)

## 2025-05-01 PROCEDURE — 36415 COLL VENOUS BLD VENIPUNCTURE: CPT

## 2025-05-01 PROCEDURE — 85025 COMPLETE CBC W/AUTO DIFF WBC: CPT

## 2025-05-01 PROCEDURE — 83036 HEMOGLOBIN GLYCOSYLATED A1C: CPT

## 2025-05-01 PROCEDURE — 1159F MED LIST DOCD IN RCRD: CPT | Performed by: SURGERY

## 2025-05-01 PROCEDURE — 99204 OFFICE O/P NEW MOD 45 MIN: CPT | Performed by: SURGERY

## 2025-05-01 PROCEDURE — 80053 COMPREHEN METABOLIC PANEL: CPT

## 2025-05-01 PROCEDURE — 1160F RVW MEDS BY RX/DR IN RCRD: CPT | Performed by: SURGERY

## 2025-05-01 RX ORDER — IBUPROFEN 600 MG/1
1 TABLET ORAL
OUTPATIENT
Start: 2025-05-01

## 2025-05-01 RX ORDER — NICOTINE POLACRILEX 4 MG
15 LOZENGE BUCCAL
OUTPATIENT
Start: 2025-05-01

## 2025-05-01 RX ORDER — BENAZEPRIL HYDROCHLORIDE 5 MG/1
5 TABLET ORAL DAILY
COMMUNITY
Start: 2025-04-15

## 2025-05-01 RX ORDER — SODIUM CHLORIDE 9 MG/ML
40 INJECTION, SOLUTION INTRAVENOUS AS NEEDED
OUTPATIENT
Start: 2025-05-01

## 2025-05-01 RX ORDER — CITALOPRAM HYDROBROMIDE 10 MG/1
5 TABLET ORAL DAILY
COMMUNITY
Start: 2025-04-15

## 2025-05-01 RX ORDER — EZETIMIBE 10 MG/1
10 TABLET ORAL DAILY
COMMUNITY
Start: 2025-03-10

## 2025-05-01 RX ORDER — DEXTROSE MONOHYDRATE 25 G/50ML
10-50 INJECTION, SOLUTION INTRAVENOUS
OUTPATIENT
Start: 2025-05-01

## 2025-05-01 RX ORDER — FUROSEMIDE 40 MG/1
40 TABLET ORAL DAILY
COMMUNITY
Start: 2025-01-15

## 2025-05-01 RX ORDER — SODIUM CHLORIDE 0.9 % (FLUSH) 0.9 %
10 SYRINGE (ML) INJECTION AS NEEDED
OUTPATIENT
Start: 2025-05-01

## 2025-05-01 RX ORDER — ACETAMINOPHEN 500 MG
1000 TABLET ORAL ONCE
OUTPATIENT
Start: 2025-06-16

## 2025-05-01 RX ORDER — TRAZODONE HYDROCHLORIDE 50 MG/1
50 TABLET ORAL NIGHTLY
COMMUNITY
Start: 2025-04-15

## 2025-05-01 RX ORDER — SODIUM CHLORIDE 0.9 % (FLUSH) 0.9 %
10 SYRINGE (ML) INJECTION EVERY 12 HOURS SCHEDULED
OUTPATIENT
Start: 2025-05-01

## 2025-05-01 RX ORDER — SODIUM CHLORIDE 0.9 % (FLUSH) 0.9 %
30 SYRINGE (ML) INJECTION ONCE AS NEEDED
OUTPATIENT
Start: 2025-05-01

## 2025-05-01 RX ORDER — SODIUM CHLORIDE 9 MG/ML
30 INJECTION, SOLUTION INTRAVENOUS CONTINUOUS PRN
OUTPATIENT
Start: 2025-06-16 | End: 2025-06-16

## 2025-05-01 RX ORDER — AMIODARONE HYDROCHLORIDE 200 MG/1
200 TABLET ORAL DAILY
COMMUNITY
Start: 2025-04-15

## 2025-05-01 NOTE — TELEPHONE ENCOUNTER
Surgery orders are in for 6/16/2025.  Please call with prework information.  Last dose of Xarelto should be taken on 6/10/2025.  Will need dental clearance before surgery.  Carotid, vein mapping, TAVR CT has also been ordered to be done before surgery

## 2025-05-01 NOTE — PROGRESS NOTES
"Cardiac Surgery Consultation    Referring Physician: Dr. Guerrero Land    Primary Care Physician: Dr. Daisy Groves    Chief Complaint   Patient presents with    Coronary Artery Disease     New patient referred from Dr. Land         Subjective     History of Present Illness        Review of Systems     A complete review of systems was performed, is negative except stated above.    No past medical history on file.  No past surgical history on file.  No family history on file.  Social History     Tobacco Use    Smoking status: Never     Passive exposure: Past (Childhood)    Smokeless tobacco: Never   Vaping Use    Vaping status: Never Used   Substance Use Topics    Alcohol use: Not Currently     Comment: Socially    Drug use: Never     Comment: No per pt     Current Outpatient Medications   Medication Sig Dispense Refill    amiodarone (PACERONE) 200 MG tablet Take 1 tablet by mouth Daily.      benazepril (LOTENSIN) 5 MG tablet Take 1 tablet by mouth Daily.      citalopram (CeleXA) 10 MG tablet Take 0.5 tablets by mouth Daily.      ezetimibe (ZETIA) 10 MG tablet Take 1 tablet by mouth Daily.      furosemide (LASIX) 40 MG tablet Take 1 tablet by mouth Daily.      Metoprolol Succinate 25 MG capsule extended-release 24 hour sprinkle Take 25 mg by mouth Daily.      rivaroxaban (Xarelto) 20 MG tablet Take 1 tablet by mouth Daily.      Rosuvastatin Calcium 10 MG capsule sprinkle Take 10 mg by mouth Daily.      traZODone (DESYREL) 50 MG tablet Take 1 tablet by mouth Every Night.       No current facility-administered medications for this visit.     Allergies:  Zolpidem, Clarithromycin, and Esomeprazole    Objective      Vital Signs  Visit Vitals  /84   Pulse 59   Ht 180.3 cm (71\")   Wt 110 kg (242 lb)   SpO2 95%   BMI 33.75 kg/m²         Physical Exam   Physical Exam      Results Review:     WBC   Date Value Ref Range Status   04/17/2024 6.1 4.8 - 10.8 K/uL Final     RBC   Date Value Ref Range Status   04/17/2024 4.93 " "4.70 - 6.10 M/uL Final     Hemoglobin   Date Value Ref Range Status   05/07/2024 10.4 (L) 14.0 - 18.0 g/dL Final     Hematocrit   Date Value Ref Range Status   05/07/2024 32.6 (L) 42.0 - 52.0 % Final     MCV   Date Value Ref Range Status   04/17/2024 91.7 80.0 - 94.0 fL Final     MCH   Date Value Ref Range Status   04/17/2024 29.8 27.0 - 31.0 pg Final     MCHC   Date Value Ref Range Status   04/17/2024 32.5 (L) 33.0 - 37.0 g/dL Final     RDW   Date Value Ref Range Status   04/17/2024 14.8 (H) 11.5 - 14.5 % Final     MPV   Date Value Ref Range Status   04/17/2024 10.5 9.4 - 12.4 fL Final     Platelets   Date Value Ref Range Status   04/17/2024 191 130 - 400 K/uL Final     No results found for: \"GLUCOSE\", \"NA\", \"K\", \"CO2\", \"CL\", \"ANIONGAP\", \"CREATININE\", \"BUN\", \"BCR\", \"CALCIUM\", \"EGFRIFNONA\", \"ALKPHOS\", \"PROTEINTOT\", \"ALT\", \"AST\", \"BILITOT\", \"ALBUMIN\", \"GLOB\", \"LABIL2\"     I reviewed the patient's clinical results and discussed with patient.    Results  I personally reviewed coronary angiography the following is my interpretation:  Right dominant coronary system no significant obstructive disease in right coronary artery, circumflex without significant obstructive disease, LAD with some diffuse disease with 1 moderate diagonal artery, there is LAD disease just past the branching point of the moderate-sized diagonal distal vessel is somewhat small but likely graftable    I personally viewed echocardiogram the following is my interpretation:  Normal LV function, highly calcified aortic valve with limited leaflet mobility difficult to say if fused bicuspid or tricuspid, V-max greater than 400 mean gradient is 46 and aortic valve area is less than 1 I do not appreciate more than mild MR and no more than mild AI        Assessment & Plan       Assessment & Plan  Mr. Montelongo is an 79-year-old male who presents to me with severe symptomatic aortic valve stenosis and severe LAD stenosis.  He has had a murmur since late teenage " years noticed when he was being evaluated to go to [place].  More recently he has had increasing tiredness, some chest pressure with activity shortness of breath with activity, he has class III NYHA heart failure symptoms.  With this he underwent workup and echocardiogram is consistent with severe aortic stenosis, normal LV function and subsequent coronary angiography shows some diffuse mid LAD disease with a graftable distal vessel.  He has has a history of carotid endarterectomy also has a history of paroxysmal atrial fibrillation.  He is active for his age.    I discussed with Mr. Montelongo and his significant other today the natural history of severe symptomatic aortic stenosis and coronary disease and treatment options.  We discussed the possibility of PCI with TAVR versus SAVR with CABG.  I think that he is overall low risk for surgery and therefore I have recommended SAVR with CABG.  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  We discussed operative expectations risk and benefits at length. We discussed the risk of surgery including but not limited to bleeding, infection, injury to major organs or vessels, chronic heart failure, arrhythmias, need for pacemaker, prolonged ventilation, renal failure, stroke, risk of anesthesia, risk of sternal wound or bone healing problems, reoperation, and/or death. I calculated his STS patient specific risks score, discussed this with him; he understands and wants to proceed with surgery.    Will complete his workup and obtain dental clearance and then plan on surgery soon.  Thank you for trusting me with the care of Mr. Montelongo.  Please do not hesitate to call questions or concerns.          Andrew Mcneil MD   Cardiothoracic Surgeon        Patient or patient representative verbalized consent for the use of Ambient Listening during the visit with  Andrew Mcneil MD for chart documentation. 5/1/2025   10:45 CDT

## 2025-05-01 NOTE — LETTER
May 11, 2025     Daisy Groves MD  [address]  [address] [address]  [facility] [address]    Patient: Linden Montelongo   YOB: 1945   Date of Visit: 5/1/2025       Dear Daisy Groves MD,    Linden Montelongo was in my office today. Below are the relevant portions of my assessment and plan of care.    Mr. Montelongo is an 79-year-old male who presents to me with severe symptomatic aortic valve stenosis and severe LAD stenosis.  He has had a murmur since late teenage years noticed when he was being evaluated to go to [employer].  More recently he has had increasing tiredness, some chest pressure with activity shortness of breath with activity, he has class III NYHA heart failure symptoms.  With this he underwent workup and echocardiogram is consistent with severe aortic stenosis, normal LV function and subsequent coronary angiography shows some diffuse mid LAD disease with a graftable distal vessel.  He has has a history of carotid endarterectomy also has a history of paroxysmal atrial fibrillation.  He is active for his age.    I discussed with Mr. Montelongo and his significant other today the natural history of severe symptomatic aortic stenosis and coronary disease and treatment options.  We discussed the possibility of PCI with TAVR versus SAVR with CABG.  I think that he is overall low risk for surgery and therefore I have recommended SAVR with CABG.  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  We discussed operative expectations risk and benefits at length. We discussed the risk of surgery including but not limited to bleeding, infection, injury to major organs or vessels, chronic heart failure, arrhythmias, need for pacemaker, prolonged ventilation, renal failure, stroke, risk of anesthesia, risk of sternal wound or bone healing problems, reoperation, and/or death. I calculated his STS patient specific risks score, discussed this with him; he  understands and wants to proceed with surgery.    Will complete his workup and obtain dental clearance and then plan on surgery soon.  Thank you for trusting me with the care of Mr. Montelongo.  Please do not hesitate to call questions or concerns.         Sincerely,        Andrew Mcneil MD        CC: Guerrero Land MD   complains of pain/discomfort

## 2025-05-02 NOTE — TELEPHONE ENCOUNTER
Linden Montelongo is aware of prework date/time and surgery date 06/16/2025, arrival at 0500. Pt is aware of Bactroban prescription and instructions for use at 1700 the evening prior to surgery. Instructed pt to take last dose of Xarelto on 06/10/2025. Informed him that we need dental clearance to confirm lack of dental infection. I have requested that once his dental appointment has been made to call our office and let us know when and where it is so that we can assist with following up with this. I provided our fax number. Pt is aware nothing to eat or drink and no medications after midnight prior to surgery. Offered to send this information via [employer] and pt accepted. Pt voiced understanding to all.

## 2025-06-03 ENCOUNTER — HOSPITAL ENCOUNTER (OUTPATIENT)
Dept: ULTRASOUND IMAGING | Facility: HOSPITAL | Age: 80
Discharge: HOME | End: 2025-06-03
Payer: MEDICARE

## 2025-06-03 ENCOUNTER — HOSPITAL ENCOUNTER (OUTPATIENT)
Dept: CT IMAGING | Facility: HOSPITAL | Age: 80
Discharge: HOME | End: 2025-06-03
Payer: MEDICARE

## 2025-06-03 DIAGNOSIS — I25.10 CORONARY ARTERY DISEASE INVOLVING NATIVE CORONARY ARTERY OF NATIVE HEART WITHOUT ANGINA PECTORIS: ICD-10-CM

## 2025-06-03 DIAGNOSIS — I79.8 OTHER DISORDERS OF ARTERIES, ARTERIOLES AND CAPILLARIES IN DISEASES CLASSIFIED ELSEWHERE: ICD-10-CM

## 2025-06-03 PROCEDURE — 71275 CT ANGIOGRAPHY CHEST: CPT

## 2025-06-03 PROCEDURE — 93970 EXTREMITY STUDY: CPT | Performed by: STUDENT IN AN ORGANIZED HEALTH CARE EDUCATION/TRAINING PROGRAM

## 2025-06-03 PROCEDURE — 93880 EXTRACRANIAL BILAT STUDY: CPT

## 2025-06-03 PROCEDURE — 93880 EXTRACRANIAL BILAT STUDY: CPT | Performed by: STUDENT IN AN ORGANIZED HEALTH CARE EDUCATION/TRAINING PROGRAM

## 2025-06-03 PROCEDURE — 74174 CTA ABD&PLVS W/CONTRAST: CPT

## 2025-06-03 PROCEDURE — 25510000001 IOPAMIDOL PER 1 ML: Performed by: NURSE PRACTITIONER

## 2025-06-03 PROCEDURE — 93970 EXTREMITY STUDY: CPT

## 2025-06-03 RX ORDER — IOPAMIDOL 755 MG/ML
100 INJECTION, SOLUTION INTRAVASCULAR
Status: COMPLETED | OUTPATIENT
Start: 2025-06-03 | End: 2025-06-03

## 2025-06-03 RX ADMIN — IOPAMIDOL 100 ML: 755 INJECTION, SOLUTION INTRAVENOUS at 11:27

## 2025-06-13 ENCOUNTER — HOSPITAL ENCOUNTER (OUTPATIENT)
Dept: PULMONOLOGY | Facility: HOSPITAL | Age: 80
Discharge: HOME | End: 2025-06-13
Payer: MEDICARE

## 2025-06-13 ENCOUNTER — HOSPITAL ENCOUNTER (OUTPATIENT)
Dept: GENERAL RADIOLOGY | Facility: HOSPITAL | Age: 80
Discharge: HOME | End: 2025-06-13
Payer: MEDICARE

## 2025-06-13 ENCOUNTER — PRE-ADMISSION TESTING (OUTPATIENT)
Dept: PREADMISSION TESTING | Facility: HOSPITAL | Age: 80
DRG: 220 | End: 2025-06-13
Payer: MEDICARE

## 2025-06-13 ENCOUNTER — ANESTHESIA EVENT (OUTPATIENT)
Dept: PERIOP | Facility: HOSPITAL | Age: 80
End: 2025-06-13
Payer: MEDICARE

## 2025-06-13 VITALS
DIASTOLIC BLOOD PRESSURE: 72 MMHG | HEART RATE: 70 BPM | SYSTOLIC BLOOD PRESSURE: 144 MMHG | OXYGEN SATURATION: 96 % | RESPIRATION RATE: 16 BRPM | HEIGHT: 70 IN | WEIGHT: 238.1 LBS | BODY MASS INDEX: 34.09 KG/M2

## 2025-06-13 DIAGNOSIS — I35.0 AORTIC VALVE STENOSIS: ICD-10-CM

## 2025-06-13 DIAGNOSIS — R06.02 SOB (SHORTNESS OF BREATH): ICD-10-CM

## 2025-06-13 DIAGNOSIS — I25.10 CORONARY ARTERY DISEASE INVOLVING NATIVE CORONARY ARTERY OF NATIVE HEART WITHOUT ANGINA PECTORIS: ICD-10-CM

## 2025-06-13 LAB
ABO GROUP BLD: NORMAL
ALBUMIN SERPL-MCNC: 4.3 G/DL (ref 3.5–5.2)
ALBUMIN/GLOB SERPL: 1.7 G/DL
ALP SERPL-CCNC: 59 U/L (ref 39–117)
ALT SERPL W P-5'-P-CCNC: 18 U/L (ref 1–41)
ANION GAP SERPL CALCULATED.3IONS-SCNC: 12 MMOL/L (ref 5–15)
APTT PPP: 37.2 SECONDS (ref 24.5–36)
ARTERIAL PATENCY WRIST A: ABNORMAL
AST SERPL-CCNC: 15 U/L (ref 1–40)
ATMOSPHERIC PRESS: 751 MMHG
BASE EXCESS BLDA CALC-SCNC: 1.4 MMOL/L (ref 0–2)
BASOPHILS # BLD AUTO: 0.04 10*3/MM3 (ref 0–0.2)
BASOPHILS NFR BLD AUTO: 0.7 % (ref 0–1.5)
BDY SITE: ABNORMAL
BILIRUB SERPL-MCNC: 0.5 MG/DL (ref 0–1.2)
BILIRUB UR QL STRIP: NEGATIVE
BLD GP AB SCN SERPL QL: NEGATIVE
BODY TEMPERATURE: 37
BUN SERPL-MCNC: 20.7 MG/DL (ref 8–23)
BUN/CREAT SERPL: 18.6 (ref 7–25)
CALCIUM SPEC-SCNC: 8.9 MG/DL (ref 8.6–10.5)
CHLORIDE SERPL-SCNC: 103 MMOL/L (ref 98–107)
CLARITY UR: CLEAR
CO2 SERPL-SCNC: 25 MMOL/L (ref 22–29)
COLOR UR: YELLOW
CREAT SERPL-MCNC: 1.11 MG/DL (ref 0.76–1.27)
DEPRECATED RDW RBC AUTO: 49.7 FL (ref 37–54)
EGFRCR SERPLBLD CKD-EPI 2021: 67.1 ML/MIN/1.73
EOSINOPHIL # BLD AUTO: 0.17 10*3/MM3 (ref 0–0.4)
EOSINOPHIL NFR BLD AUTO: 3.1 % (ref 0.3–6.2)
ERYTHROCYTE [DISTWIDTH] IN BLOOD BY AUTOMATED COUNT: 15 % (ref 12.3–15.4)
GLOBULIN UR ELPH-MCNC: 2.6 GM/DL
GLUCOSE SERPL-MCNC: 102 MG/DL (ref 65–99)
GLUCOSE UR STRIP-MCNC: NEGATIVE MG/DL
HCO3 BLDA-SCNC: 25.9 MMOL/L (ref 20–26)
HCT VFR BLD AUTO: 42.2 % (ref 37.5–51)
HGB BLD-MCNC: 14 G/DL (ref 13–17.7)
HGB UR QL STRIP.AUTO: NEGATIVE
IMM GRANULOCYTES # BLD AUTO: 0.03 10*3/MM3 (ref 0–0.05)
IMM GRANULOCYTES NFR BLD AUTO: 0.5 % (ref 0–0.5)
INR PPP: 0.91 (ref 0.91–1.09)
KETONES UR QL STRIP: NEGATIVE
LEUKOCYTE ESTERASE UR QL STRIP.AUTO: NEGATIVE
LYMPHOCYTES # BLD AUTO: 1.39 10*3/MM3 (ref 0.7–3.1)
LYMPHOCYTES NFR BLD AUTO: 25 % (ref 19.6–45.3)
Lab: ABNORMAL
MCH RBC QN AUTO: 29.5 PG (ref 26.6–33)
MCHC RBC AUTO-ENTMCNC: 33.2 G/DL (ref 31.5–35.7)
MCV RBC AUTO: 89 FL (ref 79–97)
MODALITY: ABNORMAL
MONOCYTES # BLD AUTO: 0.37 10*3/MM3 (ref 0.1–0.9)
MONOCYTES NFR BLD AUTO: 6.6 % (ref 5–12)
NEUTROPHILS NFR BLD AUTO: 3.57 10*3/MM3 (ref 1.7–7)
NEUTROPHILS NFR BLD AUTO: 64.1 % (ref 42.7–76)
NITRITE UR QL STRIP: NEGATIVE
NRBC BLD AUTO-RTO: 0 /100 WBC (ref 0–0.2)
PCO2 BLDA: 39.6 MM HG (ref 35–45)
PCO2 TEMP ADJ BLD: 39.6 MM HG (ref 35–45)
PH BLDA: 7.42 PH UNITS (ref 7.35–7.45)
PH UR STRIP.AUTO: 5.5 [PH] (ref 5–8)
PH, TEMP CORRECTED: 7.42 PH UNITS (ref 7.35–7.45)
PLATELET # BLD AUTO: 176 10*3/MM3 (ref 140–450)
PMV BLD AUTO: 11.1 FL (ref 6–12)
PO2 BLDA: 74.4 MM HG (ref 83–108)
PO2 TEMP ADJ BLD: 74.4 MM HG (ref 83–108)
POTASSIUM SERPL-SCNC: 3.8 MMOL/L (ref 3.5–5.2)
PROT SERPL-MCNC: 6.9 G/DL (ref 6–8.5)
PROT UR QL STRIP: NEGATIVE
PROTHROMBIN TIME: 12.8 SECONDS (ref 11.8–14.8)
QT INTERVAL: 484 MS
QTC INTERVAL: 467 MS
RBC # BLD AUTO: 4.74 10*6/MM3 (ref 4.14–5.8)
RH BLD: POSITIVE
SAO2 % BLDCOA: 95.4 % (ref 94–99)
SODIUM SERPL-SCNC: 140 MMOL/L (ref 136–145)
SP GR UR STRIP: 1.01 (ref 1–1.03)
T&S EXPIRATION DATE: NORMAL
UROBILINOGEN UR QL STRIP: NORMAL
VENTILATOR MODE: ABNORMAL
WBC NRBC COR # BLD AUTO: 5.57 10*3/MM3 (ref 3.4–10.8)

## 2025-06-13 PROCEDURE — 82803 BLOOD GASES ANY COMBINATION: CPT | Performed by: SURGERY

## 2025-06-13 PROCEDURE — 93005 ELECTROCARDIOGRAM TRACING: CPT

## 2025-06-13 PROCEDURE — 86901 BLOOD TYPING SEROLOGIC RH(D): CPT

## 2025-06-13 PROCEDURE — 85610 PROTHROMBIN TIME: CPT

## 2025-06-13 PROCEDURE — 36600 WITHDRAWAL OF ARTERIAL BLOOD: CPT | Performed by: SURGERY

## 2025-06-13 PROCEDURE — 81003 URINALYSIS AUTO W/O SCOPE: CPT

## 2025-06-13 PROCEDURE — 93010 ELECTROCARDIOGRAM REPORT: CPT | Performed by: INTERNAL MEDICINE

## 2025-06-13 PROCEDURE — 86850 RBC ANTIBODY SCREEN: CPT

## 2025-06-13 PROCEDURE — 85025 COMPLETE CBC W/AUTO DIFF WBC: CPT

## 2025-06-13 PROCEDURE — 71046 X-RAY EXAM CHEST 2 VIEWS: CPT

## 2025-06-13 PROCEDURE — 80053 COMPREHEN METABOLIC PANEL: CPT

## 2025-06-13 PROCEDURE — 36415 COLL VENOUS BLD VENIPUNCTURE: CPT

## 2025-06-13 PROCEDURE — 85730 THROMBOPLASTIN TIME PARTIAL: CPT

## 2025-06-13 PROCEDURE — 86900 BLOOD TYPING SEROLOGIC ABO: CPT

## 2025-06-13 NOTE — DISCHARGE INSTRUCTIONS
Preparing for Surgery  Follow these instructions before the procedure:  Several days or weeks before your procedure      Ask your health care provider about:  Changing or stopping your regular medicines. This is especially important if you are taking diabetes medicines or blood thinners.  Taking medicines such as aspirin and ibuprofen. These medicines can thin your blood. Do not take these medicines unless your health care provider tells you to take them.  Taking over-the-counter medicines, vitamins, herbs, and supplements.    Contact your surgeon if you:  Develop a fever of more than 100.4°F (38°C) or other feelings of illness during the 48 hours before your surgery.  Have symptoms that get worse.  Have questions or concerns about your surgery.  If you are going home the same day of your surgery you will need to arrange for a responsible adult, age 18 years old or older, to drive you home from the hospital and stay with you for 24 hours. Verification of the  will be made prior to any procedure requiring sedation. You may not go home in a taxi or any form of public transportation by yourself.     Day before your procedure  Medication(s) you need to stop the day before your surgery:  Benazepril (LOTENSIN)    24 hours before your procedure DO NOT drink alcoholic beverages or smoke.  24 hours before your procedure STOP taking Erectile Dysfunction medication (i.e.,Cialis, Viagra)   You may be asked to shower with a germ-killing soap.  Day of your procedure     You may need to take another shower with a germ-killing soap before you leave home in the morning. Do not use perfumes, colognes, or body lotions.  Wear comfortable loose-fitting clothing.  Remove all jewelry including body piercing and rings, dark colored nail polish, and make up prior to arrival at the hospital. Leave all valuables at home.   Bring your hearing aids if you rely on them.  Do not wear contact lenses. If you wear eyeglasses remember to bring  a case to store them in while you are in surgery.  Do not use denture adhesives since you will be asked to remove them during your surgery.    You do not need to bring your home medications into the hospital.   Bring your sleep apnea device with you on the day of your surgery (if this applies to you).  If you have an Inspire implant for sleep apnea, please bring the remote with you on the day of surgery.  If you wear portable oxygen, bring it with you.   If you are staying overnight, you may bring a bag of items you may need such as slippers, robe and a change of clothes for your discharge. You may want to leave these items in the car until you are ready for them since your family will take your belongings when you leave the pre-operative area.  Arrive at the hospital as scheduled by the office. You will be asked to arrive 2 hours prior to your surgery time in order to prepare for your procedure.  When you arrive at the hospital  Go to the registration desk located at the main entrance of the hospital.  After registration is completed, you will be given a beeper and a sticker sheet. Take the stickers to Outpatient Surgery and place in the tray at the end of the desk to notify the staff that you have arrived and registered.   Return to the lobby to wait. You are not always called back according to the time of arrival but rather the time your doctor will be ready.  When your beeper lights up and vibrates proceed through the double doors, under the stairs, and a member of the Outpatient Surgery staff will escort you to your preoperative room.

## 2025-06-13 NOTE — ANESTHESIA PREPROCEDURE EVALUATION
Anesthesia Evaluation     no history of anesthetic complications:                Airway   Mallampati: III  TM distance: >3 FB  Neck ROM: full  Possible difficult intubation  Dental          Pulmonary    (+) ,sleep apnea on CPAP  (-) asthma, not a smoker  Cardiovascular   Exercise tolerance: good (4-7 METS)    (+) hypertension, valvular problems/murmurs AS, CAD, dysrhythmias Atrial Fib, hyperlipidemia    ROS comment: Severe AS  Proximal 70% LAD stenosis    Per Dr Mcneil's note:  I personally reviewed coronary angiography the following is my interpretation:  Right dominant coronary system no significant obstructive disease in right coronary artery, circumflex without significant obstructive disease, LAD with some diffuse disease with 1 moderate diagonal artery, there is LAD disease just past the branching point of the moderate-sized diagonal distal vessel is somewhat small but likely graftable     I personally viewed echocardiogram the following is my interpretation:  Normal LV function, highly calcified aortic valve with limited leaflet mobility difficult to say if fused bicuspid or tricuspid, V-max greater than 400 mean gradient is 46 and aortic valve area is less than 1 I do not appreciate more than mild MR and no more than mild AI      Neuro/Psych  (-) seizures, TIA, CVA  GI/Hepatic/Renal/Endo    (+) obesity  (-) liver disease, no renal disease, diabetes    Musculoskeletal     Abdominal    Substance History      OB/GYN          Other                      Anesthesia Plan    ASA 4     general     (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 He does not have any issues with swallowing, and has had an EGD since then without any trouble)  intravenous induction     Anesthetic plan, risks, benefits, and alternatives have been provided, discussed and informed consent has been obtained with: patient.      CODE STATUS:

## 2025-06-16 ENCOUNTER — APPOINTMENT (OUTPATIENT)
Dept: CARDIOLOGY | Facility: HOSPITAL | Age: 80
End: 2025-06-16
Payer: MEDICARE

## 2025-06-16 ENCOUNTER — ANESTHESIA (OUTPATIENT)
Dept: PERIOP | Facility: HOSPITAL | Age: 80
End: 2025-06-16
Payer: MEDICARE

## 2025-06-16 ENCOUNTER — APPOINTMENT (OUTPATIENT)
Dept: GENERAL RADIOLOGY | Facility: HOSPITAL | Age: 80
End: 2025-06-16
Payer: MEDICARE

## 2025-06-16 ENCOUNTER — HOSPITAL ENCOUNTER (INPATIENT)
Facility: HOSPITAL | Age: 80
LOS: 13 days | Discharge: HOME | End: 2025-06-29
Attending: SURGERY | Admitting: SURGERY
Payer: MEDICARE

## 2025-06-16 DIAGNOSIS — I25.10 CORONARY ARTERY DISEASE INVOLVING NATIVE CORONARY ARTERY OF NATIVE HEART WITHOUT ANGINA PECTORIS: ICD-10-CM

## 2025-06-16 DIAGNOSIS — Z95.1 S/P CABG (CORONARY ARTERY BYPASS GRAFT): ICD-10-CM

## 2025-06-16 DIAGNOSIS — Z74.09 IMPAIRED MOBILITY: Primary | ICD-10-CM

## 2025-06-16 DIAGNOSIS — I35.0 AORTIC VALVE STENOSIS: ICD-10-CM

## 2025-06-16 LAB
A-A DO2: ABNORMAL
ALBUMIN SERPL-MCNC: 3 G/DL (ref 3.5–5.2)
ALBUMIN SERPL-MCNC: 3.9 G/DL (ref 3.5–5.2)
ANION GAP SERPL CALCULATED.3IONS-SCNC: 10 MMOL/L (ref 5–15)
ANION GAP SERPL CALCULATED.3IONS-SCNC: 10 MMOL/L (ref 5–15)
ANISOCYTOSIS BLD QL: ABNORMAL
APTT PPP: 44.1 SECONDS (ref 24.5–36)
ARTERIAL PATENCY WRIST A: ABNORMAL
ATMOSPHERIC PRESS: 750 MMHG
ATMOSPHERIC PRESS: 750 MMHG
ATMOSPHERIC PRESS: 751 MMHG
ATMOSPHERIC PRESS: 752 MMHG
AV MEAN PRESS GRAD SYS DOP V1V2: 22.3 MMHG
AV VMAX SYS DOP: 406 CM/SEC
BASE EXCESS BLDA CALC-SCNC: -0.2 MMOL/L (ref 0–2)
BASE EXCESS BLDA CALC-SCNC: -0.3 MMOL/L (ref 0–2)
BASE EXCESS BLDA CALC-SCNC: -1.5 MMOL/L (ref 0–2)
BASE EXCESS BLDA CALC-SCNC: -1.6 MMOL/L (ref 0–2)
BASE EXCESS BLDA CALC-SCNC: -2.3 MMOL/L (ref 0–2)
BASE EXCESS BLDA CALC-SCNC: -2.7 MMOL/L (ref 0–2)
BASE EXCESS BLDA CALC-SCNC: 1.2 MMOL/L (ref 0–2)
BASE EXCESS BLDA CALC-SCNC: 1.3 MMOL/L (ref 0–2)
BASOPHILS # BLD AUTO: 0.02 10*3/MM3 (ref 0–0.2)
BASOPHILS NFR BLD AUTO: 0.2 % (ref 0–1.5)
BDY SITE: ABNORMAL
BH CV ECHO MEAS - AO MAX PG: 65.9 MMHG
BH CV ECHO MEAS - AO V2 VTI: 69.8 CM
BODY TEMPERATURE: 37
BUN SERPL-MCNC: 14.4 MG/DL (ref 8–23)
BUN SERPL-MCNC: 14.4 MG/DL (ref 8–23)
BUN/CREAT SERPL: 17.1 (ref 7–25)
BUN/CREAT SERPL: 17.3 (ref 7–25)
CA-I BLD-MCNC: 4.22 MG/DL (ref 4.6–5.4)
CA-I BLD-MCNC: 4.25 MG/DL (ref 4.6–5.4)
CA-I BLD-MCNC: 4.48 MG/DL (ref 4.6–5.4)
CA-I BLD-MCNC: 4.57 MG/DL (ref 4.6–5.4)
CA-I BLD-MCNC: 4.63 MG/DL (ref 4.6–5.4)
CA-I BLD-MCNC: 4.89 MG/DL (ref 4.6–5.4)
CALCIUM SPEC-SCNC: 8 MG/DL (ref 8.6–10.5)
CALCIUM SPEC-SCNC: 8.7 MG/DL (ref 8.6–10.5)
CHLORIDE SERPL-SCNC: 109 MMOL/L (ref 98–107)
CHLORIDE SERPL-SCNC: 110 MMOL/L (ref 98–107)
CO2 SERPL-SCNC: 21 MMOL/L (ref 22–29)
CO2 SERPL-SCNC: 21 MMOL/L (ref 22–29)
COHGB MFR BLD: 0 % (ref 0–5)
COHGB MFR BLD: 0 % (ref 0–5)
COHGB MFR BLD: 0.1 % (ref 0–5)
COHGB MFR BLD: 0.2 % (ref 0–5)
COHGB MFR BLD: 0.3 % (ref 0–5)
COHGB MFR BLD: 0.3 % (ref 0–5)
CPAP: ABNORMAL
CREAT SERPL-MCNC: 0.83 MG/DL (ref 0.76–1.27)
CREAT SERPL-MCNC: 0.84 MG/DL (ref 0.76–1.27)
DEPRECATED RDW RBC AUTO: 50.9 FL (ref 37–54)
DEPRECATED RDW RBC AUTO: 51.5 FL (ref 37–54)
DEPRECATED RDW RBC AUTO: 52.3 FL (ref 37–54)
EGFRCR SERPLBLD CKD-EPI 2021: 88.2 ML/MIN/1.73
EGFRCR SERPLBLD CKD-EPI 2021: 88.5 ML/MIN/1.73
EOSINOPHIL # BLD AUTO: 0.08 10*3/MM3 (ref 0–0.4)
EOSINOPHIL # BLD MANUAL: 0.18 10*3/MM3 (ref 0–0.4)
EOSINOPHIL NFR BLD AUTO: 0.9 % (ref 0.3–6.2)
EOSINOPHIL NFR BLD MANUAL: 2 % (ref 0.3–6.2)
EPAP: ABNORMAL
ERYTHROCYTE [DISTWIDTH] IN BLOOD BY AUTOMATED COUNT: 15.2 % (ref 12.3–15.4)
ERYTHROCYTE [DISTWIDTH] IN BLOOD BY AUTOMATED COUNT: 15.4 % (ref 12.3–15.4)
ERYTHROCYTE [DISTWIDTH] IN BLOOD BY AUTOMATED COUNT: 15.6 % (ref 12.3–15.4)
FIBRINOGEN PPP-MCNC: 181 MG/DL (ref 240–460)
FIBRINOGEN PPP-MCNC: 192 MG/DL (ref 240–460)
GAS FLOW AIRWAY: 2.8 LPM
GAS FLOW AIRWAY: 3 LPM
GAS FLOW AIRWAY: ABNORMAL L/MIN
GLUCOSE BLDA-MCNC: 102 MG/DL (ref 65–99)
GLUCOSE BLDA-MCNC: 109 MG/DL (ref 65–99)
GLUCOSE BLDA-MCNC: 117 MG/DL (ref 65–99)
GLUCOSE BLDA-MCNC: 120 MG/DL (ref 65–99)
GLUCOSE BLDA-MCNC: 125 MG/DL (ref 65–99)
GLUCOSE BLDC GLUCOMTR-MCNC: 132 MG/DL (ref 70–130)
GLUCOSE BLDC GLUCOMTR-MCNC: 148 MG/DL (ref 70–130)
GLUCOSE BLDC GLUCOMTR-MCNC: 161 MG/DL (ref 70–130)
GLUCOSE BLDC GLUCOMTR-MCNC: 164 MG/DL (ref 70–130)
GLUCOSE BLDC GLUCOMTR-MCNC: 164 MG/DL (ref 70–130)
GLUCOSE BLDC GLUCOMTR-MCNC: 167 MG/DL (ref 70–130)
GLUCOSE BLDC GLUCOMTR-MCNC: 174 MG/DL (ref 70–130)
GLUCOSE SERPL-MCNC: 126 MG/DL (ref 65–99)
GLUCOSE SERPL-MCNC: 169 MG/DL (ref 65–99)
HCO3 BLDA-SCNC: 23.2 MMOL/L (ref 20–26)
HCO3 BLDA-SCNC: 23.5 MMOL/L (ref 20–26)
HCO3 BLDA-SCNC: 23.9 MMOL/L (ref 20–26)
HCO3 BLDA-SCNC: 24.4 MMOL/L (ref 20–26)
HCO3 BLDA-SCNC: 25.1 MMOL/L (ref 20–26)
HCO3 BLDA-SCNC: 25.5 MMOL/L (ref 20–26)
HCO3 BLDA-SCNC: 25.9 MMOL/L (ref 20–26)
HCO3 BLDA-SCNC: 26.3 MMOL/L (ref 20–26)
HCT VFR BLD AUTO: 27.1 % (ref 37.5–51)
HCT VFR BLD AUTO: 30 % (ref 37.5–51)
HCT VFR BLD AUTO: 30.3 % (ref 37.5–51)
HCT VFR BLD CALC: 27.5 % (ref 38–51)
HCT VFR BLD CALC: 28 % (ref 38–51)
HCT VFR BLD CALC: 28.9 % (ref 38–51)
HCT VFR BLD CALC: 32.7 % (ref 38–51)
HCT VFR BLD CALC: 35.1 % (ref 38–51)
HCT VFR BLD CALC: 36.1 % (ref 38–51)
HGB BLD-MCNC: 9 G/DL (ref 13–17.7)
HGB BLD-MCNC: 9.8 G/DL (ref 13–17.7)
HGB BLD-MCNC: 9.9 G/DL (ref 13–17.7)
HGB BLDA-MCNC: 10.7 G/DL (ref 14–18)
HGB BLDA-MCNC: 11.4 G/DL (ref 14–18)
HGB BLDA-MCNC: 11.8 G/DL (ref 14–18)
HGB BLDA-MCNC: 9 G/DL (ref 14–18)
HGB BLDA-MCNC: 9.1 G/DL (ref 14–18)
HGB BLDA-MCNC: 9.4 G/DL (ref 14–18)
IMM GRANULOCYTES # BLD AUTO: 0.07 10*3/MM3 (ref 0–0.05)
IMM GRANULOCYTES NFR BLD AUTO: 0.8 % (ref 0–0.5)
INHALED O2 CONCENTRATION: 100 %
INHALED O2 CONCENTRATION: 30 %
INHALED O2 CONCENTRATION: 30 %
INHALED O2 CONCENTRATION: 60 %
INR PPP: 1.27 (ref 0.91–1.09)
INR PPP: 1.43 (ref 0.91–1.09)
IPAP: ABNORMAL
LACTATE BLDA-SCNC: 0.8 MMOL/L (ref 0.5–2)
LACTATE BLDA-SCNC: 1 MMOL/L (ref 0.5–2)
LACTATE BLDA-SCNC: 1 MMOL/L (ref 0.5–2)
LACTATE BLDA-SCNC: 1.2 MMOL/L (ref 0.5–2)
LACTATE BLDA-SCNC: 1.3 MMOL/L (ref 0.5–2)
LYMPHOCYTES # BLD AUTO: 1.25 10*3/MM3 (ref 0.7–3.1)
LYMPHOCYTES # BLD MANUAL: 0.61 10*3/MM3 (ref 0.7–3.1)
LYMPHOCYTES NFR BLD AUTO: 13.6 % (ref 19.6–45.3)
LYMPHOCYTES NFR BLD MANUAL: 2 % (ref 5–12)
Lab: ABNORMAL
MCH RBC QN AUTO: 29.8 PG (ref 26.6–33)
MCH RBC QN AUTO: 29.9 PG (ref 26.6–33)
MCH RBC QN AUTO: 30.3 PG (ref 26.6–33)
MCHC RBC AUTO-ENTMCNC: 32.3 G/DL (ref 31.5–35.7)
MCHC RBC AUTO-ENTMCNC: 33 G/DL (ref 31.5–35.7)
MCHC RBC AUTO-ENTMCNC: 33.2 G/DL (ref 31.5–35.7)
MCV RBC AUTO: 90.6 FL (ref 79–97)
MCV RBC AUTO: 91.2 FL (ref 79–97)
MCV RBC AUTO: 92.1 FL (ref 79–97)
METHGB BLD QL: 1.2 % (ref 0–3)
METHGB BLD QL: 1.3 % (ref 0–3)
MODALITY: ABNORMAL
MONOCYTES # BLD AUTO: 0.35 10*3/MM3 (ref 0.1–0.9)
MONOCYTES # BLD: 0.18 10*3/MM3 (ref 0.1–0.9)
MONOCYTES NFR BLD AUTO: 3.8 % (ref 5–12)
NEUTROPHILS # BLD AUTO: 7.79 10*3/MM3 (ref 1.7–7)
NEUTROPHILS NFR BLD AUTO: 7.42 10*3/MM3 (ref 1.7–7)
NEUTROPHILS NFR BLD AUTO: 80.7 % (ref 42.7–76)
NEUTROPHILS NFR BLD MANUAL: 81 % (ref 42.7–76)
NEUTS BAND NFR BLD MANUAL: 8 % (ref 0–5)
NITRIC OXIDE: ABNORMAL
NOTE: ABNORMAL
NOTIFIED BY: ABNORMAL
NOTIFIED WHO: ABNORMAL
NRBC BLD AUTO-RTO: 0 /100 WBC (ref 0–0.2)
OXYHGB MFR BLDV: 96.3 % (ref 94–99)
OXYHGB MFR BLDV: 97.9 % (ref 94–99)
OXYHGB MFR BLDV: 98 % (ref 94–99)
OXYHGB MFR BLDV: 98.3 % (ref 94–99)
PAW @ PEAK INSP FLOW SETTING VENT: ABNORMAL CM[H2O]
PCO2 BLDA: 37.7 MM HG (ref 35–45)
PCO2 BLDA: 39.1 MM HG (ref 35–45)
PCO2 BLDA: 40.8 MM HG (ref 35–45)
PCO2 BLDA: 41.8 MM HG (ref 35–45)
PCO2 BLDA: 43.1 MM HG (ref 35–45)
PCO2 BLDA: 43.6 MM HG (ref 35–45)
PCO2 BLDA: 44.4 MM HG (ref 35–45)
PCO2 BLDA: 59.1 MM HG (ref 35–45)
PCO2 TEMP ADJ BLD: 37.7 MM HG (ref 35–45)
PCO2 TEMP ADJ BLD: 39.1 MM HG (ref 35–45)
PCO2 TEMP ADJ BLD: 40.8 MM HG (ref 35–45)
PCO2 TEMP ADJ BLD: 41.8 MM HG (ref 35–45)
PCO2 TEMP ADJ BLD: 43.1 MM HG (ref 35–45)
PCO2 TEMP ADJ BLD: 43.6 MM HG (ref 35–45)
PCO2 TEMP ADJ BLD: 44.4 MM HG (ref 35–45)
PCO2 TEMP ADJ BLD: 59.1 MM HG (ref 35–45)
PEEP RESPIRATORY: 10 CM[H2O]
PEEP RESPIRATORY: 10 CM[H2O]
PEEP RESPIRATORY: 8 CM[H2O]
PEEP RESPIRATORY: ABNORMAL CM[H2O]
PH BLDA: 7.26 PH UNITS (ref 7.35–7.45)
PH BLDA: 7.33 PH UNITS (ref 7.35–7.45)
PH BLDA: 7.34 PH UNITS (ref 7.35–7.45)
PH BLDA: 7.37 PH UNITS (ref 7.35–7.45)
PH BLDA: 7.38 PH UNITS (ref 7.35–7.45)
PH BLDA: 7.4 PH UNITS (ref 7.35–7.45)
PH BLDA: 7.41 PH UNITS (ref 7.35–7.45)
PH BLDA: 7.44 PH UNITS (ref 7.35–7.45)
PH, TEMP CORRECTED: 7.26 PH UNITS (ref 7.35–7.45)
PH, TEMP CORRECTED: 7.33 PH UNITS (ref 7.35–7.45)
PH, TEMP CORRECTED: 7.34 PH UNITS (ref 7.35–7.45)
PH, TEMP CORRECTED: 7.37 PH UNITS (ref 7.35–7.45)
PH, TEMP CORRECTED: 7.38 PH UNITS (ref 7.35–7.45)
PH, TEMP CORRECTED: 7.4 PH UNITS (ref 7.35–7.45)
PH, TEMP CORRECTED: 7.41 PH UNITS (ref 7.35–7.45)
PH, TEMP CORRECTED: 7.44 PH UNITS (ref 7.35–7.45)
PHOSPHATE SERPL-MCNC: 3.7 MG/DL (ref 2.5–4.5)
PHOSPHATE SERPL-MCNC: 3.9 MG/DL (ref 2.5–4.5)
PLATELET # BLD AUTO: 111 10*3/MM3 (ref 140–450)
PLATELET # BLD AUTO: 123 10*3/MM3 (ref 140–450)
PLATELET # BLD AUTO: 83 10*3/MM3 (ref 140–450)
PMV BLD AUTO: 10.2 FL (ref 6–12)
PMV BLD AUTO: 10.4 FL (ref 6–12)
PMV BLD AUTO: 10.6 FL (ref 6–12)
PO2 BLD: 400 MM[HG] (ref 0–500)
PO2 BLD: 447 MM[HG] (ref 0–500)
PO2 BLDA: 113 MM HG (ref 83–108)
PO2 BLDA: 120 MM HG (ref 83–108)
PO2 BLDA: 134 MM HG (ref 83–108)
PO2 BLDA: ABNORMAL MM[HG]
PO2 TEMP ADJ BLD: 113 MM HG (ref 83–108)
PO2 TEMP ADJ BLD: 120 MM HG (ref 83–108)
PO2 TEMP ADJ BLD: 134 MM HG (ref 83–108)
PO2 TEMP ADJ BLD: 443 MM HG (ref 83–108)
PO2 TEMP ADJ BLD: 511 MM HG (ref 83–108)
PO2 TEMP ADJ BLD: >547 MM HG (ref 83–108)
POIKILOCYTOSIS BLD QL SMEAR: ABNORMAL
POTASSIUM BLDA-SCNC: 3.6 MMOL/L (ref 3.5–5.2)
POTASSIUM BLDA-SCNC: 3.8 MMOL/L (ref 3.5–5.2)
POTASSIUM BLDA-SCNC: 4.1 MMOL/L (ref 3.5–5.2)
POTASSIUM BLDA-SCNC: 4.3 MMOL/L (ref 3.5–5.2)
POTASSIUM BLDA-SCNC: 4.4 MMOL/L (ref 3.5–5.2)
POTASSIUM BLDA-SCNC: 4.7 MMOL/L (ref 3.5–5.2)
POTASSIUM SERPL-SCNC: 4.4 MMOL/L (ref 3.5–5.2)
POTASSIUM SERPL-SCNC: 4.7 MMOL/L (ref 3.5–5.2)
PROTHROMBIN TIME: 16.6 SECONDS (ref 11.8–14.8)
PROTHROMBIN TIME: 18.2 SECONDS (ref 11.8–14.8)
PSV: 10 CMH2O
PSV: ABNORMAL
PULSE OX: ABNORMAL
RBC # BLD AUTO: 2.97 10*6/MM3 (ref 4.14–5.8)
RBC # BLD AUTO: 3.29 10*6/MM3 (ref 4.14–5.8)
RBC # BLD AUTO: 3.31 10*6/MM3 (ref 4.14–5.8)
SAO2 % BLDCOA: 97.9 % (ref 94–99)
SAO2 % BLDCOA: 98 % (ref 94–99)
SAO2 % BLDCOA: 98.3 % (ref 94–99)
SAO2 % BLDCOA: 99 % (ref 94–99)
SAO2 % BLDCOA: 99.2 % (ref 94–99)
SAO2 % BLDCOA: 99.4 % (ref 94–99)
SET MECH RESP RATE: 20
SET MECH RESP RATE: 20
SET MECH RESP RATE: ABNORMAL
SMALL PLATELETS BLD QL SMEAR: ABNORMAL
SODIUM BLDA-SCNC: 139 MMOL/L (ref 136–145)
SODIUM BLDA-SCNC: 139 MMOL/L (ref 136–145)
SODIUM BLDA-SCNC: 140 MMOL/L (ref 136–145)
SODIUM BLDA-SCNC: 140 MMOL/L (ref 136–145)
SODIUM BLDA-SCNC: 141 MMOL/L (ref 136–145)
SODIUM BLDA-SCNC: 142 MMOL/L (ref 136–145)
SODIUM SERPL-SCNC: 140 MMOL/L (ref 136–145)
SODIUM SERPL-SCNC: 141 MMOL/L (ref 136–145)
TOTAL RATE: ABNORMAL
VARIANT LYMPHS NFR BLD MANUAL: 7 % (ref 19.6–45.3)
VENTILATOR MODE: ABNORMAL
VT ON VENT VENT: 500 ML
VT ON VENT VENT: 500 ML
VT ON VENT VENT: ABNORMAL ML
WBC MORPH BLD: NORMAL
WBC NRBC COR # BLD AUTO: 11.59 10*3/MM3 (ref 3.4–10.8)
WBC NRBC COR # BLD AUTO: 8.75 10*3/MM3 (ref 3.4–10.8)
WBC NRBC COR # BLD AUTO: 9.19 10*3/MM3 (ref 3.4–10.8)

## 2025-06-16 PROCEDURE — 84132 ASSAY OF SERUM POTASSIUM: CPT

## 2025-06-16 PROCEDURE — 25010000002 BUPIVACAINE LIPOSOME 1.3 % SUSPENSION 20 ML VIAL: Performed by: SURGERY

## 2025-06-16 PROCEDURE — 25010000002 FUROSEMIDE PER 20 MG

## 2025-06-16 PROCEDURE — P9100 PATHOGEN TEST FOR PLATELETS: HCPCS

## 2025-06-16 PROCEDURE — 02L70CK OCCLUSION OF LEFT ATRIAL APPENDAGE WITH EXTRALUMINAL DEVICE, OPEN APPROACH: ICD-10-PCS | Performed by: SURGERY

## 2025-06-16 PROCEDURE — 25010000002 LIDOCAINE PF 2% 2 % SOLUTION: Performed by: NURSE ANESTHETIST, CERTIFIED REGISTERED

## 2025-06-16 PROCEDURE — 25010000002 VANCOMYCIN 1 G RECONSTITUTED SOLUTION 1 EACH VIAL: Performed by: SURGERY

## 2025-06-16 PROCEDURE — P9047 ALBUMIN (HUMAN), 25%, 50ML: HCPCS

## 2025-06-16 PROCEDURE — 25010000002 HEPARIN (PORCINE) PER 1000 UNITS: Performed by: NURSE ANESTHETIST, CERTIFIED REGISTERED

## 2025-06-16 PROCEDURE — 82948 REAGENT STRIP/BLOOD GLUCOSE: CPT

## 2025-06-16 PROCEDURE — 85384 FIBRINOGEN ACTIVITY: CPT | Performed by: SURGERY

## 2025-06-16 PROCEDURE — B246ZZ4 ULTRASONOGRAPHY OF RIGHT AND LEFT HEART, TRANSESOPHAGEAL: ICD-10-PCS | Performed by: ANESTHESIOLOGY

## 2025-06-16 PROCEDURE — 88311 DECALCIFY TISSUE: CPT | Performed by: SURGERY

## 2025-06-16 PROCEDURE — 25010000002 PROTAMINE SULFATE PER 10 MG: Performed by: NURSE ANESTHETIST, CERTIFIED REGISTERED

## 2025-06-16 PROCEDURE — 33257 ABLATE ATRIA LMTD ADD-ON: CPT | Performed by: SURGERY

## 2025-06-16 PROCEDURE — S0260 H&P FOR SURGERY: HCPCS | Performed by: SURGERY

## 2025-06-16 PROCEDURE — 85018 HEMOGLOBIN: CPT

## 2025-06-16 PROCEDURE — 25010000002 MANNITOL PER 50 ML

## 2025-06-16 PROCEDURE — 80069 RENAL FUNCTION PANEL: CPT | Performed by: SURGERY

## 2025-06-16 PROCEDURE — 85730 THROMBOPLASTIN TIME PARTIAL: CPT | Performed by: SURGERY

## 2025-06-16 PROCEDURE — 94799 UNLISTED PULMONARY SVC/PX: CPT

## 2025-06-16 PROCEDURE — 86920 COMPATIBILITY TEST SPIN: CPT

## 2025-06-16 PROCEDURE — A4648 IMPLANTABLE TISSUE MARKER: HCPCS | Performed by: SURGERY

## 2025-06-16 PROCEDURE — C1713 ANCHOR/SCREW BN/BN,TIS/BN: HCPCS | Performed by: SURGERY

## 2025-06-16 PROCEDURE — P9041 ALBUMIN (HUMAN),5%, 50ML: HCPCS | Performed by: SURGERY

## 2025-06-16 PROCEDURE — 25010000002 CEFAZOLIN PER 500 MG: Performed by: NURSE PRACTITIONER

## 2025-06-16 PROCEDURE — 82805 BLOOD GASES W/O2 SATURATION: CPT

## 2025-06-16 PROCEDURE — 33405 REPLACEMENT AORTIC VALVE OPN: CPT | Performed by: SURGERY

## 2025-06-16 PROCEDURE — 02100Z9 BYPASS CORONARY ARTERY, ONE ARTERY FROM LEFT INTERNAL MAMMARY, OPEN APPROACH: ICD-10-PCS | Performed by: SURGERY

## 2025-06-16 PROCEDURE — 25010000002 MORPHINE PER 10 MG: Performed by: SURGERY

## 2025-06-16 PROCEDURE — 82330 ASSAY OF CALCIUM: CPT

## 2025-06-16 PROCEDURE — 71045 X-RAY EXAM CHEST 1 VIEW: CPT

## 2025-06-16 PROCEDURE — 85027 COMPLETE CBC AUTOMATED: CPT | Performed by: SURGERY

## 2025-06-16 PROCEDURE — 25010000002 PAPAVERINE PER 60 MG: Performed by: SURGERY

## 2025-06-16 PROCEDURE — 36430 TRANSFUSION BLD/BLD COMPNT: CPT

## 2025-06-16 PROCEDURE — 25810000003 SODIUM CHLORIDE 0.9 % SOLUTION 250 ML FLEX CONT: Performed by: NURSE ANESTHETIST, CERTIFIED REGISTERED

## 2025-06-16 PROCEDURE — 06BP4ZZ EXCISION OF RIGHT SAPHENOUS VEIN, PERCUTANEOUS ENDOSCOPIC APPROACH: ICD-10-PCS | Performed by: SURGERY

## 2025-06-16 PROCEDURE — 94761 N-INVAS EAR/PLS OXIMETRY MLT: CPT

## 2025-06-16 PROCEDURE — 02580ZZ DESTRUCTION OF CONDUCTION MECHANISM, OPEN APPROACH: ICD-10-PCS | Performed by: SURGERY

## 2025-06-16 PROCEDURE — 85025 COMPLETE CBC W/AUTO DIFF WBC: CPT | Performed by: SURGERY

## 2025-06-16 PROCEDURE — 86900 BLOOD TYPING SEROLOGIC ABO: CPT

## 2025-06-16 PROCEDURE — 36600 WITHDRAWAL OF ARTERIAL BLOOD: CPT

## 2025-06-16 PROCEDURE — 25010000002 MIDAZOLAM HCL (PF) 5 MG/5ML SOLUTION: Performed by: NURSE ANESTHETIST, CERTIFIED REGISTERED

## 2025-06-16 PROCEDURE — 25010000002 ALBUMIN HUMAN 25% PER 50 ML

## 2025-06-16 PROCEDURE — 94640 AIRWAY INHALATION TREATMENT: CPT

## 2025-06-16 PROCEDURE — 25010000002 NICARDIPINE IN NACL 20-0.86 MG/200ML-% SOLUTION: Performed by: SURGERY

## 2025-06-16 PROCEDURE — 25010000002 ACETAMINOPHEN 10 MG/ML SOLUTION: Performed by: SURGERY

## 2025-06-16 PROCEDURE — 25010000002 PROPOFOL 10 MG/ML EMULSION: Performed by: NURSE ANESTHETIST, CERTIFIED REGISTERED

## 2025-06-16 PROCEDURE — 88305 TISSUE EXAM BY PATHOLOGIST: CPT | Performed by: SURGERY

## 2025-06-16 PROCEDURE — 84295 ASSAY OF SERUM SODIUM: CPT

## 2025-06-16 PROCEDURE — 93005 ELECTROCARDIOGRAM TRACING: CPT | Performed by: SURGERY

## 2025-06-16 PROCEDURE — 25010000002 LIDOCAINE (CARDIAC)

## 2025-06-16 PROCEDURE — 25010000002 NICARDIPINE 2.5 MG/ML SOLUTION: Performed by: NURSE ANESTHETIST, CERTIFIED REGISTERED

## 2025-06-16 PROCEDURE — 82803 BLOOD GASES ANY COMBINATION: CPT

## 2025-06-16 PROCEDURE — 82947 ASSAY GLUCOSE BLOOD QUANT: CPT

## 2025-06-16 PROCEDURE — 021009W BYPASS CORONARY ARTERY, ONE ARTERY FROM AORTA WITH AUTOLOGOUS VENOUS TISSUE, OPEN APPROACH: ICD-10-PCS | Performed by: SURGERY

## 2025-06-16 PROCEDURE — C1889 IMPLANT/INSERT DEVICE, NOC: HCPCS | Performed by: SURGERY

## 2025-06-16 PROCEDURE — 25010000002 MIDAZOLAM PER 1MG

## 2025-06-16 PROCEDURE — 93010 ELECTROCARDIOGRAM REPORT: CPT | Performed by: INTERNAL MEDICINE

## 2025-06-16 PROCEDURE — 83050 HGB METHEMOGLOBIN QUAN: CPT

## 2025-06-16 PROCEDURE — 25010000002 VANCOMYCIN 1.25-0.9 GM/250ML-% SOLUTION: Performed by: SURGERY

## 2025-06-16 PROCEDURE — 33533 CABG ARTERIAL SINGLE: CPT | Performed by: SURGERY

## 2025-06-16 PROCEDURE — 25010000002 HEPARIN (PORCINE) PER 1000 UNITS

## 2025-06-16 PROCEDURE — 25010000002 PHENYLEPHRINE 10 MG/ML SOLUTION

## 2025-06-16 PROCEDURE — C1769 GUIDE WIRE: HCPCS | Performed by: SURGERY

## 2025-06-16 PROCEDURE — 33517 CABG ARTERY-VEIN SINGLE: CPT | Performed by: SURGERY

## 2025-06-16 PROCEDURE — 02RF08Z REPLACEMENT OF AORTIC VALVE WITH ZOOPLASTIC TISSUE, OPEN APPROACH: ICD-10-PCS | Performed by: SURGERY

## 2025-06-16 PROCEDURE — 5A1221Z PERFORMANCE OF CARDIAC OUTPUT, CONTINUOUS: ICD-10-PCS | Performed by: SURGERY

## 2025-06-16 PROCEDURE — 85610 PROTHROMBIN TIME: CPT | Performed by: SURGERY

## 2025-06-16 PROCEDURE — 25010000002 POTASSIUM CHLORIDE PER 2 MEQ OF POTASSIUM

## 2025-06-16 PROCEDURE — 25010000002 HEPARIN (PORCINE) PER 1000 UNITS: Performed by: SURGERY

## 2025-06-16 PROCEDURE — 25010000002 SUGAMMADEX 200 MG/2ML SOLUTION: Performed by: NURSE ANESTHETIST, CERTIFIED REGISTERED

## 2025-06-16 PROCEDURE — 86901 BLOOD TYPING SEROLOGIC RH(D): CPT

## 2025-06-16 PROCEDURE — 25010000002 VANCOMYCIN 1.75-0.9 GM/500ML-% SOLUTION: Performed by: NURSE ANESTHETIST, CERTIFIED REGISTERED

## 2025-06-16 PROCEDURE — 25010000002 AMINOCAPROIC ACID PER 5 G: Performed by: NURSE ANESTHETIST, CERTIFIED REGISTERED

## 2025-06-16 PROCEDURE — 85007 BL SMEAR W/DIFF WBC COUNT: CPT | Performed by: SURGERY

## 2025-06-16 PROCEDURE — 94002 VENT MGMT INPAT INIT DAY: CPT

## 2025-06-16 PROCEDURE — 82375 ASSAY CARBOXYHB QUANT: CPT

## 2025-06-16 PROCEDURE — P9035 PLATELET PHERES LEUKOREDUCED: HCPCS

## 2025-06-16 PROCEDURE — 25810000003 SODIUM CHLORIDE 0.9 % SOLUTION

## 2025-06-16 PROCEDURE — 25010000002 ALBUMIN HUMAN 5% PER 50 ML: Performed by: SURGERY

## 2025-06-16 PROCEDURE — 25010000002 PROTAMINE SULFATE PER 10 MG

## 2025-06-16 PROCEDURE — 33508 ENDOSCOPIC VEIN HARVEST: CPT | Performed by: SURGERY

## 2025-06-16 PROCEDURE — 83605 ASSAY OF LACTIC ACID: CPT

## 2025-06-16 PROCEDURE — 25810000003 LACTATED RINGERS PER 1000 ML: Performed by: SURGERY

## 2025-06-16 PROCEDURE — C1760 CLOSURE DEV, VASC: HCPCS | Performed by: SURGERY

## 2025-06-16 PROCEDURE — C2618 PROBE/NEEDLE, CRYO: HCPCS | Performed by: SURGERY

## 2025-06-16 DEVICE — APPL CLIP LAA ATRICLIP FLX 40MM: Type: IMPLANTABLE DEVICE | Site: HEART | Status: FUNCTIONAL

## 2025-06-16 DEVICE — COR-KNOT MINI® COMBO KITBASE PACKAGE TYPE - KITEACH STERILE PACKAGE KIT CONTAINS (2) SINGLE PATIENT USE COR-KNOT MINI® DEVICES AND (12) COR-KNOT® QUICK LOADS®.
Type: IMPLANTABLE DEVICE | Site: HEART | Status: FUNCTIONAL
Brand: COR-KNOT MINI®

## 2025-06-16 DEVICE — ABSORBABLE HEMOSTAT (OXIDIZED REGENERATED CELLULOSE)
Type: IMPLANTABLE DEVICE | Site: CHEST | Status: FUNCTIONAL
Brand: SURGICEL NU-KNIT

## 2025-06-16 DEVICE — WAX,BONE,NATURAL
Type: IMPLANTABLE DEVICE | Site: CHEST | Status: FUNCTIONAL
Brand: MEDLINE INDUSTRIES

## 2025-06-16 DEVICE — SS SUTURE, 3 PER SLEEVE
Type: IMPLANTABLE DEVICE | Site: CHEST | Status: FUNCTIONAL
Brand: MYO/WIRE II

## 2025-06-16 DEVICE — PLEDGET INCISIONLINE REINF TFE SFT PTFE 1.5X3X7MM WHT: Type: IMPLANTABLE DEVICE | Site: CHEST | Status: FUNCTIONAL

## 2025-06-16 DEVICE — KT HEMOST ABS SURGIFOAM PORCN 1GRAM: Type: IMPLANTABLE DEVICE | Site: CHEST | Status: FUNCTIONAL

## 2025-06-16 DEVICE — DISK-SHAPED STYLE, SILICONE (1 PER STERILE PKG)
Type: IMPLANTABLE DEVICE | Site: HEART | Status: FUNCTIONAL
Brand: SCANLAN® RADIOMARK® GRAFT MARKERS

## 2025-06-16 DEVICE — COR-KNOT® QUICK LOAD® SINGLES
Type: IMPLANTABLE DEVICE | Site: HEART | Status: FUNCTIONAL
Brand: COR-KNOT® QUICK LOAD®

## 2025-06-16 DEVICE — ADHS SURG BIOGLUE PREF 5ML: Type: IMPLANTABLE DEVICE | Site: HEART | Status: FUNCTIONAL

## 2025-06-16 DEVICE — VLV AORTA INSPRIS RESILIA 23MM: Type: IMPLANTABLE DEVICE | Site: HEART | Status: FUNCTIONAL

## 2025-06-16 RX ORDER — OXYCODONE HYDROCHLORIDE 10 MG/1
10 TABLET ORAL EVERY 4 HOURS PRN
Status: DISCONTINUED | OUTPATIENT
Start: 2025-06-16 | End: 2025-06-18

## 2025-06-16 RX ORDER — LIDOCAINE HYDROCHLORIDE 10 MG/ML
0.5 INJECTION, SOLUTION EPIDURAL; INFILTRATION; INTRACAUDAL; PERINEURAL ONCE AS NEEDED
Status: DISCONTINUED | OUTPATIENT
Start: 2025-06-16 | End: 2025-06-16 | Stop reason: HOSPADM

## 2025-06-16 RX ORDER — DEXMEDETOMIDINE HYDROCHLORIDE 4 UG/ML
.2-1.5 INJECTION, SOLUTION INTRAVENOUS
Status: DISCONTINUED | OUTPATIENT
Start: 2025-06-16 | End: 2025-06-16

## 2025-06-16 RX ORDER — ALBUTEROL SULFATE 0.83 MG/ML
2.5 SOLUTION RESPIRATORY (INHALATION) EVERY 4 HOURS PRN
Status: ACTIVE | OUTPATIENT
Start: 2025-06-16 | End: 2025-06-17

## 2025-06-16 RX ORDER — SODIUM CHLORIDE 0.9 % (FLUSH) 0.9 %
30 SYRINGE (ML) INJECTION ONCE AS NEEDED
Status: DISCONTINUED | OUTPATIENT
Start: 2025-06-16 | End: 2025-06-16 | Stop reason: HOSPADM

## 2025-06-16 RX ORDER — MEPERIDINE HYDROCHLORIDE 50 MG/ML
25 INJECTION INTRAMUSCULAR; INTRAVENOUS; SUBCUTANEOUS
Status: DISCONTINUED | OUTPATIENT
Start: 2025-06-16 | End: 2025-06-17

## 2025-06-16 RX ORDER — SODIUM CHLORIDE 0.9 % (FLUSH) 0.9 %
3 SYRINGE (ML) INJECTION EVERY 12 HOURS SCHEDULED
Status: DISCONTINUED | OUTPATIENT
Start: 2025-06-16 | End: 2025-06-16 | Stop reason: HOSPADM

## 2025-06-16 RX ORDER — ACETAMINOPHEN 650 MG/1
650 SUPPOSITORY RECTAL EVERY 4 HOURS PRN
Status: DISCONTINUED | OUTPATIENT
Start: 2025-06-17 | End: 2025-06-16

## 2025-06-16 RX ORDER — ENOXAPARIN SODIUM 100 MG/ML
40 INJECTION SUBCUTANEOUS DAILY
Status: DISCONTINUED | OUTPATIENT
Start: 2025-06-17 | End: 2025-06-29 | Stop reason: HOSPADM

## 2025-06-16 RX ORDER — CHLORHEXIDINE GLUCONATE 500 MG/1
1 CLOTH TOPICAL EVERY 24 HOURS
Status: DISCONTINUED | OUTPATIENT
Start: 2025-06-17 | End: 2025-06-20

## 2025-06-16 RX ORDER — ONDANSETRON 2 MG/ML
4 INJECTION INTRAMUSCULAR; INTRAVENOUS EVERY 6 HOURS PRN
Status: DISCONTINUED | OUTPATIENT
Start: 2025-06-16 | End: 2025-06-29 | Stop reason: HOSPADM

## 2025-06-16 RX ORDER — DEXMEDETOMIDINE HYDROCHLORIDE 4 UG/ML
INJECTION, SOLUTION INTRAVENOUS CONTINUOUS PRN
Status: DISCONTINUED | OUTPATIENT
Start: 2025-06-16 | End: 2025-06-16 | Stop reason: SURG

## 2025-06-16 RX ORDER — SODIUM CHLORIDE 0.9 % (FLUSH) 0.9 %
10 SYRINGE (ML) INJECTION AS NEEDED
Status: DISCONTINUED | OUTPATIENT
Start: 2025-06-16 | End: 2025-06-16 | Stop reason: HOSPADM

## 2025-06-16 RX ORDER — MORPHINE SULFATE 2 MG/ML
2 INJECTION, SOLUTION INTRAMUSCULAR; INTRAVENOUS
Status: DISCONTINUED | OUTPATIENT
Start: 2025-06-16 | End: 2025-06-17

## 2025-06-16 RX ORDER — CALCIUM CHLORIDE 100 MG/ML
INJECTION INTRAVENOUS; INTRAVENTRICULAR
Status: COMPLETED
Start: 2025-06-16 | End: 2025-06-16

## 2025-06-16 RX ORDER — METOPROLOL TARTRATE 25 MG/1
12.5 TABLET, FILM COATED ORAL EVERY 12 HOURS SCHEDULED
Status: DISCONTINUED | OUTPATIENT
Start: 2025-06-17 | End: 2025-06-18

## 2025-06-16 RX ORDER — SODIUM CHLORIDE 9 MG/ML
30 INJECTION, SOLUTION INTRAVENOUS CONTINUOUS PRN
Status: DISCONTINUED | OUTPATIENT
Start: 2025-06-16 | End: 2025-06-16 | Stop reason: HOSPADM

## 2025-06-16 RX ORDER — ALBUMIN HUMAN 50 G/1000ML
500 SOLUTION INTRAVENOUS AS NEEDED
Status: DISCONTINUED | OUTPATIENT
Start: 2025-06-16 | End: 2025-06-18

## 2025-06-16 RX ORDER — SODIUM CHLORIDE, SODIUM LACTATE, POTASSIUM CHLORIDE, CALCIUM CHLORIDE 600; 310; 30; 20 MG/100ML; MG/100ML; MG/100ML; MG/100ML
1000 INJECTION, SOLUTION INTRAVENOUS CONTINUOUS
Status: DISCONTINUED | OUTPATIENT
Start: 2025-06-16 | End: 2025-06-16

## 2025-06-16 RX ORDER — METHOCARBAMOL 500 MG/1
500 TABLET, FILM COATED ORAL EVERY 8 HOURS SCHEDULED
Status: DISCONTINUED | OUTPATIENT
Start: 2025-06-16 | End: 2025-06-18

## 2025-06-16 RX ORDER — HEPARIN SODIUM 1000 [USP'U]/ML
INJECTION, SOLUTION INTRAVENOUS; SUBCUTANEOUS AS NEEDED
Status: DISCONTINUED | OUTPATIENT
Start: 2025-06-16 | End: 2025-06-16 | Stop reason: SURG

## 2025-06-16 RX ORDER — DEXTROSE MONOHYDRATE 25 G/50ML
10-50 INJECTION, SOLUTION INTRAVENOUS
Status: DISCONTINUED | OUTPATIENT
Start: 2025-06-16 | End: 2025-06-16 | Stop reason: HOSPADM

## 2025-06-16 RX ORDER — LIDOCAINE HYDROCHLORIDE 20 MG/ML
INJECTION, SOLUTION EPIDURAL; INFILTRATION; INTRACAUDAL; PERINEURAL AS NEEDED
Status: DISCONTINUED | OUTPATIENT
Start: 2025-06-16 | End: 2025-06-16 | Stop reason: SURG

## 2025-06-16 RX ORDER — ATORVASTATIN CALCIUM 10 MG/1
20 TABLET, FILM COATED ORAL NIGHTLY
Status: DISCONTINUED | OUTPATIENT
Start: 2025-06-17 | End: 2025-06-29 | Stop reason: HOSPADM

## 2025-06-16 RX ORDER — CLOPIDOGREL BISULFATE 75 MG/1
75 TABLET ORAL DAILY
Status: DISCONTINUED | OUTPATIENT
Start: 2025-06-17 | End: 2025-06-23

## 2025-06-16 RX ORDER — CITALOPRAM HYDROBROMIDE 20 MG/1
5 TABLET ORAL DAILY
Status: DISCONTINUED | OUTPATIENT
Start: 2025-06-17 | End: 2025-06-20

## 2025-06-16 RX ORDER — AMINOCAPROIC ACID 250 MG/ML
INJECTION, SOLUTION INTRAVENOUS AS NEEDED
Status: DISCONTINUED | OUTPATIENT
Start: 2025-06-16 | End: 2025-06-16 | Stop reason: SURG

## 2025-06-16 RX ORDER — CALCIUM CHLORIDE 100 MG/ML
1 INJECTION INTRAVENOUS; INTRAVENTRICULAR ONCE
Status: COMPLETED | OUTPATIENT
Start: 2025-06-16 | End: 2025-06-16

## 2025-06-16 RX ORDER — NICOTINE POLACRILEX 4 MG
15 LOZENGE BUCCAL
Status: DISCONTINUED | OUTPATIENT
Start: 2025-06-16 | End: 2025-06-16 | Stop reason: HOSPADM

## 2025-06-16 RX ORDER — ALBUMIN HUMAN 50 G/1000ML
500 SOLUTION INTRAVENOUS ONCE
Status: COMPLETED | OUTPATIENT
Start: 2025-06-16 | End: 2025-06-16

## 2025-06-16 RX ORDER — NOREPINEPHRINE BITARTRATE 0.03 MG/ML
.02-.3 INJECTION, SOLUTION INTRAVENOUS
Status: DISCONTINUED | OUTPATIENT
Start: 2025-06-16 | End: 2025-06-16 | Stop reason: SDUPTHER

## 2025-06-16 RX ORDER — IBUPROFEN 600 MG/1
1 TABLET ORAL
Status: DISCONTINUED | OUTPATIENT
Start: 2025-06-16 | End: 2025-06-17

## 2025-06-16 RX ORDER — PROPOFOL 10 MG/ML
VIAL (ML) INTRAVENOUS AS NEEDED
Status: DISCONTINUED | OUTPATIENT
Start: 2025-06-16 | End: 2025-06-16 | Stop reason: SURG

## 2025-06-16 RX ORDER — IPRATROPIUM BROMIDE AND ALBUTEROL SULFATE 2.5; .5 MG/3ML; MG/3ML
3 SOLUTION RESPIRATORY (INHALATION)
Status: DISCONTINUED | OUTPATIENT
Start: 2025-06-16 | End: 2025-06-17

## 2025-06-16 RX ORDER — ACETAMINOPHEN 160 MG/5ML
650 SOLUTION ORAL EVERY 4 HOURS PRN
Status: DISCONTINUED | OUTPATIENT
Start: 2025-06-17 | End: 2025-06-16

## 2025-06-16 RX ORDER — PROTAMINE SULFATE 10 MG/ML
INJECTION, SOLUTION INTRAVENOUS AS NEEDED
Status: DISCONTINUED | OUTPATIENT
Start: 2025-06-16 | End: 2025-06-16 | Stop reason: SURG

## 2025-06-16 RX ORDER — NICARDIPINE HYDROCHLORIDE 2.5 MG/ML
INJECTION INTRAVENOUS AS NEEDED
Status: DISCONTINUED | OUTPATIENT
Start: 2025-06-16 | End: 2025-06-16 | Stop reason: SURG

## 2025-06-16 RX ORDER — MIDAZOLAM HYDROCHLORIDE 2 MG/2ML
0.5 INJECTION, SOLUTION INTRAMUSCULAR; INTRAVENOUS
Status: DISCONTINUED | OUTPATIENT
Start: 2025-06-16 | End: 2025-06-16 | Stop reason: HOSPADM

## 2025-06-16 RX ORDER — SODIUM CHLORIDE 0.9 % (FLUSH) 0.9 %
3-10 SYRINGE (ML) INJECTION AS NEEDED
Status: DISCONTINUED | OUTPATIENT
Start: 2025-06-16 | End: 2025-06-16 | Stop reason: HOSPADM

## 2025-06-16 RX ORDER — BISACODYL 5 MG/1
10 TABLET, DELAYED RELEASE ORAL 2 TIMES DAILY
Status: DISCONTINUED | OUTPATIENT
Start: 2025-06-17 | End: 2025-06-29 | Stop reason: HOSPADM

## 2025-06-16 RX ORDER — DEXTROSE MONOHYDRATE, SODIUM CHLORIDE, AND POTASSIUM CHLORIDE 50; 1.49; 4.5 G/1000ML; G/1000ML; G/1000ML
60 INJECTION, SOLUTION INTRAVENOUS CONTINUOUS
Status: DISCONTINUED | OUTPATIENT
Start: 2025-06-16 | End: 2025-06-18

## 2025-06-16 RX ORDER — NICOTINE POLACRILEX 4 MG
15 LOZENGE BUCCAL
Status: DISCONTINUED | OUTPATIENT
Start: 2025-06-16 | End: 2025-06-17

## 2025-06-16 RX ORDER — ACETAMINOPHEN 10 MG/ML
1000 INJECTION, SOLUTION INTRAVENOUS EVERY 8 HOURS
Status: COMPLETED | OUTPATIENT
Start: 2025-06-16 | End: 2025-06-16

## 2025-06-16 RX ORDER — MAGNESIUM HYDROXIDE 1200 MG/15ML
LIQUID ORAL AS NEEDED
Status: DISCONTINUED | OUTPATIENT
Start: 2025-06-16 | End: 2025-06-16 | Stop reason: HOSPADM

## 2025-06-16 RX ORDER — SUFENTANIL CITRATE 50 UG/ML
INJECTION EPIDURAL; INTRAVENOUS AS NEEDED
Status: DISCONTINUED | OUTPATIENT
Start: 2025-06-16 | End: 2025-06-16 | Stop reason: SURG

## 2025-06-16 RX ORDER — VANCOMYCIN 1.75 GRAM/500 ML IN 0.9 % SODIUM CHLORIDE INTRAVENOUS
AS NEEDED
Status: DISCONTINUED | OUTPATIENT
Start: 2025-06-16 | End: 2025-06-16 | Stop reason: SURG

## 2025-06-16 RX ORDER — CHLORHEXIDINE GLUCONATE 500 MG/1
1 CLOTH TOPICAL ONCE
Status: COMPLETED | OUTPATIENT
Start: 2025-06-16 | End: 2025-06-16

## 2025-06-16 RX ORDER — ASPIRIN 81 MG/1
81 TABLET ORAL DAILY
Status: DISCONTINUED | OUTPATIENT
Start: 2025-06-18 | End: 2025-06-29 | Stop reason: HOSPADM

## 2025-06-16 RX ORDER — VANCOMYCIN HYDROCHLORIDE
1250 EVERY 12 HOURS
Status: DISCONTINUED | OUTPATIENT
Start: 2025-06-16 | End: 2025-06-17

## 2025-06-16 RX ORDER — VECURONIUM BROMIDE 1 MG/ML
INJECTION, POWDER, LYOPHILIZED, FOR SOLUTION INTRAVENOUS AS NEEDED
Status: DISCONTINUED | OUTPATIENT
Start: 2025-06-16 | End: 2025-06-16 | Stop reason: SURG

## 2025-06-16 RX ORDER — SODIUM CHLORIDE 9 MG/ML
40 INJECTION, SOLUTION INTRAVENOUS AS NEEDED
Status: DISCONTINUED | OUTPATIENT
Start: 2025-06-16 | End: 2025-06-16 | Stop reason: HOSPADM

## 2025-06-16 RX ORDER — MIDAZOLAM HYDROCHLORIDE 2 MG/2ML
2 INJECTION, SOLUTION INTRAMUSCULAR; INTRAVENOUS ONCE
Status: COMPLETED | OUTPATIENT
Start: 2025-06-16 | End: 2025-06-16

## 2025-06-16 RX ORDER — OXYCODONE HYDROCHLORIDE 5 MG/1
5 TABLET ORAL EVERY 4 HOURS PRN
Status: DISPENSED | OUTPATIENT
Start: 2025-06-16 | End: 2025-06-23

## 2025-06-16 RX ORDER — MIDAZOLAM HYDROCHLORIDE 2 MG/2ML
INJECTION, SOLUTION INTRAMUSCULAR; INTRAVENOUS
Status: COMPLETED
Start: 2025-06-16 | End: 2025-06-16

## 2025-06-16 RX ORDER — ACETAMINOPHEN 500 MG
1000 TABLET ORAL ONCE
Status: COMPLETED | OUTPATIENT
Start: 2025-06-16 | End: 2025-06-16

## 2025-06-16 RX ORDER — MIDAZOLAM HYDROCHLORIDE 5 MG/5ML
INJECTION, SOLUTION INTRAMUSCULAR; INTRAVENOUS AS NEEDED
Status: DISCONTINUED | OUTPATIENT
Start: 2025-06-16 | End: 2025-06-16 | Stop reason: SURG

## 2025-06-16 RX ORDER — NOREPINEPHRINE BITARTRATE 0.03 MG/ML
.02-.3 INJECTION, SOLUTION INTRAVENOUS CONTINUOUS PRN
Status: DISCONTINUED | OUTPATIENT
Start: 2025-06-16 | End: 2025-06-18

## 2025-06-16 RX ORDER — ACETAMINOPHEN 325 MG/1
650 TABLET ORAL EVERY 6 HOURS SCHEDULED
Status: DISCONTINUED | OUTPATIENT
Start: 2025-06-17 | End: 2025-06-29 | Stop reason: HOSPADM

## 2025-06-16 RX ORDER — POLYETHYLENE GLYCOL 3350 17 G/17G
17 POWDER, FOR SOLUTION ORAL DAILY
Status: DISCONTINUED | OUTPATIENT
Start: 2025-06-17 | End: 2025-06-29 | Stop reason: HOSPADM

## 2025-06-16 RX ORDER — IBUPROFEN 600 MG/1
1 TABLET ORAL
Status: DISCONTINUED | OUTPATIENT
Start: 2025-06-16 | End: 2025-06-16 | Stop reason: HOSPADM

## 2025-06-16 RX ORDER — METOPROLOL TARTRATE 1 MG/ML
INJECTION, SOLUTION INTRAVENOUS AS NEEDED
Status: DISCONTINUED | OUTPATIENT
Start: 2025-06-16 | End: 2025-06-16 | Stop reason: SURG

## 2025-06-16 RX ORDER — DEXTROSE MONOHYDRATE 25 G/50ML
10-50 INJECTION, SOLUTION INTRAVENOUS
Status: DISCONTINUED | OUTPATIENT
Start: 2025-06-16 | End: 2025-06-17

## 2025-06-16 RX ORDER — SODIUM CHLORIDE, SODIUM LACTATE, POTASSIUM CHLORIDE, CALCIUM CHLORIDE 600; 310; 30; 20 MG/100ML; MG/100ML; MG/100ML; MG/100ML
100 INJECTION, SOLUTION INTRAVENOUS CONTINUOUS
Status: DISCONTINUED | OUTPATIENT
Start: 2025-06-16 | End: 2025-06-16

## 2025-06-16 RX ORDER — ACETAMINOPHEN 325 MG/1
650 TABLET ORAL EVERY 4 HOURS PRN
Status: DISCONTINUED | OUTPATIENT
Start: 2025-06-17 | End: 2025-06-16

## 2025-06-16 RX ORDER — ASPIRIN 81 MG/1
162 TABLET, CHEWABLE ORAL ONCE
Status: COMPLETED | OUTPATIENT
Start: 2025-06-17 | End: 2025-06-17

## 2025-06-16 RX ORDER — SODIUM CHLORIDE 0.9 % (FLUSH) 0.9 %
10 SYRINGE (ML) INJECTION EVERY 12 HOURS SCHEDULED
Status: DISCONTINUED | OUTPATIENT
Start: 2025-06-16 | End: 2025-06-16 | Stop reason: HOSPADM

## 2025-06-16 RX ORDER — SODIUM CHLORIDE 0.9 % (FLUSH) 0.9 %
3 SYRINGE (ML) INJECTION AS NEEDED
Status: DISCONTINUED | OUTPATIENT
Start: 2025-06-16 | End: 2025-06-16 | Stop reason: HOSPADM

## 2025-06-16 RX ORDER — AMIODARONE HYDROCHLORIDE 200 MG/1
200 TABLET ORAL DAILY
Status: DISCONTINUED | OUTPATIENT
Start: 2025-06-17 | End: 2025-06-20

## 2025-06-16 RX ORDER — DEXMEDETOMIDINE HYDROCHLORIDE 4 UG/ML
.2-1.5 INJECTION, SOLUTION INTRAVENOUS
Status: DISCONTINUED | OUTPATIENT
Start: 2025-06-16 | End: 2025-06-17

## 2025-06-16 RX ADMIN — PROPOFOL 50 MG: 10 INJECTION, EMULSION INTRAVENOUS at 07:13

## 2025-06-16 RX ADMIN — MIDAZOLAM HYDROCHLORIDE 2 MG: 1 INJECTION, SOLUTION INTRAMUSCULAR; INTRAVENOUS at 06:32

## 2025-06-16 RX ADMIN — NOREPINEPHRINE BITARTRATE 8 MCG: 1 INJECTION, SOLUTION, CONCENTRATE INTRAVENOUS at 07:57

## 2025-06-16 RX ADMIN — NICARDIPINE HYDROCHLORIDE 1000 MCG: 2.5 INJECTION INTRAVENOUS at 11:32

## 2025-06-16 RX ADMIN — SUFENTANIL CITRATE 100 MCG: 50 INJECTION, SOLUTION EPIDURAL; INTRAVENOUS at 07:13

## 2025-06-16 RX ADMIN — CALCIUM CHLORIDE 1 G: 100 INJECTION INTRAVENOUS; INTRAVENTRICULAR at 14:51

## 2025-06-16 RX ADMIN — NICARDIPINE HYDROCHLORIDE 2.5 MG/HR: 0.1 INJECTION, SOLUTION INTRAVENOUS at 13:24

## 2025-06-16 RX ADMIN — IPRATROPIUM BROMIDE AND ALBUTEROL SULFATE 3 ML: 2.5; .5 SOLUTION RESPIRATORY (INHALATION) at 18:04

## 2025-06-16 RX ADMIN — SUGAMMADEX 200 MG: 100 INJECTION, SOLUTION INTRAVENOUS at 13:09

## 2025-06-16 RX ADMIN — SUFENTANIL CITRATE 50 MCG: 50 INJECTION, SOLUTION EPIDURAL; INTRAVENOUS at 11:13

## 2025-06-16 RX ADMIN — INSULIN HUMAN 2.1 UNITS/HR: 1 INJECTION, SOLUTION INTRAVENOUS at 16:44

## 2025-06-16 RX ADMIN — NOREPINEPHRINE BITARTRATE 8 MCG: 1 INJECTION, SOLUTION, CONCENTRATE INTRAVENOUS at 07:23

## 2025-06-16 RX ADMIN — ACETAMINOPHEN 1000 MG: 10 INJECTION, SOLUTION INTRAVENOUS at 14:06

## 2025-06-16 RX ADMIN — OXYCODONE HYDROCHLORIDE 10 MG: 10 TABLET ORAL at 22:35

## 2025-06-16 RX ADMIN — Medication 1 APPLICATION: at 05:57

## 2025-06-16 RX ADMIN — VECURONIUM BROMIDE 10 MG: 1 INJECTION, POWDER, LYOPHILIZED, FOR SOLUTION INTRAVENOUS at 08:48

## 2025-06-16 RX ADMIN — SODIUM CHLORIDE, POTASSIUM CHLORIDE, SODIUM LACTATE AND CALCIUM CHLORIDE: 600; 310; 30; 20 INJECTION, SOLUTION INTRAVENOUS at 10:51

## 2025-06-16 RX ADMIN — ALBUMIN (HUMAN) 500 ML: 12.5 INJECTION, SOLUTION INTRAVENOUS at 13:39

## 2025-06-16 RX ADMIN — VECURONIUM BROMIDE 7 MG: 1 INJECTION, POWDER, LYOPHILIZED, FOR SOLUTION INTRAVENOUS at 07:13

## 2025-06-16 RX ADMIN — CHLORHEXIDINE GLUCONATE 1 APPLICATION: 500 CLOTH TOPICAL at 14:06

## 2025-06-16 RX ADMIN — MIDAZOLAM HYDROCHLORIDE 5 MG: 1 INJECTION, SOLUTION INTRAMUSCULAR; INTRAVENOUS at 07:11

## 2025-06-16 RX ADMIN — NOREPINEPHRINE BITARTRATE 8 MCG: 1 INJECTION, SOLUTION, CONCENTRATE INTRAVENOUS at 07:40

## 2025-06-16 RX ADMIN — METOPROLOL TARTRATE 2.5 MG: 5 INJECTION INTRAVENOUS at 07:20

## 2025-06-16 RX ADMIN — ACETAMINOPHEN 1000 MG: 500 TABLET, FILM COATED ORAL at 05:57

## 2025-06-16 RX ADMIN — AMINOCAPROIC ACID 1 G/HR: 250 INJECTION, SOLUTION INTRAVENOUS at 07:32

## 2025-06-16 RX ADMIN — POTASSIUM CHLORIDE, DEXTROSE MONOHYDRATE AND SODIUM CHLORIDE 60 ML/HR: 150; 5; 450 INJECTION, SOLUTION INTRAVENOUS at 14:36

## 2025-06-16 RX ADMIN — HEPARIN SODIUM 5000 UNITS: 1000 INJECTION, SOLUTION INTRAVENOUS; SUBCUTANEOUS at 08:47

## 2025-06-16 RX ADMIN — NOREPINEPHRINE BITARTRATE 8 MCG: 1 INJECTION, SOLUTION, CONCENTRATE INTRAVENOUS at 07:48

## 2025-06-16 RX ADMIN — ACETAMINOPHEN 1000 MG: 10 INJECTION, SOLUTION INTRAVENOUS at 21:23

## 2025-06-16 RX ADMIN — VECURONIUM BROMIDE 5 MG: 1 INJECTION, POWDER, LYOPHILIZED, FOR SOLUTION INTRAVENOUS at 11:13

## 2025-06-16 RX ADMIN — IPRATROPIUM BROMIDE AND ALBUTEROL SULFATE 3 ML: 2.5; .5 SOLUTION RESPIRATORY (INHALATION) at 14:26

## 2025-06-16 RX ADMIN — Medication 1 APPLICATION: at 17:17

## 2025-06-16 RX ADMIN — Medication 1.75 G: at 07:30

## 2025-06-16 RX ADMIN — NICARDIPINE HYDROCHLORIDE 1000 MCG: 2.5 INJECTION INTRAVENOUS at 11:40

## 2025-06-16 RX ADMIN — PROTAMINE SULFATE 250 MG: 10 INJECTION, SOLUTION INTRAVENOUS at 11:37

## 2025-06-16 RX ADMIN — SUFENTANIL CITRATE 50 MCG: 50 INJECTION, SOLUTION EPIDURAL; INTRAVENOUS at 11:40

## 2025-06-16 RX ADMIN — AMINOCAPROIC ACID 5 G: 250 INJECTION, SOLUTION INTRAVENOUS at 07:21

## 2025-06-16 RX ADMIN — ALBUMIN (HUMAN) 500 ML: 12.5 INJECTION, SOLUTION INTRAVENOUS at 14:18

## 2025-06-16 RX ADMIN — METHOCARBAMOL TABLETS 500 MG: 500 TABLET, COATED ORAL at 19:56

## 2025-06-16 RX ADMIN — DEXMEDETOMIDINE HYDROCHLORIDE 0.2 MCG/KG/HR: 4 INJECTION, SOLUTION INTRAVENOUS at 12:22

## 2025-06-16 RX ADMIN — NOREPINEPHRINE BITARTRATE 8 MCG: 1 INJECTION, SOLUTION, CONCENTRATE INTRAVENOUS at 07:50

## 2025-06-16 RX ADMIN — MIDAZOLAM HYDROCHLORIDE 5 MG: 1 INJECTION, SOLUTION INTRAMUSCULAR; INTRAVENOUS at 11:13

## 2025-06-16 RX ADMIN — LIDOCAINE HYDROCHLORIDE 10 ML: 20 INJECTION, SOLUTION EPIDURAL; INFILTRATION; INTRACAUDAL; PERINEURAL at 07:13

## 2025-06-16 RX ADMIN — MIDAZOLAM HYDROCHLORIDE 2 MG: 2 INJECTION, SOLUTION INTRAMUSCULAR; INTRAVENOUS at 06:32

## 2025-06-16 RX ADMIN — NOREPINEPHRINE BITARTRATE 8 MCG: 1 INJECTION, SOLUTION, CONCENTRATE INTRAVENOUS at 07:17

## 2025-06-16 RX ADMIN — SUFENTANIL CITRATE 50 MCG: 50 INJECTION, SOLUTION EPIDURAL; INTRAVENOUS at 08:29

## 2025-06-16 RX ADMIN — CEFAZOLIN 2000 MG: 2 INJECTION, POWDER, FOR SOLUTION INTRAMUSCULAR; INTRAVENOUS at 07:17

## 2025-06-16 RX ADMIN — MIDAZOLAM HYDROCHLORIDE 4 MG: 1 INJECTION, SOLUTION INTRAMUSCULAR; INTRAVENOUS at 08:18

## 2025-06-16 RX ADMIN — SODIUM CHLORIDE, POTASSIUM CHLORIDE, SODIUM LACTATE AND CALCIUM CHLORIDE 1000 ML: 600; 310; 30; 20 INJECTION, SOLUTION INTRAVENOUS at 06:45

## 2025-06-16 RX ADMIN — NOREPINEPHRINE BITARTRATE 8 MCG: 1 INJECTION, SOLUTION, CONCENTRATE INTRAVENOUS at 07:31

## 2025-06-16 RX ADMIN — MORPHINE SULFATE 2 MG: 2 INJECTION, SOLUTION INTRAMUSCULAR; INTRAVENOUS at 13:53

## 2025-06-16 RX ADMIN — Medication 1250 MG: at 17:17

## 2025-06-16 RX ADMIN — HEPARIN SODIUM 40000 UNITS: 1000 INJECTION, SOLUTION INTRAVENOUS; SUBCUTANEOUS at 08:58

## 2025-06-16 RX ADMIN — SUFENTANIL CITRATE 50 MCG: 50 INJECTION, SOLUTION EPIDURAL; INTRAVENOUS at 08:01

## 2025-06-16 RX ADMIN — VECURONIUM BROMIDE 3 MG: 1 INJECTION, POWDER, LYOPHILIZED, FOR SOLUTION INTRAVENOUS at 08:19

## 2025-06-16 RX ADMIN — SODIUM CHLORIDE, POTASSIUM CHLORIDE, SODIUM LACTATE AND CALCIUM CHLORIDE 1000 ML: 600; 310; 30; 20 INJECTION, SOLUTION INTRAVENOUS at 06:21

## 2025-06-16 RX ADMIN — OXYCODONE HYDROCHLORIDE 10 MG: 10 TABLET ORAL at 18:46

## 2025-06-16 NOTE — ANESTHESIA PROCEDURE NOTES
Central Line    Pre-sedation assessment completed: 6/16/2025 7:48 AM    Patient reassessed immediately prior to procedure    Patient location during procedure: OR  Start time: 6/16/2025 7:49 AM  Stop Time:6/16/2025 7:50 AM  Indications: vascular access  Staff  Anesthesiologist: Jordan Keita MD  Preanesthetic Checklist  Completed: patient identified, IV checked, site marked, risks and benefits discussed, surgical consent, monitors and equipment checked, pre-op evaluation and timeout performed  Central Line Prep  Sterile Tech:cap, gloves, mask, sterile barriers and gown  Prep: chloraprep  no medical exclusion documented for following all elements of maximal sterile barrier technique  Patient monitoring: blood pressure monitoring, continuous pulse oximetry and EKG  Central Line Procedure  Laterality:right  Location:internal jugular  Guidance:ultrasound guided  PROCEDURE NOTE/ULTRASOUND INTERPRETATION.  Using ultrasound guidance the potential vascular sites for insertion of the catheter were visualized to determine the patency of the vessel to be used for vascular access.  After selecting the appropriate site for insertion, the needle was visualized under ultrasound being inserted into the internal jugular vein, followed by ultrasound confirmation of wire and catheter placement. There were no abnormalities seen on ultrasound; an image was taken; and the patient tolerated the procedure with no complications. Images: still images not obtained  Assessment  Complications:no  Patient Tolerance:patient tolerated the procedure well with no apparent complications  Additional Notes  UNSUCCESSFUL attempt to place central access; IJ access no issue, wire, however, failed to advance fully. Also tried left IJ even though hang up seemed to be distal, but felt same resistance. Procedure aborted. Dr Mcneil will place left subclavian access.

## 2025-06-16 NOTE — ANESTHESIA PROCEDURE NOTES
Arterial Line      Patient reassessed immediately prior to procedure    Patient location during procedure: OR  Start time: 6/16/2025 9:30 AM  Stop Time:6/16/2025 9:35 AM       Line placed for hemodynamic monitoring and ABGs/Labs/ISTAT.  Arterial Line Prep    Sterile Tech: cap, gloves, gown, mask and sterile barriers  Prep: ChloraPrep  Patient monitoring: blood pressure monitoring, continuous pulse oximetry and EKG  Arterial Line Procedure   Laterality:right  Location:  femoral artery  Catheter size: 20 G   Guidance: ultrasound guided  PROCEDURE NOTE/ULTRASOUND INTERPRETATION.  Using ultrasound guidance the potential vascular sites for insertion of the catheter were visualized to determine the patency of the vessel to be used for vascular access.  After selecting the appropriate site for insertion, the needle was visualized under ultrasound being inserted into the femoral artery, followed by ultrasound confirmation of wire and catheter placement. There were no abnormalities seen on ultrasound; an image was taken; and the patient tolerated the procedure with no complications.   Number of attempts: 1  Successful placement: yes Images: still images not obtained  Post Assessment   Dressing Type: occlusive dressing applied and secured with tape.   Complications no  Circ/Move/Sens Assessment: normal and unchanged.   Patient Tolerance: patient tolerated the procedure well with no apparent complications  Additional Notes  A. Line placed by Dr. Mcneil intraoperatively

## 2025-06-16 NOTE — PROGRESS NOTES
"Pharmacy Dosing Service  Pharmacokinetics  Vancomycin Initial Evaluation  Assessment/Action/Plan:  Pre-op dose: 1750 mg  Current Order: Vancomycin 1250 mg IVPB every 12 hours  Current end date:6/17/2025  Levels: no levels  Additional systemic antimicrobial agent(s): none  MRSA Nasal PCR ordered: No    Vancomycin dosage initiated based on population pharmacokinetic parameters. Pharmacy will continue to follow daily and adjust dose accordingly.     Subjective:  Linden Montelongo is a 79 y.o. male with a Vancomycin \"Pharmacy to Dose\" consult for the treatment of surgical prophylaxis, day 1 of 2 of treatment.    AUC Model Data:  Regimen: 1250 mg IV every 12 hours.  Start time: 18:00 on 06/16/2025  Exposure target: AUC24 (range) 400-600 mg/L.hr   AUC24,ss: 575 mg/L.hr  Probability of AUC24 > 400: 83 %  Ctrough,ss: 19.9 mg/L  Probability of Ctrough,ss > 20: 50 %  Probability of nephrotoxicity (Lodise KRISTINE 2009): 17 %      Objective:  Ht: 177 cm (69.69\"); Wt: 108 kg (238 lb 5.1 oz)  Estimated Creatinine Clearance: 65 mL/min (by C-G formula based on SCr of 1.11 mg/dL).   Creatinine   Date Value Ref Range Status   06/13/2025 1.11 0.76 - 1.27 mg/dL Final   05/01/2025 1.15 0.76 - 1.27 mg/dL Final      Lab Results   Component Value Date    WBC 9.19 06/16/2025    WBC 8.75 06/16/2025    WBC 5.57 06/13/2025      Baseline culture results:  Microbiology Results (last 10 days)       ** No results found for the last 240 hours. **            Eb Cortez, PharmD  06/16/25 13:47 CDT    "

## 2025-06-16 NOTE — H&P
No significant change.  Discussed again the operative plan, risks and benefits; he understands and agrees to proceed.    Andrew Mcneil M.D.  Cardiothoracic Surgeon    Cardiac Surgery Consultation     Referring Physician: Dr. Guerrero Land     Primary Care Physician: Dr. Daisy Groves          Chief Complaint   Patient presents with    Coronary Artery Disease       New patient referred from Dr. Land            Subjective  History of Present Illness           Review of Systems      A complete review of systems was performed, is negative except stated above.     Medical History   No past medical history on file.     Surgical History   No past surgical history on file.     No family history on file.  Social History   Social History            Tobacco Use    Smoking status: Never       Passive exposure: Past (Childhood)    Smokeless tobacco: Never   Vaping Use    Vaping status: Never Used   Substance Use Topics    Alcohol use: Not Currently       Comment: Socially    Drug use: Never       Comment: No per pt         Current Medications          Current Outpatient Medications   Medication Sig Dispense Refill    amiodarone (PACERONE) 200 MG tablet Take 1 tablet by mouth Daily.        benazepril (LOTENSIN) 5 MG tablet Take 1 tablet by mouth Daily.        citalopram (CeleXA) 10 MG tablet Take 0.5 tablets by mouth Daily.        ezetimibe (ZETIA) 10 MG tablet Take 1 tablet by mouth Daily.        furosemide (LASIX) 40 MG tablet Take 1 tablet by mouth Daily.        Metoprolol Succinate 25 MG capsule extended-release 24 hour sprinkle Take 25 mg by mouth Daily.        rivaroxaban (Xarelto) 20 MG tablet Take 1 tablet by mouth Daily.        Rosuvastatin Calcium 10 MG capsule sprinkle Take 10 mg by mouth Daily.        traZODone (DESYREL) 50 MG tablet Take 1 tablet by mouth Every Night.          No current facility-administered medications for this visit.         Allergies:  Zolpidem, Clarithromycin, and Esomeprazole    "  Objective  Vital Signs  Visit Vitals  /84   Pulse 59   Ht 180.3 cm (71\")   Wt 110 kg (242 lb)   SpO2 95%   BMI 33.75 kg/m²            Physical Exam   Physical Exam        Results Review:            WBC   Date Value Ref Range Status   04/17/2024 6.1 4.8 - 10.8 K/uL Final            RBC   Date Value Ref Range Status   04/17/2024 4.93 4.70 - 6.10 M/uL Final            Hemoglobin   Date Value Ref Range Status   05/07/2024 10.4 (L) 14.0 - 18.0 g/dL Final            Hematocrit   Date Value Ref Range Status   05/07/2024 32.6 (L) 42.0 - 52.0 % Final            MCV   Date Value Ref Range Status   04/17/2024 91.7 80.0 - 94.0 fL Final            MCH   Date Value Ref Range Status   04/17/2024 29.8 27.0 - 31.0 pg Final            MCHC   Date Value Ref Range Status   04/17/2024 32.5 (L) 33.0 - 37.0 g/dL Final            RDW   Date Value Ref Range Status   04/17/2024 14.8 (H) 11.5 - 14.5 % Final            MPV   Date Value Ref Range Status   04/17/2024 10.5 9.4 - 12.4 fL Final            Platelets   Date Value Ref Range Status   04/17/2024 191 130 - 400 K/uL Final      No results found for: \"GLUCOSE\", \"NA\", \"K\", \"CO2\", \"CL\", \"ANIONGAP\", \"CREATININE\", \"BUN\", \"BCR\", \"CALCIUM\", \"EGFRIFNONA\", \"ALKPHOS\", \"PROTEINTOT\", \"ALT\", \"AST\", \"BILITOT\", \"ALBUMIN\", \"GLOB\", \"LABIL2\"                 I reviewed the patient's clinical results and discussed with patient.     Results  I personally reviewed coronary angiography the following is my interpretation:  Right dominant coronary system no significant obstructive disease in right coronary artery, circumflex without significant obstructive disease, LAD with some diffuse disease with 1 moderate diagonal artery, there is LAD disease just past the branching point of the moderate-sized diagonal distal vessel is somewhat small but likely graftable     I personally viewed echocardiogram the following is my interpretation:  Normal LV function, highly calcified aortic valve with limited leaflet " mobility difficult to say if fused bicuspid or tricuspid, V-max greater than 400 mean gradient is 46 and aortic valve area is less than 1 I do not appreciate more than mild MR and no more than mild AI           Assessment & Plan  Assessment & Plan  Mr. Montelongo is an 79-year-old male who presents to me with severe symptomatic aortic valve stenosis and severe LAD stenosis.  He has had a murmur since late teenage years noticed when he was being evaluated to go to [place].  More recently he has had increasing tiredness, some chest pressure with activity shortness of breath with activity, he has class III NYHA heart failure symptoms.  With this he underwent workup and echocardiogram is consistent with severe aortic stenosis, normal LV function and subsequent coronary angiography shows some diffuse mid LAD disease with a graftable distal vessel.  He has has a history of carotid endarterectomy also has a history of paroxysmal atrial fibrillation.  He is active for his age.     I discussed with Mr. Montelongo and his significant other today the natural history of severe symptomatic aortic stenosis and coronary disease and treatment options.  We discussed the possibility of PCI with TAVR versus SAVR with CABG.  I think that he is overall low risk for surgery and therefore I have recommended SAVR with CABG.  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  We discussed operative expectations risk and benefits at length. We discussed the risk of surgery including but not limited to bleeding, infection, injury to major organs or vessels, chronic heart failure, arrhythmias, need for pacemaker, prolonged ventilation, renal failure, stroke, risk of anesthesia, risk of sternal wound or bone healing problems, reoperation, and/or death. I calculated his STS patient specific risks score, discussed this with him; he understands and wants to proceed with surgery.      Will complete his workup and obtain dental clearance and then plan on surgery soon.  Thank you for trusting me with the care of Mr. Montelongo.  Please do not hesitate to call questions or concerns.              Andrew Mcneil MD   Cardiothoracic Surgeon

## 2025-06-16 NOTE — ANESTHESIA POSTPROCEDURE EVALUATION
Patient: Linden Montelongo    Procedure Summary       Date: 06/16/25 Room / Location:  [facility] OR  /  [facility]    Anesthesia Start: 0707 Anesthesia Stop: 1222    Procedures:       AORTIC VALVE REPLACEMENT 23MM, CORONARY ARTERY BYPASS GRAFTING X2, LEFT INTERNAL MAMMARY ARTERY GRAFT, RIGHT LEG ENDOSCOPIC VEIN HARVEST,  MAZE WITH ENCOMPASS CLAMP, CRYOABLATION, LEFT ATRIAL APPENDAGE EXCLUSION 40MM, TRANSESOPHAGEAL ECHOCARDIOGRAM (Chest)      CORONARY ARTERY BYPASS GRAFTING (Chest)      MAZE PROCEDURE WITH ENCOMPASS CLAMP      ATRIAL APPENDAGE EXCLUSION LEFT WITH TRANSESOPHAGEAL ECHOCARDIOGRAM (Chest) Diagnosis:       Coronary artery disease involving native coronary artery of native heart without angina pectoris      Aortic valve stenosis      (Coronary artery disease involving native coronary artery of native heart without angina pectoris [I25.10])      (Aortic valve stenosis [I35.0])    Surgeons: Andrew Mcneil MD Provider: Sean Gomez CRNA    Anesthesia Type: general ASA Status: 4            Anesthesia Type: general    Vitals  Vitals Value Taken Time   BP 78/51 06/16/25 13:01   Temp     Pulse 66 06/16/25 13:12   Resp     SpO2 98 % 06/16/25 13:12   Vitals shown include unfiled device data.        Post Anesthesia Care and Evaluation    Patient location during evaluation: ICU  Patient participation: waiting for patient participation  Level of consciousness: obtunded/minimal responses  Pain score: 1    Airway patency: patent  Anesthetic complications: No anesthetic complications  PONV Status: none  Cardiovascular status: hemodynamically stable  Respiratory status: ETT, ventilator, intubated and acceptable  Hydration status: acceptable

## 2025-06-16 NOTE — ANESTHESIA PROCEDURE NOTES
Intra-Op Anesthesia SHIRLEY    Procedure Performed: Intra-Op Anesthesia SHIRLEY       Start Time:  6/16/2025 7:40 AM       End Time:   6/16/2025 7:45 AM    Preanesthesia Checklist:  Patient identified, IV assessed, risks and benefits discussed, monitors and equipment assessed, procedure being performed at surgeon's request and anesthesia consent obtained.    General Procedure Information  SHIRLEY Placed for monitoring purposes only -- This is not a diagnostic SHIRLEY

## 2025-06-16 NOTE — BRIEF OP NOTE
AORTIC VALVE REPAIR/REPLACEMENT, CORONARY ARTERY BYPASS GRAFTING, MAZE PROCEDURE, ATRIAL APPENDAGE EXCLUSION LEFT WITH TRANSESOPHAGEAL ECHOCARDIOGRAM  Progress Note    Linden Montelongo  6/16/2025    Pre-op Diagnosis:   Coronary artery disease involving native coronary artery of native heart without angina pectoris [I25.10]  Aortic valve stenosis [I35.0]       Post-Op Diagnosis Codes:     * Coronary artery disease involving native coronary artery of native heart without angina pectoris [I25.10]     * Aortic valve stenosis [I35.0]    Procedure(s):      Procedure(s):  AORTIC VALVE REPLACEMENT 23MM, CORONARY ARTERY BYPASS GRAFTING X2, LEFT INTERNAL MAMMARY ARTERY GRAFT, RIGHT LEG ENDOSCOPIC VEIN HARVEST,  MAZE WITH ENCOMPASS CLAMP, CRYOABLATION, LEFT ATRIAL APPENDAGE EXCLUSION 40MM, TRANSESOPHAGEAL ECHOCARDIOGRAM  CORONARY ARTERY BYPASS GRAFTING  MAZE PROCEDURE WITH ENCOMPASS CLAMP  ATRIAL APPENDAGE EXCLUSION LEFT WITH TRANSESOPHAGEAL ECHOCARDIOGRAM              Surgeon(s):  Andrew Mcneil MD    Anesthesia: General    Staff:   Circulator: Dania De La Rosa RN  Perfusionist: Lorenza Amaya CCP  Scrub Person: Shelly Infante; Bina Oliva  Assistant: Loulou Gomez  Other: Mami Staley RN  Assistant: Loulou Gomez    Estimated Blood Loss: NA    Urine Voided: * No values recorded between 6/16/2025  7:07 AM and 6/16/2025 12:52 PM *    Specimens:                Specimens       ID Source Type Tests Collected By Collected At Frozen?    1 Blood, Arterial Line Blood CBC (NO DIFF)  FIBRINOGEN  PROTIME-INR   Andrew Mcneil MD 6/16/25 1151     A Aortic Leaflet Tissue TISSUE PATHOLOGY EXAM   Tarun, Andrew OCONNOR MD 6/16/25 0957 No              Drains:   Chest Tube Left Pleural (Active)       Urethral Catheter Temperature probe 16 Fr. (Active)       Y Chest Tube 1 and 2 1 Right Mediastinal 24 Fr. 2 Left Mediastinal 24 Fr. (Active)       Findings: CABG x2 (LIMA to LAD, SVG to Diagonal), AVR with 23mm Inspiris, Limited Left  Atrial MAZE with LAAE (40mm Atriclip)      Complications: None Immediate    Assistant: Loulou Gomez  was responsible for performing the following activities: Retraction, Suction, Suturing, Closing, Placing Dressing, and Harvesting of Vessels and their skilled assistance was necessary for the success of this case.    Andrew Mcneil MD     Date: 6/16/2025  Time: 13:36 CDT

## 2025-06-16 NOTE — ANESTHESIA PROCEDURE NOTES
Arterial Line      Patient reassessed immediately prior to procedure    Patient location during procedure: pre-op  Start time: 6/16/2025 6:15 AM  Stop Time:6/16/2025 6:20 AM       Line placed for hemodynamic monitoring and ABGs/Labs/ISTAT.  Performed By   Anesthesiologist: Jordan Keita MD   Preanesthetic Checklist  Completed: patient identified, IV checked and pre-op evaluation  Arterial Line Prep    Sterile Tech: gloves, cap, gown and mask  Prep: ChloraPrep  Patient monitoring: blood pressure monitoring, continuous pulse oximetry and EKG  Arterial Line Procedure   Laterality:left  Location:  radial artery  Catheter size: 20 G   Guidance: ultrasound guided  PROCEDURE NOTE/ULTRASOUND INTERPRETATION.  Using ultrasound guidance the potential vascular sites for insertion of the catheter were visualized to determine the patency of the vessel to be used for vascular access.  After selecting the appropriate site for insertion, the needle was visualized under ultrasound being inserted into the radial artery, followed by ultrasound confirmation of wire and catheter placement. There were no abnormalities seen on ultrasound; an image was taken; and the patient tolerated the procedure with no complications.   Number of attempts: 1  Successful placement: yes Images: still images not obtained  Post Assessment   Dressing Type: occlusive dressing applied, secured with tape and wrist guard applied.   Complications no  Circ/Move/Sens Assessment: normal and unchanged.   Patient Tolerance: patient tolerated the procedure well with no apparent complications

## 2025-06-16 NOTE — PAYOR COMM NOTE
"ADMIT INPT 6-16-25  [id number]       Linden Suárez (79 y.o. Male)       Date of Birth   1945    Social Security Number       Address   [address]  [facility] [address]    Home Phone   [phone]    MRN   [id number]       [employer]   None    Marital Status                               Admission Date   6/16/2025    Admission Type   Elective    Admitting Provider   Andrew Mcneil MD    Attending Provider   Andrew Mcneil MD    Department, Room/Bed   [facility] INTENSIVE CARE, I010/1       Discharge Date       Discharge Disposition       Discharge Destination                                 Attending Provider: Andrew Mcneil MD    Allergies: Zolpidem, Clarithromycin, Esomeprazole    Isolation: None   Infection: None   Code Status: CPR    Ht: 177 cm (69.69\")   Wt: 108 kg (238 lb 5.1 oz)    Admission Cmt: None   Principal Problem: Nonrheumatic aortic (valve) stenosis [I35.0]                   Active Insurance as of 6/16/2025       Primary Coverage       Payor Plan Insurance Group Employer/Plan Group    [employer] MEDICARE REPLACEMENT [employer] PPO [id number]       Payor Plan Address Payor Plan Phone Number Payor Plan Fax Number Effective Dates    [address] [address] [phone]  1/1/2019 - None Entered    Former[facility] [id number]         Subscriber Name Subscriber Birth Date Member ID       LINDEN SUÁREZ 1945 [id number]                     Emergency Contacts        (Rel.) Home Phone Work Phone Mobile Phone    Summer Laguerre (Significant Other) -- -- [phone]    dorethachi ryan (Daughter) [phone] -- [phone]                 History & Physical        Andrew Mcneil MD at 06/16/25 0703          No significant change.  Discussed again the operative plan, risks and benefits; he understands and agrees to proceed.    Andrew Mcneil M.D.  Cardiothoracic Surgeon    Cardiac Surgery Consultation     Referring Physician: Dr. Guerrero Land     Primary Care Physician: Dr." "Daisy Groves          Chief Complaint   Patient presents with    Coronary Artery Disease       New patient referred from Dr. Land            Subjective  History of Present Illness           Review of Systems      A complete review of systems was performed, is negative except stated above.     Medical History   No past medical history on file.     Surgical History   No past surgical history on file.     No family history on file.  Social History   Social History            Tobacco Use    Smoking status: Never       Passive exposure: Past (Childhood)    Smokeless tobacco: Never   Vaping Use    Vaping status: Never Used   Substance Use Topics    Alcohol use: Not Currently       Comment: Socially    Drug use: Never       Comment: No per pt         Current Medications          Current Outpatient Medications   Medication Sig Dispense Refill    amiodarone (PACERONE) 200 MG tablet Take 1 tablet by mouth Daily.        benazepril (LOTENSIN) 5 MG tablet Take 1 tablet by mouth Daily.        citalopram (CeleXA) 10 MG tablet Take 0.5 tablets by mouth Daily.        ezetimibe (ZETIA) 10 MG tablet Take 1 tablet by mouth Daily.        furosemide (LASIX) 40 MG tablet Take 1 tablet by mouth Daily.        Metoprolol Succinate 25 MG capsule extended-release 24 hour sprinkle Take 25 mg by mouth Daily.        rivaroxaban (Xarelto) 20 MG tablet Take 1 tablet by mouth Daily.        Rosuvastatin Calcium 10 MG capsule sprinkle Take 10 mg by mouth Daily.        traZODone (DESYREL) 50 MG tablet Take 1 tablet by mouth Every Night.          No current facility-administered medications for this visit.         Allergies:  Zolpidem, Clarithromycin, and Esomeprazole     Objective  Vital Signs  Visit Vitals  /84   Pulse 59   Ht 180.3 cm (71\")   Wt 110 kg (242 lb)   SpO2 95%   BMI 33.75 kg/m²            Physical Exam   Physical Exam        Results Review:            WBC   Date Value Ref Range Status   04/17/2024 6.1 4.8 - 10.8 K/uL Final         " "   RBC   Date Value Ref Range Status   04/17/2024 4.93 4.70 - 6.10 M/uL Final            Hemoglobin   Date Value Ref Range Status   05/07/2024 10.4 (L) 14.0 - 18.0 g/dL Final            Hematocrit   Date Value Ref Range Status   05/07/2024 32.6 (L) 42.0 - 52.0 % Final            MCV   Date Value Ref Range Status   04/17/2024 91.7 80.0 - 94.0 fL Final            MCH   Date Value Ref Range Status   04/17/2024 29.8 27.0 - 31.0 pg Final            MCHC   Date Value Ref Range Status   04/17/2024 32.5 (L) 33.0 - 37.0 g/dL Final            RDW   Date Value Ref Range Status   04/17/2024 14.8 (H) 11.5 - 14.5 % Final            MPV   Date Value Ref Range Status   04/17/2024 10.5 9.4 - 12.4 fL Final            Platelets   Date Value Ref Range Status   04/17/2024 191 130 - 400 K/uL Final      No results found for: \"GLUCOSE\", \"NA\", \"K\", \"CO2\", \"CL\", \"ANIONGAP\", \"CREATININE\", \"BUN\", \"BCR\", \"CALCIUM\", \"EGFRIFNONA\", \"ALKPHOS\", \"PROTEINTOT\", \"ALT\", \"AST\", \"BILITOT\", \"ALBUMIN\", \"GLOB\", \"LABIL2\"                 I reviewed the patient's clinical results and discussed with patient.     Results  I personally reviewed coronary angiography the following is my interpretation:  Right dominant coronary system no significant obstructive disease in right coronary artery, circumflex without significant obstructive disease, LAD with some diffuse disease with 1 moderate diagonal artery, there is LAD disease just past the branching point of the moderate-sized diagonal distal vessel is somewhat small but likely graftable     I personally viewed echocardiogram the following is my interpretation:  Normal LV function, highly calcified aortic valve with limited leaflet mobility difficult to say if fused bicuspid or tricuspid, V-max greater than 400 mean gradient is 46 and aortic valve area is less than 1 I do not appreciate more than mild MR and no more than mild AI           Assessment & Plan  Assessment & Plan  Mr. Montelongo is an 79-year-old male who " presents to me with severe symptomatic aortic valve stenosis and severe LAD stenosis.  He has had a murmur since late teenage years noticed when he was being evaluated to go to [place].  More recently he has had increasing tiredness, some chest pressure with activity shortness of breath with activity, he has class III NYHA heart failure symptoms.  With this he underwent workup and echocardiogram is consistent with severe aortic stenosis, normal LV function and subsequent coronary angiography shows some diffuse mid LAD disease with a graftable distal vessel.  He has has a history of carotid endarterectomy also has a history of paroxysmal atrial fibrillation.  He is active for his age.     I discussed with Mr. Montelongo and his significant other today the natural history of severe symptomatic aortic stenosis and coronary disease and treatment options.  We discussed the possibility of PCI with TAVR versus SAVR with CABG.  I think that he is overall low risk for surgery and therefore I have recommended SAVR with CABG.  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  We discussed operative expectations risk and benefits at length. We discussed the risk of surgery including but not limited to bleeding, infection, injury to major organs or vessels, chronic heart failure, arrhythmias, need for pacemaker, prolonged ventilation, renal failure, stroke, risk of anesthesia, risk of sternal wound or bone healing problems, reoperation, and/or death. I calculated his STS patient specific risks score, discussed this with him; he understands and wants to proceed with surgery.     Will complete his workup and obtain dental clearance and then plan on surgery soon.  Thank you for trusting me with the care of Mr. Montelongo.  Please do not hesitate to call questions or concerns.              Andrew Mcneil MD   Cardiothoracic Surgeon    Electronically signed by Tarun  Andrew OCONNOR MD at 25 0704       Facility-Administered Medications as of 2025   Medication Dose Route Frequency Provider Last Rate Last Admin    acetaminophen (OFIRMEV) injection 1,000 mg  1,000 mg Intravenous Q8H Mcneil, Andrew OCONNOR MD   1,000 mg at 25 1406    [COMPLETED] acetaminophen (TYLENOL) tablet 1,000 mg  1,000 mg Oral Once Maryann Chavez APRN   1,000 mg at 25 0557    [START ON 2025] acetaminophen (TYLENOL) tablet 650 mg  650 mg Oral Q6H Mcneil, Andrew OCONNOR MD        [COMPLETED] albumin human 5 % solution 500 mL  500 mL Intravenous Once Mcneil, Andrew OCONNOR MD   500 mL at 25 1339    albumin human 5 % solution 500 mL  500 mL Intravenous PRN Mcneil, Andrew OCONNOR MD   500 mL at 25 1418    albuterol (PROVENTIL) nebulizer solution 0.083% 2.5 mg/3mL  2.5 mg Nebulization Q4H PRN Mcneil, Andrew OCONNOR MD        [] aminocaproic acid (AMICAR) 15 g in sodium chloride 0.9 % 300 mL infusion  1 g/hr Intravenous Continuous Mcneil, Andrew OCONNOR MD   Stopped at 25 1642    [START ON 2025] amiodarone (PACERONE) tablet 200 mg  200 mg Oral Daily Tarun, Andrew OCONNOR MD        [START ON 2025] aspirin chewable tablet 162 mg  162 mg Oral Once Mcneil, Andrew OCONNOR MD        [START ON 2025] aspirin EC tablet 81 mg  81 mg Oral Daily Mcneil, Andrew OCONNOR MD        [START ON 2025] atorvastatin (LIPITOR) tablet 20 mg  20 mg Oral Nightly Mcneil, Andrew OCONNOR MD        [START ON 2025] bisacodyl (DULCOLAX) EC tablet 10 mg  10 mg Oral BID Tarun, Andrew OCONNOR MD        [COMPLETED] calcium chloride injection 1 g  1 g Intravenous Once Tarun, Andrew OCONNOR MD   1 g at 25 1451    Calcium Replacement - Follow Nurse / BPA Driven Protocol   Not Applicable PRN Mcneil, Andrew OCONNOR MD        [COMPLETED] ceFAZolin 2000 mg IVPB in 100 mL NS (MBP)  2,000 mg Intravenous Once Maryann Chavez APRN   2,000 mg at 25 0717    [COMPLETED] Chlorhexidine Gluconate Cloth 2 % pads 1 Application  1 Application Topical Once Andrew Mcneil,  MD   1 Application at 06/16/25 1406    [START ON 6/17/2025] Chlorhexidine Gluconate Cloth 2 % pads 1 Application  1 Application Topical Q24H Mcneil, Andrew OCONNOR MD        [START ON 6/17/2025] citalopram (CeleXA) tablet 5 mg  5 mg Oral Daily Mcneil, Andrew OCONNOR MD        clevidipine (CLEVIPREX) infusion 0.5 mg/mL  2-32 mg/hr Intravenous Continuous PRN Mcneil, Andrew OCONNOR MD        [START ON 6/17/2025] clopidogrel (PLAVIX) tablet 75 mg  75 mg Oral Daily Mcneil, Andrew OCONNOR MD        dexmedetomidine (PRECEDEX) 400 mcg in 100 mL NS infusion  0.2-1.5 mcg/kg/hr Intravenous Titrated Mcneil, Andrew OCONNOR MD   Held at 06/16/25 1536    dextrose (D50W) (25 g/50 mL) IV injection 10-50 mL  10-50 mL Intravenous Q15 Min PRN Mcneil, Andrew OCONNOR MD        dextrose (GLUTOSE) oral gel 15 g  15 g Oral Q15 Min PRN Mcneil, Andrew OCONNOR MD        dextrose 5 % and sodium chloride 0.45 % with KCl 20 mEq/L infusion  60 mL/hr Intravenous Continuous Mcneil, Andrew OCONNOR MD 60 mL/hr at 06/16/25 1436 60 mL/hr at 06/16/25 1436    [START ON 6/17/2025] enoxaparin sodium (LOVENOX) syringe 40 mg  40 mg Subcutaneous Daily Mcneil, Andrew OCONNOR MD        glucagon (GLUCAGEN) injection 1 mg  1 mg Intramuscular Q15 Min PRN Mcneil, Andrew OCONNOR MD        insulin regular 1 unit/mL in 0.9% sodium chloride (Glucommander)  0-100 Units/hr Intravenous Titrated Mcneil, Andrew OCONNOR MD 2 mL/hr at 06/16/25 1739 2 Units/hr at 06/16/25 1739    ipratropium-albuterol (DUO-NEB) nebulizer solution 3 mL  3 mL Nebulization 4x Daily - RT Mcneil, Andrew OCONNOR MD   3 mL at 06/16/25 1426    Magnesium Cardiology Dose Replacement - Follow Nurse / BPA Driven Protocol   Not Applicable PRN Mcneil, Andrew OCONNOR MD        meperidine (DEMEROL) injection 25 mg  25 mg Intravenous Q1H PRN Mcneil, Andrew OCONNOR MD        [START ON 6/17/2025] metoprolol tartrate (LOPRESSOR) tablet 12.5 mg  12.5 mg Oral Q12H Mcneil, Andrew OCONNOR MD        [COMPLETED] Midazolam HCl (PF) (VERSED) injection 2 mg  2 mg Intravenous Once Jordan Keita MD   2 mg at 06/16/25 0632     Morphine sulfate (PF) injection 2 mg  2 mg Intravenous Q30 Min PRN Mcneil, Andrew OCONNOR MD   2 mg at 06/16/25 1353    mupirocin (BACTROBAN) 2 % nasal ointment 1 Application  1 Application Each Nare BID Mcneil, Andrew OCONNOR MD   1 Application at 06/16/25 1717    [COMPLETED] mupirocin (BACTROBAN) 2 % nasal ointment   Each Nare Once Maryann Chavez APRN   1 Application at 06/16/25 0557    niCARdipine (CARDENE) 20 mg in 200 mL NS infusion  5-15 mg/hr Intravenous Continuous PRN Mcneil, Andrew OCONNOR MD   Held at 06/16/25 1410    norepinephrine (LEVOPHED) 8 mg in 250 mL NS infusion (premix)  0.02-0.3 mcg/kg/min Intravenous Continuous PRN Mcneil, Andrew OCONNOR MD 4.05 mL/hr at 06/16/25 1827 0.02 mcg/kg/min at 06/16/25 1827    ondansetron (ZOFRAN) injection 4 mg  4 mg Intravenous Q6H PRN Mcneil, Andrew OCONNOR MD        oxyCODONE (ROXICODONE) immediate release tablet 10 mg  10 mg Oral Q4H PRN Mcneil, Andrew OCONNOR MD        oxyCODONE (ROXICODONE) immediate release tablet 5 mg  5 mg Oral Q4H PRN Mcneil, Andrew OCONNOR MD        Pharmacy to dose vancomycin   Not Applicable Continuous PRN Mcneil, Andrew OCONNOR MD        Phosphorus Replacement - Follow Nurse / BPA Driven Protocol   Not Applicable PRN Mcneil, Andrew OCONNOR MD        [START ON 6/17/2025] polyethylene glycol (MIRALAX) packet 17 g  17 g Oral Daily Mcneil, Andrew OCONNOR MD        Potassium Replacement - Follow Nurse / BPA Driven Protocol   Not Applicable PRN Mcneil, Andrew OCONNOR MD        propofol (DIPRIVAN) infusion 10 mg/mL 100 mL  5-50 mcg/kg/min Intravenous Continuous PRN Mcneil, Andrew OCONNOR MD        vancomycin IVPB 1250 mg in 250 mL NS (premix)  1,250 mg Intravenous Q12H Mcneil, Andrew OCONNOR  mL/hr at 06/16/25 1717 1,250 mg at 06/16/25 1717     Orders (all)        Start     Ordered    06/19/25 0600  XR Chest PA & Lateral  1 Time Imaging         06/16/25 1338    06/18/25 1200  Remove All Dressings 48 Hours Post-Op  Once         06/16/25 1338    06/18/25 1200  Leave Incisions Open to Air Unless Draining or Edges Are Not  "Approximated  Continuous         06/16/25 1338    06/18/25 0900  aspirin EC tablet 81 mg  Daily         06/16/25 1338    06/18/25 0600  Wound Care  Daily       06/16/25 1338    06/18/25 0600  CBC (No Diff)  Daily       06/16/25 1338    06/18/25 0600  Basic Metabolic Panel  Daily       06/16/25 1338    06/17/25 2100  atorvastatin (LIPITOR) tablet 20 mg  Nightly         06/16/25 1338    06/17/25 1800  clopidogrel (PLAVIX) tablet 75 mg  Daily         06/16/25 1338    06/17/25 1400  Intake & Output  Every Shift       06/16/25 1338    06/17/25 1334  acetaminophen (TYLENOL) tablet 650 mg  Every 4 Hours PRN,   Status:  Discontinued        Placed in \"Or\" Linked Group    06/16/25 1338    06/17/25 1334  acetaminophen (TYLENOL) 160 MG/5ML oral solution 650 mg  Every 4 Hours PRN,   Status:  Discontinued        Placed in \"Or\" Linked Group    06/16/25 1338    06/17/25 1334  acetaminophen (TYLENOL) suppository 650 mg  Every 4 Hours PRN,   Status:  Discontinued        Placed in \"Or\" Linked Group    06/16/25 1338    06/17/25 1200  Vital Signs  Every 4 Hours      Comments: Perform Vitals Less Frequently Only if Patient Stable      06/16/25 1338    06/17/25 0900  amiodarone (PACERONE) tablet 200 mg  Daily         06/16/25 1336    06/17/25 0900  citalopram (CeleXA) tablet 5 mg  Daily         06/16/25 1336    06/17/25 0900  enoxaparin sodium (LOVENOX) syringe 40 mg  Daily         06/16/25 1338    06/17/25 0900  aspirin chewable tablet 162 mg  Once         06/16/25 1338    06/17/25 0900  metoprolol tartrate (LOPRESSOR) tablet 12.5 mg  Every 12 Hours Scheduled         06/16/25 1338    06/17/25 0900  bisacodyl (DULCOLAX) EC tablet 10 mg  2 Times Daily         06/16/25 1338    06/17/25 0900  polyethylene glycol (MIRALAX) packet 17 g  Daily         06/16/25 1338    06/17/25 0800  Wean & Extubate Per Rapid Wean and Extubation Protocol  Every Morning       06/16/25 1338    06/17/25 0800  Wean FiO2 per Respiratory Therapist for SpO2  for SpO2 " >92%, until at 30 % FiO2  Every Morning      Comments: Wean FiO2 per Respiratory Therapist for SpO2  for SpO2 >92%, until at 30 % FiO2    06/16/25 1338    06/17/25 0800  Wean Respiratory Rate by a minimum of 2 every hour if indicated until the rate of 4 is achieved.  Every Morning      Comments: Wean Respiratory Rate by a minimum of 2 every hour if indicated until the rate of 4 is achieved.    06/16/25 1338    06/17/25 0600  CBC & Differential  Daily       06/16/25 1338    06/17/25 0600  Renal Function Panel  Daily       06/16/25 1338    06/17/25 0600  Magnesium  Daily       06/16/25 1338    06/17/25 0600  Protime-INR  Daily       06/16/25 1338    06/17/25 0600  XR Chest 1 View  Daily       06/16/25 1338    06/17/25 0600  ECG 12 Lead Other; s/p AVR CABG  Daily       06/16/25 1338    06/17/25 0600  acetaminophen (TYLENOL) tablet 650 mg  Every 6 Hours Scheduled         06/16/25 1342    06/17/25 0400  Chlorhexidine Gluconate Cloth 2 % pads 1 Application  Every 24 Hours         06/16/25 1338    06/16/25 1822  Blood Gas, Arterial -  PROCEDURE ONCE         06/16/25 1816    06/16/25 1800  Ambulate Patient  3 Times Daily         06/16/25 1338    06/16/25 1800  mupirocin (BACTROBAN) 2 % nasal ointment 1 Application  2 Times Daily         06/16/25 1338    06/16/25 1800  vancomycin IVPB 1250 mg in 250 mL NS (premix)  Every 12 Hours         06/16/25 1347    06/16/25 1750  POC Glucose Once  PROCEDURE ONCE        Comments: Complete no more than 45 minutes prior to patient eating      06/16/25 1738    06/16/25 1733  Blood Gas, Arterial -  Once        Comments: if no ABG has been obtained in the previous 4 hours during Vent Liberation      06/16/25 1338    06/16/25 1653  POC Glucose Once  PROCEDURE ONCE        Comments: Complete no more than 45 minutes prior to patient eating      06/16/25 1641    06/16/25 1600  Check Peripheral Pulses Every 4 Hours  Every 4 Hours       06/16/25 1338    06/16/25 1545  calcium chloride injection 1 g   Once         06/16/25 1450    06/16/25 1509  POC Glucose Once  PROCEDURE ONCE        Comments: Complete no more than 45 minutes prior to patient eating      06/16/25 1458    06/16/25 1505  Blood Gas, Arterial -  PROCEDURE ONCE         06/16/25 1503    06/16/25 1500  ipratropium-albuterol (DUO-NEB) nebulizer solution 3 mL  4 Times Daily - RT         06/16/25 1338    06/16/25 1500  aminocaproic acid (AMICAR) 15 g in sodium chloride 0.9 % 300 mL infusion  Continuous         06/16/25 1405    06/16/25 1430  albumin human 5 % solution 500 mL  Once         06/16/25 1333    06/16/25 1430  dexmedetomidine (PRECEDEX) 400 mcg in 100 mL NS infusion  Titrated,   Status:  Discontinued         06/16/25 1333    06/16/25 1430  norepinephrine (LEVOPHED) 8 mg in 250 mL NS infusion (premix)  Titrated,   Status:  Discontinued         06/16/25 1334    06/16/25 1430  Chlorhexidine Gluconate Cloth 2 % pads 1 Application  Once         06/16/25 1338    06/16/25 1430  insulin regular 1 unit/mL in 0.9% sodium chloride (Glucommander)  Titrated        Note to Pharmacy: Upon initiation of Glucommander insulin drip, make sure all other insulin orders and anti-diabetic medications have been discontinued.    06/16/25 1338    06/16/25 1430  dexmedetomidine (PRECEDEX) 400 mcg in 100 mL NS infusion  Titrated         06/16/25 1338    06/16/25 1430  dextrose 5 % and sodium chloride 0.45 % with KCl 20 mEq/L infusion  Continuous         06/16/25 1338    06/16/25 1415  niCARdipine (CARDENE) 20 mg in 200 mL NS infusion  Titrated,   Status:  Discontinued         06/16/25 1321    06/16/25 1415  aminocaproic acid (AMICAR) 15 g in sodium chloride 0.9 % 300 mL infusion  Continuous,   Status:  Discontinued         06/16/25 1329    06/16/25 1400  Vital Signs Every Hour Until 24 Hours Post Op  Every Hour      Comments: Perform Vitals Less Frequently Only if Patient Stable      06/16/25 1338    06/16/25 1400  Check Peripheral Pulses Every 1 Hour x4  Every Hour        06/16/25 1338    06/16/25 1400  Intake & Output Every 15 Minutes x2, Every 30 Minutes x4  Every Hour       06/16/25 1338    06/16/25 1400  Intake & Output Every Hour Until 24 Hours Post Op  Every Hour       06/16/25 1338    06/16/25 1400  Cardiac Output Parameters On Admission, Then Every 1 Hour x4  Every Hour       06/16/25 1338    06/16/25 1400  Cardiac Output Parameters Every 2 Hours Until 24 Hours Post Op  Every 2 Hours       06/16/25 1338    06/16/25 1400  Incentive Spirometry  Every Hour While Awake       06/16/25 1338    06/16/25 1400  Nurse and RT to Assess Patient for Readiness to Wean Criteria as Follows:  Every Hour      Comments: Patient is awake and following commands, Patient is spontaneously breathing, respiratory rate <25/min, Patient's hemodynamics are stable, Patient has no evidence of clinically significant bleeding, SpO2 >92% on <30% FiO2, PEEP < or = to 8.    06/16/25 1338    06/16/25 1400  acetaminophen (OFIRMEV) injection 1,000 mg  Every 8 Hours         06/16/25 1338    06/16/25 1350  CBC Auto Differential  Once,   Status:  Canceled         06/16/25 1349    06/16/25 1350  Manual Differential  Once         06/16/25 1349    06/16/25 1346  POC Glucose Once  PROCEDURE ONCE        Comments: Complete no more than 45 minutes prior to patient eating      06/16/25 1335    06/16/25 1339  Daily Weights  Daily       06/16/25 1338    06/16/25 1338  Code Status and Medical Interventions: CPR (Attempt to Resuscitate); Full Support  Continuous         06/16/25 1338    06/16/25 1338  Vital Signs & Post-Op Checks Every 15 Minutes x2, Every 30 Minutes x4  Per Hospital Policy/Protocol,   Status:  Canceled        Comments: Perform Vitals Less Frequently Only if Patient Stable    06/16/25 1338    06/16/25 1338  Continuous Cardiac Monitoring  Continuous        Comments: Follow Standing Orders As Outlined in Process Instructions (Open Order Report to View Full Instructions)    06/16/25 1338    06/16/25 1338   Telemetry - Maintain IV Access  Continuous         06/16/25 1338    06/16/25 1338  Telemetry - Place Orders & Notify Provider of Results When Patient Experiences Acute Chest Pain, Dysrhythmia or Respiratory Distress  Until Discontinued         06/16/25 1338    06/16/25 1338  May Be Off Telemetry for Tests  Continuous         06/16/25 1338    06/16/25 1338  Continuous Pulse Oximetry  Continuous         06/16/25 1338    06/16/25 1338  Discontinue NG After Extubation  Once         06/16/25 1338    06/16/25 1338  Chest Tube Settings Chest Tube, Mediastinal Tube; -20 cm Suction  Continuous         06/16/25 1338    06/16/25 1338  Begin Rewarming with Thermal Unit As Needed For Temp Less Than 96F  Once         06/16/25 1338    06/16/25 1338  ACE Wraps to Vein [place] [facility] Site for 24 Hours, Then Apply DOUG Hose  Continuous         06/16/25 1338    06/16/25 1338  Wound Dressing  Continuous         06/16/25 1338    06/16/25 1338  Notify Surgeon if Drainage From Surgical Incision Site  Until Discontinued         06/16/25 1338    06/16/25 1338  ISOLATE PACER WIRES  Continuous         06/16/25 1338    06/16/25 1338  Turn Patient Every 2 Hours or Begin Bed Rotation Once Hemodynamically Stable  Now Then Every 2 Hours         06/16/25 1338    06/16/25 1338  Advance PROM to AROM  Now Then Every 4 Hours         06/16/25 1338    06/16/25 1338  Up in Chair for Meals  Until Discontinued         06/16/25 1338    06/16/25 1338  Notify Provider - Urine Output  Until Discontinued         06/16/25 1338    06/16/25 1338  Notify Provider - Chest Tube Output  Until Discontinued         06/16/25 1338    06/16/25 1338  Notify Provider (Specify)  Until Discontinued         06/16/25 1338    06/16/25 1338  Cardiac Sternal Precautions - Maintain at All Times & Teach Patient  Continuous        Comments: Maintain Sternal Precautions at All Times & Teach Patient    06/16/25 1338    06/16/25 1338  Fall Precautions  Continuous         06/16/25 1338     06/16/25 1338  Initial Ventilator Settings Per Pulmonologist / Anesthesiologist  Once         06/16/25 1338 06/16/25 1338  Ventilator - Vent Mode: Alternate (Custom) Mode: See Comments  Continuous,   Status:  Canceled        Comments: Initial Mechanical Ventilation Settings upon Arrival to the ICU: SIMV, Tidal Volume 5-7 mL/kg ideal body weight based on height formula, Respiratory Rate 14-16/min, PEEP 5 cmH2O if <90kg, PEEP 8 cmH2O if >90kg, FiO2 60%.    06/16/25 1338 06/16/25 1338  Ventilator - Vent Mode: Alternate (Custom) Mode: See Comments  Continuous        Comments: RT adjusts Mechanical Ventilation as needed to achieve: pH 7.35-7.45, pCO2 34-45 mmHg, PaO2 > 90 mmHg, SpO2 > 92%.    06/16/25 1338 06/16/25 1338  Extubate Patient If  All of the Following Criteria are Met:  Once        Comments: Patient tolerated Spontaneous Breathing Trial for 20 minutes, respiratory rate < 25/min, SpO2 >94%, patient remains hemodynamically stable, patient is awake and following commands, RSBI f/vt < 90, Elevate HOB > 35 degrees, Vital Capacity: 10-15 mL/kg, NIF >-25 cm H2O, minute ventilation <14 l/min, Obtain ABG: pH of 7.33 to 7.55, confirm with nurse if ok to extubate. Prior to extubation, Propofol must be off, ok to proceed with Precedex with provider order. If criteria NOT met: Wait 1 hour, reassess. If still does not meet criteria: call Physician.    06/16/25 1338 06/16/25 1338  Notify Physician if Vapotherm Mask Needed to Keep SpO2 Greater Than 90% or Continuing Respiratory Distress  Until Discontinued        Comments: Notify Physician if Vapotherm mask needed to keep SpO2 >90% or continuing respiratory distress.r    06/16/25 1338 06/16/25 1338  Call Physician if Racemic needed.  Until Discontinued        Comments: Call Physician if Racemic needed.    06/16/25 1338 06/16/25 1338  RT To Evaluate & Demonstrate Use of IS  Once        Comments: RT To Evaluate & Demonstrate Use of IS    06/16/25 1338     06/16/25 1338  XR Chest 1 View  1 Time Imaging         06/16/25 1338    06/16/25 1338  ECG 12 Lead Other; s/p AVR CABG  STAT         06/16/25 1338    06/16/25 1338  Protime-INR  STAT         06/16/25 1338    06/16/25 1338  aPTT  STAT         06/16/25 1338    06/16/25 1338  Calcium, Ionized  STAT         06/16/25 1338    06/16/25 1338  Blood Gas, Arterial -  STAT         06/16/25 1338    06/16/25 1338  CBC & Differential  STAT,   Status:  Canceled         06/16/25 1338    06/16/25 1338  Fibrinogen  STAT         06/16/25 1338    06/16/25 1338  CBC (No Diff)  Now Then Every 4 Hours       06/16/25 1338    06/16/25 1338  Renal Function Panel  Now Then Every 4 Hours       06/16/25 1338    06/16/25 1338  Draw Labs and Notify Provider if Chest Tube Output Greater Than 100mL/hr  Until Discontinued         06/16/25 1338    06/16/25 1338  Advance Diet As Tolerated -  Until Discontinued         06/16/25 1338    06/16/25 1338  Cardiac Rehab Evaluation  Once        Provider:  (Not yet assigned)    06/16/25 1338    06/16/25 1338  PT Consult: Eval & Treat  Once         06/16/25 1338    06/16/25 1338  RN to Release PRN POC Glucose Orders Per Glucommander  Continuous         06/16/25 1338    06/16/25 1338  RN to Order STAT Glucose for BG Less Than 10 mg/dl or Greater Than 600 mg/dl  Continuous        Comments: Do not delay treatment of unstable patient in order to obtain glucose sample from Lab.   Inform provider of results.    06/16/25 1338    06/16/25 1338  Prior to Initiating Glucommander™, Ensure All Prior Insulin Orders are Discontinued  Once         06/16/25 1338    06/16/25 1338  PRIOR to START of Intravenous Insulin Infusion, Notify Provider if K+ is Less Than 3.3, for Extra Potassium Orders, if Indicated. Do Not Start Insulin Drip if K+ Less Than 3.3.  Per Order Details         06/16/25 1338    06/16/25 1338  Use a Dedicated Line for Insulin Infusion (If Possible).  May Use a Carrier Fluid of NS at KVO Rate if Insulin Rate  is Insufficient to Maintain IV Patency.  Prime IV Line With Insulin Infusion  Continuous         06/16/25 1338    06/16/25 1338  If Insulin Infusion is Discontinued, Glucommander Must Also be Discontinued. If Insulin Infusion is Re-Ordered Discontinue Previous Settings in Glucommander & Start New  Per Order Details         06/16/25 1338    06/16/25 1338  Patient is on Glucommander  Continuous         06/16/25 1338    06/16/25 1338  Notify Provider  Continuous        Comments: Open Order Report to View Parameters Requiring Provider Notification    06/16/25 1338    06/16/25 1338  If Patient Has Diet Order or Bolus Tube Feeds Utilize the Start Meal / Meal Bolus Feature in Glucommander  Continuous         06/16/25 1338    06/16/25 1338  NPO Diet NPO Type: Sips with Meds  Diet Effective Now         06/16/25 1338    06/16/25 1337  niCARdipine (CARDENE) 20 mg in 200 mL NS infusion  Continuous PRN         06/16/25 1338    06/16/25 1337  norepinephrine (LEVOPHED) 8 mg in 250 mL NS infusion (premix)  Continuous PRN         06/16/25 1338    06/16/25 1337  clevidipine (CLEVIPREX) infusion 0.5 mg/mL  Continuous PRN         06/16/25 1338    06/16/25 1337  dextrose (GLUTOSE) oral gel 15 g  Every 15 Minutes PRN         06/16/25 1338    06/16/25 1337  dextrose (D50W) (25 g/50 mL) IV injection 10-50 mL  Every 15 Minutes PRN         06/16/25 1338    06/16/25 1337  glucagon (GLUCAGEN) injection 1 mg  Every 15 Minutes PRN         06/16/25 1338    06/16/25 1337  albumin human 5 % solution 500 mL  As Needed         06/16/25 1338    06/16/25 1337  propofol (DIPRIVAN) infusion 10 mg/mL 100 mL  Continuous PRN         06/16/25 1338    06/16/25 1337  meperidine (DEMEROL) injection 25 mg  Every 1 Hour PRN         06/16/25 1338    06/16/25 1337  Potassium Replacement - Follow Nurse / BPA Driven Protocol  As Needed         06/16/25 1338    06/16/25 1337  Magnesium Cardiology Dose Replacement - Follow Nurse / BPA Driven Protocol  As Needed          "06/16/25 1338    06/16/25 1337  Phosphorus Replacement - Follow Nurse / BPA Driven Protocol  As Needed         06/16/25 1338    06/16/25 1337  Calcium Replacement - Follow Nurse / BPA Driven Protocol  As Needed         06/16/25 1338    06/16/25 1337  ondansetron (ZOFRAN) injection 4 mg  Every 6 Hours PRN         06/16/25 1338    06/16/25 1337  Pharmacy to dose vancomycin  Continuous PRN         06/16/25 1338    06/16/25 1337  oxyCODONE (ROXICODONE) immediate release tablet 5 mg  Every 4 Hours PRN         06/16/25 1338    06/16/25 1337  oxyCODONE (ROXICODONE) immediate release tablet 10 mg  Every 4 Hours PRN         06/16/25 1338    06/16/25 1337  Morphine sulfate (PF) injection 2 mg  Every 30 Minutes PRN         06/16/25 1338    06/16/25 1337  albuterol (PROVENTIL) nebulizer solution 0.083% 2.5 mg/3mL  Every 4 Hours PRN         06/16/25 1338    06/16/25 1336  Target Arousal Level RASS -1 to -2  Continuous         06/16/25 1336    06/16/25 1333  propofol (DIPRIVAN) infusion 10 mg/mL 100 mL  As Needed,   Status:  Discontinued         06/16/25 1333    06/16/25 1333  Continue Indwelling Urinary Catheter  Once        Placed in \"And\" Linked Group    06/16/25 1336    06/16/25 1333  Assess Need for Indwelling Urinary Catheter - Follow Removal Protocol  Continuous        Comments: Indwelling Urinary Catheter Removal Criteria  Discontinue Indwelling Urinary Catheter Unless One of the Following is Present  Urinary Retention or Obstruction  Chronic Mccarthy Catheter Use  End of Life  Critical Illness with Strict I/O   Tract or Abdominal Surgery  Stage 3/4 Sacral / Perineal Wound  Required Activity Restriction: Trauma  Required Activity Restriction: Spine Surgery  If Patient is Being Followed by Urology Contact Them PRIOR to Removal  Do Not Remove Indwelling Urinary Catheter Order is Present with a CLINICAL REASON to Maintain the Catheter. Provider is Required to Include a Clinical Reason to Maintain a Urinary Catheter    Chronic " "Mccarthy Catheter Use (Present on Admission)  Assess for Continued Need & Document Medical Necessity  If Infection is Suspected, Contact the Provider       Placed in \"And\" Linked Group    06/16/25 1336    06/16/25 1333  Urinary Catheter Care  Every Shift      Placed in \"And\" Linked Group    06/16/25 1336    06/16/25 1316  Blood Gas, Arterial With Co-Ox  PROCEDURE ONCE         06/16/25 1313    06/16/25 1316  Calcium, Ionized  PROCEDURE ONCE         06/16/25 1314    06/16/25 1300  Assess Need for Indwelling Urinary Catheter - Follow Removal Protocol  Continuous,   Status:  Canceled        Comments: Follow Protocol As Outlined in Process Instructions (Open Order Report to View Full Instructions)    06/16/25 1259    06/16/25 1300  Urinary Catheter Care  Every Shift,   Status:  Canceled       06/16/25 1259    06/16/25 1300  Blood Gas, Arterial With Co-Ox  STAT         06/16/25 1259    06/16/25 1259  Protime-INR  STAT         06/16/25 1258    06/16/25 1258  Renal Function Panel  STAT         06/16/25 1258    06/16/25 1258  Fibrinogen  STAT         06/16/25 1258    06/16/25 1258  CBC & Differential  STAT         06/16/25 1258    06/16/25 1258  CBC Auto Differential  PROCEDURE ONCE         06/16/25 1258    06/16/25 1257  XR Chest 1 View  1 Time Imaging         06/16/25 1258    06/16/25 1257  ECG 12 Lead Other; post op  STAT         06/16/25 1258    06/16/25 1256  Inpatient Admission  Once         06/16/25 1255    06/16/25 1215  Transfuse Pheresed Platelets  Status:  Canceled       06/16/25 1215    06/16/25 1202  OR Blood Pickup  Once,   Status:  Canceled         06/16/25 1201    06/16/25 1158  Arterial Blood Gas with Coox and Lactate  PROCEDURE ONCE         06/16/25 1156    06/16/25 1151  CBC (No Diff)  RELEASE UPON ORDERING         06/16/25 1151    06/16/25 1151  Fibrinogen  RELEASE UPON ORDERING         06/16/25 1151    06/16/25 1151  Protime-INR  RELEASE UPON ORDERING         06/16/25 1151    06/16/25 1059  Arterial Blood " Gas with Coox and Lactate  PROCEDURE ONCE         06/16/25 1057    06/16/25 1019  Arterial Blood Gas with Coox and Lactate  PROCEDURE ONCE         06/16/25 1018    06/16/25 0957  Tissue Pathology Exam  RELEASE UPON ORDERING         06/16/25 0957    06/16/25 0907  Arterial Blood Gas with Coox and Lactate  PROCEDURE ONCE         06/16/25 0903    06/16/25 0900  sodium chloride 0.9 % flush 10 mL  Every 12 Hours Scheduled,   Status:  Discontinued         06/16/25 0545    06/16/25 0900  sodium chloride 0.9 % flush 3 mL  Every 12 Hours Scheduled,   Status:  Discontinued         06/16/25 0627    06/16/25 0830  sterile water irrigation solution  As Needed,   Status:  Discontinued         06/16/25 0830    06/16/25 0830  thrombin topical  As Needed,   Status:  Discontinued         06/16/25 0830    06/16/25 0829  sodium chloride 1,000 mL with heparin (porcine) 1,000 Units mixture  As Needed,   Status:  Discontinued         06/16/25 0829    06/16/25 0829  gelatin absorbable (GELFOAM) powder  As Needed,   Status:  Discontinued         06/16/25 0829    06/16/25 0829  sodium chloride (NS) irrigation solution  As Needed,   Status:  Discontinued         06/16/25 0830    06/16/25 0828  bupivacaine-EPINEPHrine PF 20 mL, bupivacaine liposome 20 mL mixture  As Needed,   Status:  Discontinued         06/16/25 0828    06/16/25 0828  sodium chloride 500 mL with papaverine 2 mL mixture  As Needed,   Status:  Discontinued         06/16/25 0829    06/16/25 0827  sodium chloride 1,000 mL with vancomycin 1,000 mg irrigation  As Needed,   Status:  Discontinued         06/16/25 0828    06/16/25 0802  Arterial Blood Gas with Coox and Lactate  PROCEDURE ONCE         06/16/25 0800    06/16/25 0748  Insert Indwelling Urinary Catheter  Once        Comments: Follow Protocol As Outlined in Process Instructions (Open Order Report to View Full Instructions)    06/16/25 0748    06/16/25 0646  OR Blood Pickup  Once,   Status:  Canceled         06/16/25 0645     06/16/25 0633  Intra-Op TAVR Transesophageal Echo  Once         06/16/25 0633 06/16/25 0629  lactated ringers infusion  Continuous,   Status:  Discontinued         06/16/25 0627 06/16/25 0629  Midazolam HCl (PF) (VERSED) injection 2 mg  Once         06/16/25 0627 06/16/25 0628  Oxygen Therapy- Nasal Cannula; Titrate 1-6 LPM Per SpO2; 90 - 95%  Continuous,   Status:  Canceled         06/16/25 0627 06/16/25 0628  Continuous Pulse Oximetry  Continuous,   Status:  Canceled         06/16/25 0627 06/16/25 0628  Insert Peripheral IV  Once         06/16/25 0627 06/16/25 0628  Saline Lock & Maintain IV Access  Continuous,   Status:  Canceled         06/16/25 0627 06/16/25 0628  Oxygen Therapy- Nasal Cannula; Titrate 1-6 LPM Per SpO2; 90 - 95%  Continuous,   Status:  Canceled         06/16/25 0627 06/16/25 0627  Vital Signs - Per Anesthesia Protocol  As Needed,   Status:  Canceled       06/16/25 0627 06/16/25 0627  POC Glucose PRN  As Needed,   Status:  Canceled      Comments: Notify Anesthesiologist if Blood Sugar Greater Than 200      06/16/25 0627 06/16/25 0627  sodium chloride 0.9 % flush 3-10 mL  As Needed,   Status:  Discontinued         06/16/25 0627 06/16/25 0627  Midazolam HCl (PF) (VERSED) injection 0.5 mg  Every 10 Minutes PRN,   Status:  Discontinued         06/16/25 0627 06/16/25 0548  lactated ringers infusion 1,000 mL  Continuous,   Status:  Discontinued         06/16/25 0546 06/16/25 0548  lactated ringers infusion 1,000 mL  Continuous,   Status:  Discontinued         06/16/25 0546    06/16/25 0547  acetaminophen (TYLENOL) tablet 1,000 mg  Once         06/16/25 0545    06/16/25 0547  mupirocin (BACTROBAN) 2 % nasal ointment  Once         06/16/25 0545    06/16/25 0547  ceFAZolin 2000 mg IVPB in 100 mL NS (MBP)  Once         06/16/25 0545    06/16/25 0547  Insert Peripheral IV  Once         06/16/25 0546    06/16/25 0547  Maintain IV Access  Continuous,   Status:   Canceled         06/16/25 0546    06/16/25 0546  sodium chloride 0.9 % flush 10 mL  As Needed,   Status:  Discontinued         06/16/25 0546    06/16/25 0546  Follow Anesthesia Guidelines / Protocol  Once         06/16/25 0545    06/16/25 0546  STS Risk Score has been calculated and discussed with the patient and family  Continuous         06/16/25 0545    06/16/25 0546  Verify NPO Status  Continuous         06/16/25 0545    06/16/25 0546  Place Sequential Compression Device  Once         06/16/25 0545    06/16/25 0546  Chlorhexidine Shower / Bath After Skin Prep  Once        Comments: Chlorhexidine Skin Prep and Instructions For All Patients Having A Procedure Requiring an Outward Incision if Not Allergic.  If Allergic, Give Antibacterial Skin Wipes and Instructions.  Do Not Use For Facial Cases or on Any Mucus Membranes.    06/16/25 0545    06/16/25 0546  Clip Hair Chin to Ankles  Once         06/16/25 0545    06/16/25 0546  Additional Pre-Op Care Order / Instruction  Continuous         06/16/25 0545    06/16/25 0546  Notify Physician - Standard  Until Discontinued         06/16/25 0545    06/16/25 0546  Prepare RBC, 2 Units  Blood - Once         06/16/25 0545    06/16/25 0546  Prepare Platelet Pheresis, 1 Units  Blood - Once         06/16/25 0545    06/16/25 0546  Prepare Fresh Frozen Plasma, 2 Units  Blood - Once,   Status:  Canceled         06/16/25 0545    06/16/25 0546  Insert Peripheral IV x2  Once         06/16/25 0545    06/16/25 0546  Saline Lock & Maintain IV Access  Continuous,   Status:  Canceled         06/16/25 0545    06/16/25 0546  Insert Peripheral IV  Once         06/16/25 0546    06/16/25 0546  Maintain IV Access  Continuous,   Status:  Canceled         06/16/25 0546    06/16/25 0545  lidocaine PF 1% (XYLOCAINE) injection 0.5 mL  Once As Needed,   Status:  Discontinued         06/16/25 0546    06/16/25 0545  sodium chloride 0.9 % flush 3 mL  As Needed,   Status:  Discontinued         06/16/25  0546    06/16/25 0545  sodium chloride 0.9 % flush 10 mL  As Needed,   Status:  Discontinued         06/16/25 0545    06/16/25 0545  sodium chloride 0.9 % infusion 40 mL  As Needed,   Status:  Discontinued         06/16/25 0545    06/16/25 0545  sodium chloride 0.9 % flush 30 mL  Once As Needed,   Status:  Discontinued         06/16/25 0545    06/16/25 0545  sodium chloride 0.9 % infusion  Continuous PRN,   Status:  Discontinued         06/16/25 0545    06/16/25 0545  dextrose (GLUTOSE) oral gel 15 g  Every 15 Minutes PRN,   Status:  Discontinued         06/16/25 0545    06/16/25 0545  dextrose (D50W) (25 g/50 mL) IV injection 10-50 mL  Every 15 Minutes PRN,   Status:  Discontinued         06/16/25 0545    06/16/25 0545  glucagon (GLUCAGEN) injection 1 mg  Every 15 Minutes PRN,   Status:  Discontinued         06/16/25 0545    Unscheduled  Check Peripheral Pulses As Needed  As Needed       06/16/25 1338    Unscheduled  Cardiac Output Parameters PRN Until 24 Hours Post-Op  As Needed       06/16/25 1338    Unscheduled  Insert Nasogastric Tube If Indicated & Not Already in Place  As Needed      Comments: Indications: Nausea, Vomiting, Prolonged Intubation or to Administer Medications  Attach to Low Wall Suction if Any Residual    06/16/25 1338    Unscheduled  Wound Care  As Needed       06/16/25 1338    Unscheduled  Dangle at Bedside After Extubation  As Needed       06/16/25 1338    Unscheduled  Up in Chair As Tolerated After Extubation  As Needed       06/16/25 1338    Unscheduled  Oxygen Therapy- Nasal Cannula; 2 LPM  Continuous PRN       06/16/25 1338    Unscheduled  Blood Gas, Arterial -  As Needed      Comments: Obtain ABG as needed for ventilator changes      06/16/25 1338    Unscheduled  Begin Weaning When Criteria Met:  As Needed      Comments: If all of the weaning criteria are met, Respiratory Therapist is to initiate spontaneous breathing trial with PSV pressure support mode with PEEP 5 cmH2O if <90kg, PEEP  8 cmH2O if > or = 90kg. If criteria are not met, RT or nurse is to re-evaluate in 15-30 minutes.    06/16/25 1338    Unscheduled  Blood Gas, Arterial -  As Needed      Comments: Prior to Extubation      06/16/25 1338    Unscheduled  Oxygen Therapy- Nasal Cannula; Titrate 1-6 LPM Per SpO2; 90 - 95%  Continuous PRN       06/16/25 1338    Unscheduled  CBC & Differential  As Needed        Comments: Chest Tube Drainage Greater Than 100mL/hr      06/16/25 1338    Unscheduled  aPTT  As Needed        Comments: Chest Tube Drainage Greater Than 100mL/hr      06/16/25 1338    Unscheduled  Protime-INR  As Needed        Comments: Chest Tube Drainage Greater Than 100mL/hr      06/16/25 1338    Unscheduled  Fibrin Split Products  As Needed        Comments: Chest Tube Drainage Greater Than 100mL/hr      06/16/25 1338    Unscheduled  Fibrinogen  As Needed        Comments: Chest Tube Drainage Greater Than 100mL/hr      06/16/25 1338    Unscheduled  Treat Hypoglycemia As Recommended By Glucommander™ & Notify Provider of Treatment  As Needed      Comments: Follow Hypoglycemia Orders As Outlined in Process Instructions (Open Order Report to View Full Instructions)  Notify Provider Any Time Hypoglycemia Treatment is Administered    06/16/25 1338    Unscheduled  If Insulin Infusion is Paused - Follow Glucommander Instructions  As Needed       06/16/25 1338    Unscheduled  POC Glucose PRN  As Needed      Comments: Glucommander recommended POC testing will vary between 15 minutes and 2 hours.      06/16/25 1338                  Ventilator/Non-Invasive Ventilation Settings (From admission, onward)       Start     Ordered    06/16/25 1338  Ventilator - Vent Mode: Alternate (Custom) Mode: See Comments  (Initial Vent Settings upon arrival to ICU - PAD)  Continuous,   Status:  Canceled        Comments: Initial Mechanical Ventilation Settings upon Arrival to the ICU: SIMV, Tidal Volume 5-7 mL/kg ideal body weight based on height formula,  Respiratory Rate 14-16/min, PEEP 5 cmH2O if <90kg, PEEP 8 cmH2O if >90kg, FiO2 60%.   Question:  Vent Mode  Answer:  Alternate (Custom) Mode: See Comments    06/16/25 1338    06/16/25 1338  Ventilator - Vent Mode: Alternate (Custom) Mode: See Comments  (Within 30 Minutes Arrival to ICU - PAD)  Continuous        Comments: RT adjusts Mechanical Ventilation as needed to achieve: pH 7.35-7.45, pCO2 34-45 mmHg, PaO2 > 90 mmHg, SpO2 > 92%.   Question:  Vent Mode  Answer:  Alternate (Custom) Mode: See Comments    06/16/25 1338                     Operative/Procedure Notes (all)        Andrew Mcneil MD at 06/16/25 0817  Version 1 of 1         AORTIC VALVE REPAIR/REPLACEMENT, CORONARY ARTERY BYPASS GRAFTING, MAZE PROCEDURE, ATRIAL APPENDAGE EXCLUSION LEFT WITH TRANSESOPHAGEAL ECHOCARDIOGRAM  Progress Note    Linden Montelongo  6/16/2025    Pre-op Diagnosis:   Coronary artery disease involving native coronary artery of native heart without angina pectoris [I25.10]  Aortic valve stenosis [I35.0]       Post-Op Diagnosis Codes:     * Coronary artery disease involving native coronary artery of native heart without angina pectoris [I25.10]     * Aortic valve stenosis [I35.0]    Procedure(s):      Procedure(s):  AORTIC VALVE REPLACEMENT 23MM, CORONARY ARTERY BYPASS GRAFTING X2, LEFT INTERNAL MAMMARY ARTERY GRAFT, RIGHT LEG ENDOSCOPIC VEIN HARVEST,  MAZE WITH ENCOMPASS CLAMP, CRYOABLATION, LEFT ATRIAL APPENDAGE EXCLUSION 40MM, TRANSESOPHAGEAL ECHOCARDIOGRAM  CORONARY ARTERY BYPASS GRAFTING  MAZE PROCEDURE WITH ENCOMPASS CLAMP  ATRIAL APPENDAGE EXCLUSION LEFT WITH TRANSESOPHAGEAL ECHOCARDIOGRAM              Surgeon(s):  Andrew Mcneil MD    Anesthesia: General    Staff:   Circulator: Dania De La Rosa RN  Perfusionist: Lorenza Amaya CCP  Scrub Person: , April; Bina Oliva  Assistant: Loulou Gomez  Other: Mami Staley RN  Assistant: Loulou Gomez    Estimated Blood Loss: NA    Urine Voided: * No values recorded  between 6/16/2025  7:07 AM and 6/16/2025 12:52 PM *    Specimens:                Specimens       ID Source Type Tests Collected By Collected At Frozen?    1 Blood, Arterial Line Blood CBC (NO DIFF)  FIBRINOGEN  PROTIME-INR   Andrew Mcneil MD 6/16/25 1151     A Aortic Leaflet Tissue TISSUE PATHOLOGY EXAM   Andrew Mcneil MD 6/16/25 0957 No              Drains:   Chest Tube Left Pleural (Active)       Urethral Catheter Temperature probe 16 Fr. (Active)       Y Chest Tube 1 and 2 1 Right Mediastinal 24 Fr. 2 Left Mediastinal 24 Fr. (Active)       Findings: CABG x2 (LIMA to LAD, SVG to Diagonal), AVR with 23mm Inspiris, Limited Left Atrial MAZE with LAAE (40mm Atriclip)      Complications: None Immediate    Assistant: Loulou Gomez  was responsible for performing the following activities: Retraction, Suction, Suturing, Closing, Placing Dressing, and Harvesting of Vessels and their skilled assistance was necessary for the success of this case.    Andrew Mcneil MD     Date: 6/16/2025  Time: 13:36 CDT          Electronically signed by Andrew Mcneil MD at 06/16/25 1337       Physician Progress Notes (last 24 hours)  Notes from 06/15/25 1828 through 06/16/25 1828   No notes of this type exist for this encounter.       Consult Notes (last 24 hours)  Notes from 06/15/25 1828 through 06/16/25 1828   No notes of this type exist for this encounter.

## 2025-06-16 NOTE — ANESTHESIA PROCEDURE NOTES
Airway  Reason: elective    Date/Time: 6/16/2025 7:15 AM  Airway not difficult    General Information and Staff    Patient location during procedure: OR  CRNA/CAA: Sean Gomez CRNA    Indications and Patient Condition  Indications for airway management: airway protection    Preoxygenated: yes  MILS maintained throughout    Mask difficulty assessment: 1 - vent by mask    Final Airway Details    Final airway type: endotracheal airway      Successful airway: ETT  Cuffed: yes   Successful intubation technique: direct laryngoscopy  Endotracheal tube insertion site: oral  Blade: Iglesias  Blade size: 2  ETT size (mm): 8.0  Cormack-Lehane Classification: grade I - full view of glottis  Placement verified by: chest auscultation and capnometry   Cuff volume (mL): 8  Measured from: lips  ETT/EBT  to lips (cm): 23  Number of attempts at approach: 1  Assessment: lips, teeth, and gum same as pre-op and atraumatic intubation

## 2025-06-16 NOTE — PROGRESS NOTES
RT EQUIPMENT DEVICE RELATED - SKIN ASSESSMENT    Albaro Score:        RT Medical Equipment/Device:     ETT Gasca/Anchorfast    Skin Assessment:      Cheek:  Intact  Neck:  Intact  Lips:  Intact  Mouth:  Intact    Device Skin Pressure Protection:  Skin-to-device areas padded:  Anchorfast    Nurse Notification:  Nicky Ayers, RRT

## 2025-06-17 ENCOUNTER — APPOINTMENT (OUTPATIENT)
Dept: GENERAL RADIOLOGY | Facility: HOSPITAL | Age: 80
End: 2025-06-17
Payer: MEDICARE

## 2025-06-17 LAB
ALBUMIN SERPL-MCNC: 3.5 G/DL (ref 3.5–5.2)
ANION GAP SERPL CALCULATED.3IONS-SCNC: 10 MMOL/L (ref 5–15)
BASOPHILS # BLD AUTO: 0.02 10*3/MM3 (ref 0–0.2)
BASOPHILS NFR BLD AUTO: 0.2 % (ref 0–1.5)
BH BB BLOOD EXPIRATION DATE: NORMAL
BH BB BLOOD TYPE BARCODE: 6200
BH BB DISPENSE STATUS: NORMAL
BH BB PRODUCT CODE: NORMAL
BH BB UNIT NUMBER: NORMAL
BUN SERPL-MCNC: 12.7 MG/DL (ref 8–23)
BUN/CREAT SERPL: 15.5 (ref 7–25)
CALCIUM SPEC-SCNC: 8.4 MG/DL (ref 8.6–10.5)
CHLORIDE SERPL-SCNC: 109 MMOL/L (ref 98–107)
CO2 SERPL-SCNC: 21 MMOL/L (ref 22–29)
CREAT SERPL-MCNC: 0.82 MG/DL (ref 0.76–1.27)
CROSSMATCH INTERPRETATION: NORMAL
CROSSMATCH INTERPRETATION: NORMAL
DEPRECATED RDW RBC AUTO: 52.1 FL (ref 37–54)
EGFRCR SERPLBLD CKD-EPI 2021: 88.8 ML/MIN/1.73
EOSINOPHIL # BLD AUTO: 0.01 10*3/MM3 (ref 0–0.4)
EOSINOPHIL NFR BLD AUTO: 0.1 % (ref 0.3–6.2)
ERYTHROCYTE [DISTWIDTH] IN BLOOD BY AUTOMATED COUNT: 15.7 % (ref 12.3–15.4)
GLUCOSE BLDC GLUCOMTR-MCNC: 139 MG/DL (ref 70–130)
GLUCOSE BLDC GLUCOMTR-MCNC: 140 MG/DL (ref 70–130)
GLUCOSE BLDC GLUCOMTR-MCNC: 143 MG/DL (ref 70–130)
GLUCOSE BLDC GLUCOMTR-MCNC: 148 MG/DL (ref 70–130)
GLUCOSE BLDC GLUCOMTR-MCNC: 153 MG/DL (ref 70–130)
GLUCOSE BLDC GLUCOMTR-MCNC: 159 MG/DL (ref 70–130)
GLUCOSE BLDC GLUCOMTR-MCNC: 159 MG/DL (ref 70–130)
GLUCOSE SERPL-MCNC: 157 MG/DL (ref 65–99)
HCT VFR BLD AUTO: 28.7 % (ref 37.5–51)
HGB BLD-MCNC: 9.4 G/DL (ref 13–17.7)
IMM GRANULOCYTES # BLD AUTO: 0.03 10*3/MM3 (ref 0–0.05)
IMM GRANULOCYTES NFR BLD AUTO: 0.3 % (ref 0–0.5)
INR PPP: 1.19 (ref 0.91–1.09)
LYMPHOCYTES # BLD AUTO: 0.53 10*3/MM3 (ref 0.7–3.1)
LYMPHOCYTES NFR BLD AUTO: 5.6 % (ref 19.6–45.3)
MAGNESIUM SERPL-MCNC: 2.1 MG/DL (ref 1.6–2.4)
MCH RBC QN AUTO: 29.9 PG (ref 26.6–33)
MCHC RBC AUTO-ENTMCNC: 32.8 G/DL (ref 31.5–35.7)
MCV RBC AUTO: 91.4 FL (ref 79–97)
MONOCYTES # BLD AUTO: 0.63 10*3/MM3 (ref 0.1–0.9)
MONOCYTES NFR BLD AUTO: 6.6 % (ref 5–12)
NEUTROPHILS NFR BLD AUTO: 8.31 10*3/MM3 (ref 1.7–7)
NEUTROPHILS NFR BLD AUTO: 87.2 % (ref 42.7–76)
NRBC BLD AUTO-RTO: 0 /100 WBC (ref 0–0.2)
PHOSPHATE SERPL-MCNC: 3.6 MG/DL (ref 2.5–4.5)
PLATELET # BLD AUTO: 126 10*3/MM3 (ref 140–450)
PMV BLD AUTO: 10.5 FL (ref 6–12)
POTASSIUM SERPL-SCNC: 4.3 MMOL/L (ref 3.5–5.2)
PROTHROMBIN TIME: 15.8 SECONDS (ref 11.8–14.8)
RBC # BLD AUTO: 3.14 10*6/MM3 (ref 4.14–5.8)
SODIUM SERPL-SCNC: 140 MMOL/L (ref 136–145)
UNIT  ABO: NORMAL
UNIT  RH: NORMAL
WBC NRBC COR # BLD AUTO: 9.53 10*3/MM3 (ref 3.4–10.8)

## 2025-06-17 PROCEDURE — 25010000002 ALBUMIN HUMAN 5% PER 50 ML: Performed by: SURGERY

## 2025-06-17 PROCEDURE — P9041 ALBUMIN (HUMAN),5%, 50ML: HCPCS | Performed by: SURGERY

## 2025-06-17 PROCEDURE — 94664 DEMO&/EVAL PT USE INHALER: CPT

## 2025-06-17 PROCEDURE — 25010000002 VANCOMYCIN 1.25-0.9 GM/250ML-% SOLUTION: Performed by: SURGERY

## 2025-06-17 PROCEDURE — 25010000002 ENOXAPARIN PER 10 MG: Performed by: SURGERY

## 2025-06-17 PROCEDURE — 83735 ASSAY OF MAGNESIUM: CPT | Performed by: SURGERY

## 2025-06-17 PROCEDURE — 25010000002 NICARDIPINE IN NACL 20-0.86 MG/200ML-% SOLUTION: Performed by: SURGERY

## 2025-06-17 PROCEDURE — 94799 UNLISTED PULMONARY SVC/PX: CPT

## 2025-06-17 PROCEDURE — 80069 RENAL FUNCTION PANEL: CPT | Performed by: SURGERY

## 2025-06-17 PROCEDURE — 82948 REAGENT STRIP/BLOOD GLUCOSE: CPT

## 2025-06-17 PROCEDURE — 97162 PT EVAL MOD COMPLEX 30 MIN: CPT

## 2025-06-17 PROCEDURE — 93005 ELECTROCARDIOGRAM TRACING: CPT | Performed by: SURGERY

## 2025-06-17 PROCEDURE — 25010000002 VANCOMYCIN 10 G RECONSTITUTED SOLUTION: Performed by: SURGERY

## 2025-06-17 PROCEDURE — 97116 GAIT TRAINING THERAPY: CPT

## 2025-06-17 PROCEDURE — 93010 ELECTROCARDIOGRAM REPORT: CPT | Performed by: INTERNAL MEDICINE

## 2025-06-17 PROCEDURE — 85610 PROTHROMBIN TIME: CPT | Performed by: SURGERY

## 2025-06-17 PROCEDURE — 94761 N-INVAS EAR/PLS OXIMETRY MLT: CPT

## 2025-06-17 PROCEDURE — 85025 COMPLETE CBC W/AUTO DIFF WBC: CPT | Performed by: SURGERY

## 2025-06-17 PROCEDURE — 25810000003 SODIUM CHLORIDE 0.9 % SOLUTION: Performed by: SURGERY

## 2025-06-17 PROCEDURE — 71045 X-RAY EXAM CHEST 1 VIEW: CPT

## 2025-06-17 RX ORDER — DEXTROSE MONOHYDRATE 25 G/50ML
25 INJECTION, SOLUTION INTRAVENOUS
Status: DISCONTINUED | OUTPATIENT
Start: 2025-06-17 | End: 2025-06-29 | Stop reason: HOSPADM

## 2025-06-17 RX ORDER — INSULIN LISPRO 100 [IU]/ML
2-7 INJECTION, SOLUTION INTRAVENOUS; SUBCUTANEOUS
Status: DISCONTINUED | OUTPATIENT
Start: 2025-06-17 | End: 2025-06-29 | Stop reason: HOSPADM

## 2025-06-17 RX ORDER — NICOTINE POLACRILEX 4 MG
15 LOZENGE BUCCAL
Status: DISCONTINUED | OUTPATIENT
Start: 2025-06-17 | End: 2025-06-29 | Stop reason: HOSPADM

## 2025-06-17 RX ORDER — IBUPROFEN 600 MG/1
1 TABLET ORAL
Status: DISCONTINUED | OUTPATIENT
Start: 2025-06-17 | End: 2025-06-29 | Stop reason: HOSPADM

## 2025-06-17 RX ORDER — FAMOTIDINE 20 MG/1
20 TABLET, FILM COATED ORAL
Status: DISCONTINUED | OUTPATIENT
Start: 2025-06-17 | End: 2025-06-29 | Stop reason: HOSPADM

## 2025-06-17 RX ADMIN — FAMOTIDINE 20 MG: 20 TABLET, FILM COATED ORAL at 08:08

## 2025-06-17 RX ADMIN — CHLORHEXIDINE GLUCONATE 1 APPLICATION: 500 CLOTH TOPICAL at 03:15

## 2025-06-17 RX ADMIN — OXYCODONE HYDROCHLORIDE 10 MG: 10 TABLET ORAL at 02:17

## 2025-06-17 RX ADMIN — CITALOPRAM 5 MG: 20 TABLET, FILM COATED ORAL at 08:08

## 2025-06-17 RX ADMIN — ACETAMINOPHEN 650 MG: 325 TABLET ORAL at 05:08

## 2025-06-17 RX ADMIN — VANCOMYCIN HYDROCHLORIDE 1250 MG: 10 INJECTION, POWDER, LYOPHILIZED, FOR SOLUTION INTRAVENOUS at 17:09

## 2025-06-17 RX ADMIN — CALCIUM CHLORIDE 1000 MG: 100 INJECTION INTRAVENOUS; INTRAVENTRICULAR at 08:42

## 2025-06-17 RX ADMIN — CLOPIDOGREL BISULFATE 75 MG: 75 TABLET ORAL at 17:08

## 2025-06-17 RX ADMIN — ENOXAPARIN SODIUM 40 MG: 100 INJECTION SUBCUTANEOUS at 08:08

## 2025-06-17 RX ADMIN — OXYCODONE HYDROCHLORIDE 10 MG: 10 TABLET ORAL at 08:08

## 2025-06-17 RX ADMIN — ATORVASTATIN CALCIUM 20 MG: 10 TABLET, FILM COATED ORAL at 21:09

## 2025-06-17 RX ADMIN — POLYETHYLENE GLYCOL 3350 17 G: 17 POWDER, FOR SOLUTION ORAL at 08:09

## 2025-06-17 RX ADMIN — IPRATROPIUM BROMIDE AND ALBUTEROL SULFATE 3 ML: 2.5; .5 SOLUTION RESPIRATORY (INHALATION) at 06:30

## 2025-06-17 RX ADMIN — ACETAMINOPHEN 650 MG: 325 TABLET ORAL at 11:44

## 2025-06-17 RX ADMIN — ACETAMINOPHEN 650 MG: 325 TABLET ORAL at 17:08

## 2025-06-17 RX ADMIN — METOPROLOL TARTRATE 12.5 MG: 25 TABLET, FILM COATED ORAL at 08:08

## 2025-06-17 RX ADMIN — OXYCODONE HYDROCHLORIDE 10 MG: 10 TABLET ORAL at 13:38

## 2025-06-17 RX ADMIN — AMIODARONE HYDROCHLORIDE 200 MG: 200 TABLET ORAL at 08:08

## 2025-06-17 RX ADMIN — Medication 1250 MG: at 05:08

## 2025-06-17 RX ADMIN — METHOCARBAMOL TABLETS 500 MG: 500 TABLET, COATED ORAL at 05:08

## 2025-06-17 RX ADMIN — METHOCARBAMOL TABLETS 500 MG: 500 TABLET, COATED ORAL at 13:38

## 2025-06-17 RX ADMIN — OXYCODONE HYDROCHLORIDE 10 MG: 10 TABLET ORAL at 21:41

## 2025-06-17 RX ADMIN — METHOCARBAMOL TABLETS 500 MG: 500 TABLET, COATED ORAL at 21:09

## 2025-06-17 RX ADMIN — ALBUMIN (HUMAN) 250 ML: 12.5 INJECTION, SOLUTION INTRAVENOUS at 04:42

## 2025-06-17 RX ADMIN — NICARDIPINE HYDROCHLORIDE 5 MG/HR: 0.1 INJECTION, SOLUTION INTRAVENOUS at 21:33

## 2025-06-17 RX ADMIN — ASPIRIN 162 MG: 81 TABLET, CHEWABLE ORAL at 08:08

## 2025-06-17 RX ADMIN — OXYCODONE HYDROCHLORIDE 10 MG: 10 TABLET ORAL at 17:10

## 2025-06-17 RX ADMIN — Medication 1 APPLICATION: at 17:09

## 2025-06-17 RX ADMIN — BISACODYL 10 MG: 5 TABLET, COATED ORAL at 21:09

## 2025-06-17 RX ADMIN — Medication 1 APPLICATION: at 05:08

## 2025-06-17 RX ADMIN — FAMOTIDINE 20 MG: 20 TABLET, FILM COATED ORAL at 17:08

## 2025-06-17 RX ADMIN — BISACODYL 10 MG: 5 TABLET, COATED ORAL at 08:08

## 2025-06-17 RX ADMIN — METOPROLOL TARTRATE 12.5 MG: 25 TABLET, FILM COATED ORAL at 21:09

## 2025-06-17 NOTE — PLAN OF CARE
Goal Outcome Evaluation:  Plan of Care Reviewed With: patient           Outcome Evaluation: PT eval completed. Pt up in recliner, AOx4 with complaints of pain along L sided of chest And rates at 7/10. Pt reports independence at home and assist as needed from significant other. Pt pre vitals; 67 HR, 94 SpO2, 19 RR, 104/62 BP. Pt educated on pursed lip breathing and sternal precuations prior to mobility and verbalized understanding. Pt performed sit<>stand with Casimiro and tolerated a total of 60' Of ambulation. During gait, pt reported worsening dizziness, SOA and lightheadedness and asked to return to recliner. Once sitting in recliner, BP checked with reading remaining WFL, 120/65. Pt left sitting in recliner and educated on progression of activity due to denying further mobility due to pain. Pt post vitals; 76 HR, 93% SpO2 remaining on 2L, 22 RR, 137/73 BP. Pt will benefit from skilled PT services to improve activity tolerance, decrease fall risk, stair negotiation, gait safety and maintaining sternal precautions. Anticipate d/c home with assist when medically stable and PT will continue to progress as tolerated.    Anticipated Discharge Disposition (PT): home with assist

## 2025-06-17 NOTE — THERAPY TREATMENT NOTE
Patient Name: Linden Montelongo  : 1945    MRN: [id number]                              Today's Date: 2025       Admit Date: 2025    Visit Dx:     ICD-10-CM ICD-9-CM   1. Impaired mobility [Z74.09]  Z74.09 799.89   2. Coronary artery disease involving native coronary artery of native heart without angina pectoris  I25.10 414.01   3. Aortic valve stenosis  I35.0 424.1     Patient Active Problem List   Diagnosis    Coronary artery disease involving native coronary artery of native heart without angina pectoris    Aortic valve stenosis    Nonrheumatic aortic (valve) stenosis     Past Medical History:   Diagnosis Date    A-fib     Hyperlipidemia     Hypertension     LUKE on CPAP     Sleep apnea      Past Surgical History:   Procedure Laterality Date    AORTIC VALVE REPAIR/REPLACEMENT N/A 2025    Procedure: AORTIC VALVE REPLACEMENT 23MM, CORONARY ARTERY BYPASS GRAFTING X2, LEFT INTERNAL MAMMARY ARTERY GRAFT, RIGHT LEG ENDOSCOPIC VEIN HARVEST,  MAZE WITH ENCOMPASS CLAMP, CRYOABLATION, LEFT ATRIAL APPENDAGE EXCLUSION 40MM, TRANSESOPHAGEAL ECHOCARDIOGRAM;  Surgeon: Andrew Mcneil MD;  Location:  [facility];  Service: Cardiothoracic;  Laterality: N/A;    APPENDECTOMY      ATRIAL APPENDAGE EXCLUSION LEFT WITH TRANSESOPHAGEAL ECHOCARDIOGRAM N/A 2025    Procedure: ATRIAL APPENDAGE EXCLUSION LEFT WITH TRANSESOPHAGEAL ECHOCARDIOGRAM;  Surgeon: Andrew Mcneil MD;  Location:  [facility];  Service: Cardiothoracic;  Laterality: N/A;    CORONARY ARTERY BYPASS GRAFT N/A 2025    Procedure: CORONARY ARTERY BYPASS GRAFTING;  Surgeon: Andrew Mcneil MD;  Location:  [facility];  Service: Cardiothoracic;  Laterality: N/A;    ESOPHAGUS SURGERY      MAZE PROCEDURE N/A 2025    Procedure: MAZE PROCEDURE WITH ENCOMPASS CLAMP;  Surgeon: Andrew Mcneil MD;  Location:  [facility];  Service: Cardiothoracic;  Laterality: N/A;    REPLACEMENT TOTAL KNEE Right     TOTAL HIP ARTHROPLASTY Right       General Information       Row  Name 06/17/25 1315 06/17/25 0800       Physical Therapy Time and Intention    Document Type therapy note (daily note)  - evaluation  pt presents with severe CAD and aortic valve stenosis resulting in CABG x2 and aortic valve replacement on 6/16  -    Mode of Treatment physical therapy  -AJ physical therapy  -AJ      Row Name 06/17/25 1315 06/17/25 0800       General Information    Patient Profile Reviewed -- yes  -AJ    Prior Level of Function -- independent:;all household mobility;community mobility;gait;home management;ADL's  -AJ    Existing Precautions/Restrictions fall;oxygen therapy device and L/min;sternal;other (see comments)  -AJ fall;oxygen therapy device and L/min;sternal;other (see comments)  2L  -AJ    Barriers to Rehab -- physical barrier;medically complex  -AJ      Row Name 06/17/25 0800          Living Environment    Current Living Arrangements home  -AJ     People in Home alone;other (see comments);significant other   pt plans to return home with significant other at d/c  -AJ       Row Name 06/17/25 0800          Home Main Entrance    Number of Stairs, Main Entrance three  -AJ     Stair Railings, Main Entrance railing on left side (ascending)  -AJ       Row Name 06/17/25 0800          Stairs Within Home, Primary    Number of Stairs, Within Home, Primary none  -AJ       Row Name 06/17/25 1315 06/17/25 0800       Cognition    Orientation Status (Cognition) oriented x 4  -AJ oriented x 4  -AJ      Row Name 06/17/25 1315 06/17/25 0800       Safety Issues/Impairments Affecting Functional Mobility    Impairments Affecting Function (Mobility) balance;endurance/activity tolerance;shortness of breath;pain  -AJ balance;endurance/activity tolerance;shortness of breath;pain  -AJ              User Key  (r) = Recorded By, (t) = Taken By, (c) = Cosigned By      Initials Name Provider Type    Andrew Perez, TANGELA DPT Physical Therapist                   Mobility       Row Name 06/17/25 1315 06/17/25 0800        Bed Mobility    Comment, (Bed Mobility) reamined in chair upon arrival  -AJ in chair upon arrival  -AJ      Row Name 06/17/25 1315 06/17/25 0800       Bed-Chair Transfer    Bed-Chair [place] (Transfers) minimum assist (75% patient effort);1 person assist  -AJ minimum assist (75% patient effort);1 person assist;2 person assist  -AJ    Comment, (Bed-Chair Transfer) HHA and support with heart pillow  -AJ supported under L elbow and heart pillow  -AJ      Row Name 06/17/25 1315 06/17/25 0800       Sit-Stand Transfer    Sit-Stand [place] (Transfers) contact guard;1 person assist;1 person to manage equipment  -AJ minimum assist (75% patient effort);1 person assist  -AJ      Row Name 06/17/25 1315 06/17/25 0800       Gait/Stairs (Locomotion)    [place] Level (Gait) minimum assist (75% patient effort);1 person assist;2 person assist  -AJ minimum assist (75% patient effort);1 person assist;2 person assist  -AJ    Assistive Device (Gait) -- other (see comments)  supported under L elbow and heart pillow  -AJ    Patient was able to Ambulate yes  -AJ yes  -AJ    Distance in Feet (Gait) 220  -AJ 65  -AJ    Deviations/Abnormal Patterns (Gait) bilateral deviations;gait speed decreased;stride length decreased;base of support, narrow  -AJ bilateral deviations;gait speed decreased;stride length decreased;base of support, narrow  -AJ    Bilateral Gait Deviations forward flexed posture  -AJ forward flexed posture  -AJ              User Key  (r) = Recorded By, (t) = Taken By, (c) = Cosigned By      Initials Name Provider Type    Anderw Perez PT DPT Physical Therapist                   Obj/Interventions       Row Name 06/17/25 0800          Range of Motion Comprehensive    General Range of Motion bilateral lower extremity ROM WFL  -AJ       Row Name 06/17/25 0800          Strength Comprehensive (MMT)    General Manual Muscle Testing (MMT) Assessment no strength deficits identified  -SHAISTA     Comment, General Manual  Muscle Testing (MMT) Assessment Deferred MMT due to pain, remains function through sit<>stand and ambulation  -       Row Name 06/17/25 1315          Motor Skills    Motor Skills functional endurance  -AJ     Functional Endurance tolerated full lap of ambulation around unit but required frequent rest breaks  -       Row Name 06/17/25 1315 06/17/25 0800       Balance    Balance Assessment sitting static balance;sitting dynamic balance;standing static balance;standing dynamic balance  -AJ sitting static balance;sitting dynamic balance;standing static balance;standing dynamic balance  -AJ    Static Sitting Balance independent  -AJ independent  -AJ    Dynamic Sitting Balance independent  -AJ independent  -AJ    Position, Sitting Balance supported;sitting in chair  -AJ supported;sitting in chair  -AJ    Static Standing Balance minimal assist  -AJ minimal assist  -AJ    Dynamic Standing Balance minimal assist  -AJ minimal assist  -AJ    Position/Device Used, Standing Balance supported  -AJ supported  -AJ    Balance Interventions sitting;standing;sit to stand;static;supported;dynamic  -AJ sitting;standing;sit to stand;supported;static;dynamic  -AJ      Row Name 06/17/25 1315 06/17/25 0800       Sensory Assessment (Somatosensory)    Sensory Assessment (Somatosensory) sensation intact  - sensation intact  -              User Key  (r) = Recorded By, (t) = Taken By, (c) = Cosigned By      Initials Name Provider Type    Andrew Perez, PT DPT Physical Therapist                   Goals/Plan       Row Name 06/17/25 0800          Bed Mobility Goal 1 (PT)    Activity/Assistive Device (Bed Mobility Goal 1, PT) bed mobility activities, all;rolling to left;rolling to right;supine to sit;sit to supine  -AJ     [place] Level/Cues Needed (Bed Mobility Goal 1, PT) independent  -AJ     Time Frame (Bed Mobility Goal 1, PT) long term goal (LTG);10 days  -AJ     Strategies/Barriers (Bed Mobility Goal 1, PT) maintain sternal  precuations  -AJ     Progress/Outcomes (Bed Mobility Goal 1, PT) new goal  -       Row Name 06/17/25 0800          Transfer Goal 1 (PT)    Activity/Assistive Device (Transfer Goal 1, PT) sit-to-stand/stand-to-sit;bed-to-chair/chair-to-bed;toilet;wheelchair transfer;car transfer;other (see comments)  -AJ     [place] Level/Cues Needed (Transfer Goal 1, PT) standby assist;verbal cues required  -AJ     Time Frame (Transfer Goal 1, PT) long term goal (LTG);10 days  -AJ     Strategies/Barriers (Transfers Goal 1, PT) maintain sternal precuations  -AJ     Progress/Outcome (Transfer Goal 1, PT) new goal  -       Row Name 06/17/25 0800          Gait Training Goal 1 (PT)    Activity/Assistive Device (Gait Training Goal 1, PT) gait (walking locomotion);decrease asymmetrical patterns;decrease fall risk;diminish gait deviation;forward stepping;improve balance and speed;increase endurance/gait distance;increase energy conservation;normalize weight shifts  -AJ     [place] Level (Gait Training Goal 1, PT) standby assist;contact guard required  -AJ     Distance (Gait Training Goal 1, PT) 300' with pain under 5/10 and sternal precautions  -AJ     Time Frame (Gait Training Goal 1, PT) long term goal (LTG);10 days  -AJ     Progress/Outcome (Gait Training Goal 1, PT) new goal  -       Row Name 06/17/25 0800          Stairs Goal 1 (PT)    Activity/Assistive Device (Stairs Goal 1, PT) ascending stairs;descending stairs;decrease fall risk;step-to-step;improve balance and speed  -AJ     [place] Level/Cues Needed (Stairs Goal 1, PT) standby assist  -AJ     Number of Stairs (Stairs Goal 1, PT) 3 as needed for home safety  -AJ     Time Frame (Stairs Goal 1, PT) long term goal (LTG);10 days  -AJ     Progress/Outcome (Stairs Goal 1, PT) new goal  -       Row Name 06/17/25 0800          Therapy Assessment/Plan (PT)    Planned Therapy Interventions (PT) balance training;bed mobility training;gait training;home exercise  program;patient/family education;postural re-education;stair training;strengthening;stretching;transfer training;ROM (range of motion)  -AJ               User Key  (r) = Recorded By, (t) = Taken By, (c) = Cosigned By      Initials Name Provider Type    Andrew Perez, PT DPT Physical Therapist                   Clinical Impression       Row Name 06/17/25 1315 06/17/25 0800       Pain    Pretreatment Pain Rating 7/10  -AJ 7/10  -AJ    Posttreatment Pain Rating 6/10  -AJ 9/10  -AJ    Pain Location chest  -AJ chest  -AJ    Pain Side/Orientation generalized  -AJ left;generalized  -AJ    Pain Management Interventions exercise or physical activity utilized;activity modification encouraged;nursing notified;premedicated for activity  -AJ nursing notified;exercise or physical activity utilized;premedicated for activity  -AJ    Response to Pain Interventions activity participation with decreased pain;activity participation with tolerable pain  -AJ activity participation with increased pain  -AJ      Row Name 06/17/25 1315 06/17/25 0800       Plan of Care Review    Plan of Care Reviewed With patient  -AJ patient  -AJ    Progress improving  -AJ --    Outcome Evaluation PT treatment complete. Pt remains in recliner and reports he feels overall improvement with pain and decreased dizziness while at rest. Pre vitals: HR 58, SpO2 94 on 2L, 19 RR and 118/66 BP. Pt required verbal cueing for sternal restricitions. Pt stood with CGA, maintained balance and ambulated lap around unit of 220. Pt gait improved with verbal cues to increase stride length which pt felt more comfortable with. Pt also required frequent rest breaks due to fatigue but no sitting breaks were required. Pt returned to room and left in recliner with family at bedside. Pt tolerated afternoon session well and POG ongoing. Post vitals HR 73, SpO2 95 on 2L, 25 RR, 158/71 BP. PT will continue to follow.  -AJ PT eval completed. Pt up in recliner, AOx4 with complaints of  pain along L sided of chest And rates at 7/10. Pt reports independence at home and assist as needed from significant other. Pt pre vitals; 67 HR, 94 SpO2, 19 RR, 104/62 BP. Pt educated on pursed lip breathing and sternal precuations prior to mobility and verbalized understanding. Pt performed sit<>stand with Casimiro and tolerated a total of 60' Of ambulation. During gait, pt reported worsening dizziness, SOA and lightheadedness and asked to return to recliner. Once sitting in recliner, BP checked with reading remaining WFL, 120/65. Pt left sitting in recliner and educated on progression of activity due to denying further mobility due to pain. Pt post vitals; 76 HR, 93% SpO2 remaining on 2L, 22 RR, 137/73 BP. Pt will benefit from skilled PT services to improve activity tolerance, decrease fall risk, stair negotiation, gait safety and maintaining sternal precautions. Anticipate d/c home with assist when medically stable and PT will continue to progress as tolerated.  -      Row Name 06/17/25 0800          Therapy Assessment/Plan (PT)    Patient/Family Therapy Goals Statement (PT) get well and go home  -     Criteria for Skilled Interventions Met (PT) yes;meets criteria;skilled treatment is necessary  -     Therapy Frequency (PT) 2 times/day  -AJ     Predicted Duration of Therapy Intervention (PT) until d/c  -       Row Name 06/17/25 1315 06/17/25 0800       Vital Signs    Pre Systolic BP Rehab 118  -  -AJ    Pre Treatment Diastolic BP 66  -AJ 62  -AJ    Intra Systolic BP Rehab -- 120  -AJ    Intra Treatment Diastolic BP -- 65  -AJ    Post Systolic BP Rehab 158  -  -AJ    Post Treatment Diastolic BP 71  -AJ 73  -AJ    Pretreatment Heart Rate (beats/min) 58  -AJ 67  -AJ    Posttreatment Heart Rate (beats/min) 73  -AJ 76  -AJ    Pretreatment Resp Rate (breaths/min) 19  -AJ 19  -AJ    Posttreatment Resp Rate (breaths/min) 25  -AJ 22  -AJ    Pre SpO2 (%) 94  -AJ 94  -AJ    O2 Delivery Pre Treatment  supplemental O2  2L  -AJ supplemental O2  2L  -AJ    Post SpO2 (%) 95  -AJ 93  -AJ    O2 Delivery Post Treatment supplemental O2  2L  -AJ supplemental O2  2L  -AJ    Pre Patient Position -- Sitting  -AJ    Intra Patient Position -- Standing  -AJ    Post Patient Position -- Sitting  -AJ      Row Name 06/17/25 1315 06/17/25 0800       Positioning and Restraints    Pre-Treatment Position sitting in chair/recliner  -AJ sitting in chair/recliner  -AJ    Post Treatment Position chair  -AJ chair  -AJ    In Chair notified nsg;with nsg;call light within reach;encouraged to call for assist;with family/caregiver  -AJ notified nsg;reclined;sitting;call light within reach;encouraged to call for assist;waffle cushion;legs elevated;patient within staff view  -AJ              User Key  (r) = Recorded By, (t) = Taken By, (c) = Cosigned By      Initials Name Provider Type    Andrew Perez, PT DPT Physical Therapist                   Outcome Measures       Row Name 06/17/25 1315 06/17/25 0808       How much help from another person do you currently need...    Turning from your back to your side while in flat bed without using bedrails? 3  -AJ 3  -MM    Moving from lying on back to sitting on the side of a flat bed without bedrails? 3  -AJ 2  -MM    Moving to and from a bed to a chair (including a wheelchair)? 3  -AJ 2  -MM    Standing up from a chair using your arms (e.g., wheelchair, bedside chair)? 3  -AJ 3  -MM    Climbing 3-5 steps with a railing? 2  -AJ 2  -MM    To walk in hospital room? 3  -AJ 3  -MM    AM-PAC 6 Clicks Score (PT) 17  -AJ 15  -MM    Highest Level of Mobility Goal Stand (1 or More Minutes)-5  -AJ Move to Chair/Commode-4  -MM      Row Name 06/17/25 0800 06/17/25 0500       How much help from another person do you currently need...    Turning from your back to your side while in flat bed without using bedrails? 3  -AJ 2  -SE    Moving from lying on back to sitting on the side of a flat bed without bedrails? 3   -AJ 2  -SE    Moving to and from a bed to a chair (including a wheelchair)? 3  -AJ 2  -SE    Standing up from a chair using your arms (e.g., wheelchair, bedside chair)? 3  -AJ 3  -SE    Climbing 3-5 steps with a railing? 3  -AJ 2  -SE    To walk in hospital room? 3  -AJ 2  -SE    AM-PAC 6 Clicks Score (PT) 18  -AJ 13  -SE    Highest Level of Mobility Goal Walk 10 Steps or More-6  -AJ Move to Chair/Commode-4  -SE      Row Name 06/17/25 1315 06/17/25 0800       Functional Assessment    Outcome Measure Options AM-PAC 6 Clicks Basic Mobility (PT)  -AJ AM-PAC 6 Clicks Basic Mobility (PT)  -              User Key  (r) = Recorded By, (t) = Taken By, (c) = Cosigned By      Initials Name Provider Type    David Atkinson, RN Registered Nurse    Henny Wilks RN Registered Nurse    Andrew Perez, PT DPT Physical Therapist                                 Physical Therapy Education       Title: PT OT SLP Therapies (In Progress)       Topic: Physical Therapy (In Progress)       Point: Mobility training (In Progress)       Learning Progress Summary            Patient Acceptance, E, NR by SHAISTA at 6/17/2025 1438    Comment: continued sternal precautions, goal progression    Acceptance, E, VU by SHAISTA at 6/17/2025 1011    Comment: role of PT, sternal precvautions, progressive mobility and ambuakltion                      Point: Home exercise program (In Progress)       Learning Progress Summary            Patient Acceptance, E, NR by SHAISTA at 6/17/2025 1438    Comment: continued sternal precautions, goal progression    Acceptance, E, VU by SHAISTA at 6/17/2025 1011    Comment: role of PT, sternal precvautions, progressive mobility and ambuakltion                      Point: Body mechanics (In Progress)       Learning Progress Summary            Patient Acceptance, E, NR by SHAISTA at 6/17/2025 1438    Comment: continued sternal precautions, goal progression    Acceptance, E, VU by SHAISTA at 6/17/2025 1011    Comment: role of PT, sternal  precvautions, progressive mobility and ambuakltion                      Point: Precautions (In Progress)       Learning Progress Summary            Patient Acceptance, E, NR by SHAISTA at 6/17/2025 1438    Comment: continued sternal precautions, goal progression    Acceptance, E, VU by SHAISTA at 6/17/2025 1011    Comment: role of PT, sternal precvautions, progressive mobility and ambuakltion                                      User Key       Initials Effective Dates Name Provider Type Discipline     08/15/24 -  Andrew Palomino, PT DPT Physical Therapist PT                  PT Recommendation and Plan  Planned Therapy Interventions (PT): balance training, bed mobility training, gait training, home exercise program, patient/family education, postural re-education, stair training, strengthening, stretching, transfer training, ROM (range of motion)  Progress: improving  Outcome Evaluation: PT treatment complete. Pt remains in recliner and reports he feels overall improvement with pain and decreased dizziness while at rest. Pre vitals: HR 58, SpO2 94 on 2L, 19 RR and 118/66 BP. Pt required verbal cueing for sternal restricitions. Pt stood with CGA, maintained balance and ambulated lap around unit of 220. Pt gait improved with verbal cues to increase stride length which pt felt more comfortable with. Pt also required frequent rest breaks due to fatigue but no sitting breaks were required. Pt returned to room and left in recliner with family at bedside. Pt tolerated afternoon session well and POG ongoing. Post vitals HR 73, SpO2 95 on 2L, 25 RR, 158/71 BP. PT will continue to follow.     Time Calculation:         PT Charges       Row Name 06/17/25 1315 06/17/25 0800          Time Calculation    Start Time 1315  -AJ 0800  -     Stop Time 1340  - 0839  -     Time Calculation (min) 25 min  -AJ 39 min  -     PT Received On 06/17/25  -AJ 06/17/25  -SHAISTA     PT Goal Re-Cert Due Date -- 06/27/25  -        Timed Charges    58307 -  Gait Training Minutes  25  -AJ --        Untimed Charges    PT Eval/Re-eval Minutes -- 39  -AJ        Total Minutes    Timed Charges Total Minutes 25  -AJ --     Untimed Charges Total Minutes -- 39  -AJ      Total Minutes 25  -AJ 39  -AJ               User Key  (r) = Recorded By, (t) = Taken By, (c) = Cosigned By      Initials Name Provider Type    Andrew Perez, PT DPT Physical Therapist                  Therapy Charges for Today       Code Description Service Date Service Provider Modifiers Qty    10831309754 HC PT EVAL MOD COMPLEXITY 3 6/17/2025 Andrew Palomino, PT DPT GP 1    66851158117 HC GAIT TRAINING EA 15 MIN 6/17/2025 Andrew Palomino, PT DPT GP 2            PT G-Codes  Outcome Measure Options: AM-PAC 6 Clicks Basic Mobility (PT)  AM-PAC 6 Clicks Score (PT): 17  PT Discharge Summary  Anticipated Discharge Disposition (PT): home with assist    Andrew Palomino PT [id number]  6/17/2025

## 2025-06-17 NOTE — CASE MANAGEMENT/SOCIAL WORK
Discharge Planning Assessment  [facility]     Patient Name: Linden Montelongo  MRN: [id number]  Today's Date: 6/17/2025    Admit Date: 6/16/2025        Discharge Needs Assessment       Row Name 06/17/25 1023       Living Environment    People in Home alone    Current Living Arrangements home    Potentially Unsafe Housing Conditions none    In the past 12 months has the electric, gas, oil, or water company threatened to shut off services in your home? No    Primary Care Provided by self    Provides Primary Care For no one    Family Caregiver if Needed child(gordon), adult;significant other    Family Caregiver Names ShadeSummer (Significant Other)  [phone] (Mobile)    Quality of Family Relationships helpful;involved;supportive    Able to Return to Prior Arrangements yes       Resource/Environmental Concerns    Resource/Environmental Concerns none    Transportation Concerns none       Transportation Needs    In the past 12 months, has lack of transportation kept you from medical appointments or from getting medications? no    In the past 12 months, has lack of transportation kept you from meetings, work, or from getting things needed for daily living? No       Food Insecurity    Within the past 12 months, you worried that your food would run out before you got the money to buy more. Never true    Within the past 12 months, the food you bought just didn't last and you didn't have money to get more. Never true       Transition Planning    Patient/Family Anticipates Transition to home with family    Patient/Family Anticipated Services at Transition none    Transportation Anticipated family or friend will provide       Discharge Needs Assessment    Equipment Currently Used at Home none    Concerns to be Addressed discharge planning    Do you want help finding or keeping work or a job? I do not need or want help    Do you want help with school or training? For example, starting or completing job training or getting a high school  diploma, GED or equivalent No    Anticipated Changes Related to Illness none    Equipment Needed After Discharge none    Discharge Coordination/Progress Lives alone but significant other will be staying with him during his recovery. No DME used. Has established medical care. Denies any social needs today. CM/SS will be following during recovery and will be available to assist with any needs or discharge planning.                   Discharge Plan    No documentation.                      Demographic Summary    No documentation.                  Functional Status    No documentation.                  Psychosocial    No documentation.                  Abuse/Neglect    No documentation.                  Legal    No documentation.                  Substance Abuse    No documentation.                  Patient Forms    No documentation.                     Deyanira Rogers RN

## 2025-06-17 NOTE — PROGRESS NOTES
"Patient name: Linden Montelongo  Patient : 1945  VISIT # [id number]  MR #[id number]    Procedure:Procedure(s):  AORTIC VALVE REPLACEMENT 23MM, CORONARY ARTERY BYPASS GRAFTING X2, LEFT INTERNAL MAMMARY ARTERY GRAFT, RIGHT LEG ENDOSCOPIC VEIN HARVEST,  MAZE WITH ENCOMPASS CLAMP, CRYOABLATION, LEFT ATRIAL APPENDAGE EXCLUSION 40MM, TRANSESOPHAGEAL ECHOCARDIOGRAM  CORONARY ARTERY BYPASS GRAFTING  MAZE PROCEDURE WITH ENCOMPASS CLAMP  ATRIAL APPENDAGE EXCLUSION LEFT WITH TRANSESOPHAGEAL ECHOCARDIOGRAM  Procedure Date:2025  POD:1 Day Post-Op    Subjective     Extubated and on 2 L nasal cannula.  Weight is up 9 pounds from baseline.  Due to walk with physical therapy.  Hemodynamically stable.      Telemetry: Sinus rhythm 70s  IV drips: Insulin         Objective     Visit Vitals  /55   Pulse 67   Temp 98.6 °F (37 °C) (Oral)   Resp 18   Ht 177 cm (69.69\")   Wt 112 kg (247 lb 12.8 oz)   SpO2 97%   BMI 35.88 kg/m²   Baseline weight 238 pounds    Intake/Output Summary (Last 24 hours) at 2025 0806  Last data filed at 2025 0745  Gross per 24 hour   Intake 7588.6 ml   Output 2500 ml   Net 5088.6 ml     MCT: 590 mL since surgery, serosanguineous, no airleak  Left pleural drain: 240 mL since surgery, serosanguineous, no airleak    Lab:     CBC:  Results from last 7 days   Lab Units 25  0314 25  1709 25  1318   WBC 10*3/mm3 9.53 11.59* 9.19   HEMATOCRIT % 28.7* 30.3* 30.0*   PLATELETS 10*3/mm3 126* 123* 111*          BMP:  Results from last 7 days   Lab Units 25  0314 25  1709 25  1318   SODIUM mmol/L 140 140 141   POTASSIUM mmol/L 4.3 4.7 4.4   CHLORIDE mmol/L 109* 109* 110*   CO2 mmol/L 21.0* 21.0* 21.0*   GLUCOSE mg/dL 157* 169* 126*   BUN mg/dL 12.7 14.4 14.4   CREATININE mg/dL 0.82 0.84 0.83          COAG:  Results from last 7 days   Lab Units 25  0314 25  1318   INR  1.19* 1.27*   APTT seconds  --  44.1*       IMAGES:       Imaging Results (Last 24 Hours)  "      Procedure Component Value Units Date/Time    XR Chest 1 View [[id number]] Collected: 06/17/25 0657     Updated: 06/17/25 0702    Narrative:      EXAM: XR CHEST 1 VW-      DATE: 6/17/2025 2:10 AM     HISTORY: Post-Op Heart Surgery       COMPARISON: 6/16/2025.     TECHNIQUE:  Single view. Frontal view of the chest. 1 images.       FINDINGS:    Interval extubation. LEFT central venous catheter sheath, tip projects  at midline. Interval removal of pulmonary artery catheter. Similar LEFT  chest and mediastinal drains.     No measurable pneumothorax by radiograph. Small streaky LEFT basilar  opacity and possible small fluid. No RIGHT consolidation or large  pleural effusion.     Similar cardiac mediastinal silhouette. LEFT atrial appendage occlusion  device. Sternotomy wires.          Impression:      1. Small streaky LEFT basilar opacity and probable small fluid.  2. Interval extubation and removal of pulmonary artery catheter. Other  lines and tubes as above.     This report was signed and finalized on 6/17/2025 6:59 AM by Dr Dottie Ryan MD.       XR Chest 1 View [[id number]] Collected: 06/16/25 1322     Updated: 06/16/25 1327    Narrative:      XR CHEST 1 VW-     HISTORY: post cabg and avr; I25.10-Atherosclerotic heart disease of  native coronary artery without angina pectoris; I35.0-Nonrheumatic  aortic (valve) stenosis     COMPARISON: 6/13/2025     FINDINGS: Frontal view the chest obtained.     Endotracheal tube appropriate in position at the superior plate of T4. A  left subclavian Richardson-Claudia catheter in place with the tip in the right  main pulmonary artery. Mediastinal surgical changes with prosthetic  coronary valve, left atrial appendage clip, and mediastinal as well as  left thoracotomy drains. Small left pleural effusion. No appreciable  pneumothorax. Left basal atelectasis. Similar granulomatous  calcifications.       Impression:      1. Mediastinal surgical changes prosthetic coronary valve and  left  atrial appendage clip. Mediastinal and left thoracotomy drains. Small a  pleural effusion with no appreciable pneumothorax. Left basilar  atelectasis. Appropriate positioning of the endotracheal and left  subclavian [place]-Claudia catheters.        This report was signed and finalized on 6/16/2025 1:24 PM by Dr. Kayla Yang MD.             CXR: Chest tube in stable position with no pneumothorax.  Left basilar atelectasis.    Physical Exam:  General: Alert, oriented. No apparent distress.   Cardiovascular: Regular rate and rhythm without murmur, rubs, or gallops.    Pulmonary: Clear to auscultation bilaterally without wheezing, rubs, or rales.  Chest: Sternotomy incision clean, dry, and intact. Sternum stable. No clicks. Chest tubes to 20 cm suction. No air leak. Fluid is serosanguineous  Abdomen: Soft, nondistended, and nontender.  Extremities: Warm, moves all extremities. No edema. Saphenectomy site clean, dry, and intact with Ace wrap  Neurologic:  Grossly intact with no focal deficits.            Impression:  Aortic valve stenosis  Coronary artery disease  Paroxysmal atrial fibrillation  Hyperlipidemia  Hypertension      Plan:  Begin bowel regimen  Calcium chloride 1g IV x 1 now  Routine postcardiac surgery care  Encourage pulmonary toilet ambulation  Keep chest tubes today  Reassess later today for possible transfer to   Discussed with patient and nursing      JUDE Urena  06/17/25  08:06 CDT

## 2025-06-17 NOTE — PLAN OF CARE
Goal Outcome Evaluation:  Plan of Care Reviewed With: patient        Progress: improving  Outcome Evaluation: PT treatment complete. Pt remains in recliner and reports he feels overall improvement with pain and decreased dizziness while at rest. Pre vitals: HR 58, SpO2 94 on 2L, 19 RR and 118/66 BP. Pt required verbal cueing for sternal restricitions. Pt stood with CGA, maintained balance and ambulated lap around unit of 220. Pt gait improved with verbal cues to increase stride length which pt felt more comfortable with. Pt also required frequent rest breaks due to fatigue but no sitting breaks were required. Pt returned to room and left in recliner with family at bedside. Pt tolerated afternoon session well and POG ongoing. Post vitals HR 73, SpO2 95 on 2L, 25 RR, 158/71 BP. PT will continue to follow.    Anticipated Discharge Disposition (PT): home with assist

## 2025-06-17 NOTE — OP NOTE
Cardiac Surgery Operative Note     Date of Procedure:  6/17/2025     Preoperative Diagnosis:   Severe Aortic Valve Stenosis  Severe CAD, LAD and Diagonal  Hypertension  Hyperlipidemia  LUKE  Obese, BMI 35.9  Paroxysmal Atrial Fibrillation     Postoperative Diagnosis:  Same     Procedures Performed:  1. Aortic Valve Replacement with Stented Valve  2. Coronary Bypass Grafting x1, 1 Arterial Graft, 1 venous graft  3. Limited Left Atrial MAZE, LAAE, 40mm Atriclip  4.  Placement of nontunneled central venous catheter, left subclavian; placement of pulmonary arterial catheter     Procedure Summary: AVR (23mm Inspiris), CABG x3 (LIMA to LAD, SVG to Diagonal Artery), Lifted Left Atrial MAZE with Encompss, LAAE with 40mm Atriclip     Surgeon:  Andrew Mcneil MD  Assistants:  Loulou Gomez CFA  Anesthesia Staff:  Arlin Staley MD  Anesthesia Type:  General     Estimated Blood Loss:  Minimal (Cell Saver)     Drains:  1. 24 [place] Tigre drains x2-anterior and posterior mediastinum  2. 24 [place] Tigre drain - Left Pleural Space     Specimens:  Aortic Valve Leaflets     Operative Indications:   Mr. Montelongo is an 79-year-old male who presented to me with severe symptomatic aortic stenosis and severe LAD stenosis right at the takeoff of a diagonal branch.  He had class III NYHA heart failure symptoms.  He also had a history of paroxysmal atrial fibrillation.  With this he was referred to me for consideration of SAVR and CABG.  He had normal LV function.  I discussed with him the risk and benefits of proceeding he understood and agreed to proceed.    Operative Findings:   Tricuspid aortic valve with eavily calcification and limited mobility of all 3 leaflets, worse calcification on the non-coronary  Excised and debrided to clean annulus and replaced with 23 mm Inspiris valve     Excellent LIMA quality, okay vein quality. LAD at site of grafting measured 2 mm in size with mild atherosclerotic disease burden, grafted using LIMA.   Diagonal artery measured 1.4 mm in size at site of grafting, grafted using SVG.     Limited left atrial maze with encompass clamp, cryoablation of Coumadin ridge, PHILLIP excluded using 40mm AtriClip     Transesophageal echocardiography findings include normal left ventricular function with normal wall motion at beginning of case.  Well seated aortic valve without paravalvular leak at the end of the case with normal LV function and normal wall motion.      Total aortic cross-clamp time: 85 minutes  Total cardiac bypass time: 135 minutes     Operative description in detail:  The patient was taken to the operative suite where the patient was placed in a supine position.  Induction of general anesthesia and placement of a single-lumen endotracheal tube was performed without remark.  Appropriate arterial and venous access was established without remark.  Anesthesia attempted placement of IJ central venous line catheter and was unable to easily advance wire.  Given this I placed a left subclavian, area was prepped and draped in normal sterile fashion and vein accessed with 1 attempt.  Wire was passed and there was atrial ectopy consistent with appropriate wire placement.  MAC catheter was advanced over the wire without difficulty.  I then floated the [place] using PA waveform pressure tracing into appropriate position.  [place]-Claudia catheter was secured in place and central venous line was sutured into place.  Dressing was placed.  The patient was then prepped and draped in the usual and sterile surgical fashion.  A timeout was performed.  Perioperative antibiotics were administered.  Beta blocker was given.     A simultaneous team approach was used to harvest internal mammary artery as well as the right saphenous vein.  Saphenous vein harvested in standard endoscopic fashion.  Side branches were controlled with a combination of suture and clips.  The leg wounds were hemostatic and were closed in anatomic layers using absorbable  suture.      Median sternotomy was performed in standard fashion. Hemostasis was ensured along sternal edges.  A Rultract device was used to expose the posterior sternal table.  The left internal mammary artery was taken down using a standard pedicle technique with a combination of electrocautery and clips to control all side branches.  At that time, the patient was systemically anticoagulated with IV heparin.  A 24 [place] Tigre drain was placed in the left pleural space.  After suitable time of circulating heparin, clips were placed doubly onto the mammary arteries distally and they were divided proximal to the previously placed clips.  It had excellent arterial inflow.  The mammary artery harvest beds were hemostatic.  The Rultract device was removed from the sterile field and a sternal retractor was used for exposure.      Midline mediastinal fat and thymus were divided and pericardium opened.  A pericardial well was created.  The distal ascending aorta and right atrial appendage were cannulated for cardiopulmonary bypass in standard fashion.  Aortic line was tested and was satisfactory.  A combined cardioplegia/aortic root vent set was secured with a horizontal mattress 4-0 Prolene suture.  A retrograde cardioplegia catheter was placed through the right atrial free wall into the coronary sinus. With an appropriate ACT cardiopulmonary bypass was commenced. I dissected out diagonal artery and LAD for suitable bypass sites.       I then proceeded with encompass left atrial maze.  Oblique sinus was opened and a tunnel was created behind the SVC.  I attempted to flat past the encompass clamp but unfortunately the lower jaw made atriotomy into the left atrium.  This was removed and then was able to pass through the oblique sinus as planned.  2 ablations were performed at 3 different locations.  Ligament Maury was divided.  I performed a cryoablation across Coumadin ridge for 2 minutes.  Left atrial appendage was  measured and a 40 mm atrial clip was placed in standard fashion.  I then identified the site of bleeding along Sondergaard's groove and close the left atrium with pledgeted 4-0 Prolene suture.  I allowed the heart to fill and there was excellent hemostasis at this location.     With that, I proceeded to apply the aortic cross-clamp and administered cardioplegia utilizing standard cardioplegia.  This was given in an antegrade and retrograde fashion. During administration, the LV did distend once arrest occurred. Cardioplegia was given every 15 minutes during arrested portion of case.  There was no ventricular distention and total dose was given.     I then directed my attention to the diagonal artery.  Coronary arteriotomy was made and augmented to size with Christine scissors.  The saphenous vein graft was prepared, it was anastomosed in end-to-side fashion using a running 7-0 Prolene suture.  Anastomosis was assessed for hemostasis which was excellent.     I then turned my attention to the aortic valve.  Transverse aortotomy was made just below the aortic fat pad and extended towards the pulmonary artery and into the noncoronary cusp in a hockey-stick shape.  The aortic valve was assessed and was per above findings.  The aortic valve was excised and debrided to a clean annulus.  Annulus and LVOT were copiously irrigated.  The aortic annulus was sized and 23 mm valve was determined to be appropriate valve size.  Inspiris valve was opened.  Annular sutures with pledgets were placed around the annulus in horizontal mattress fashion with pledgets on the ventricular side.  Sutures were placed through the sewing cuff of the prosthetic aortic valve and it was parachuted into annulus without difficulty.  The sutures were all tied using the Cor-Knot device.  Valve was inspected, it was seated well, both coronary ostia were visible and widely patent above the valve.  The aorta was closed in two layers using running 4-0 prolene  suture from each corner.     I then measured the vein graft with heart full for proximal anastomosis.  Aortotomy was made and augmented to size using a 4 mm punch.  Proximal anastomosis was made in an end-to-side fashion using a running 6-0 Prolene suture.  Hemostasis was excellent and the aortic root was de-aired while tying the suture.      I then turned my attention to the LAD.  The LIMA was prepared.  LAD arteriotomy was made and extended with Christine scissors.  An end-to-side anastomosis was created using a running 7-0 Prolene suture.  Anastomosis was checked for hemostasis which was excellent and had excellent flow. I did tack the mammary pedicle to the epicardial fat.     With that being accomplished, a terminal hotshot was administered.  The patient was placed in Trendelenburg position.  Upon completion of terminal hotshot and placement of temporary epicardial pacing wires, with the aortic vent on high and pump flows diminished, the aortic cross-clamp was released.  With that, full support was implemented.  A nonworking beating phase was implemented. Ventilation was restored.  With all in readiness, the heart was allowed to fill and then weaned off cardiopulmonary bypass. We removed the aortic root vent/cardioplegia cannula set.  Its associated pursestring suture was tied securely and this was reinforced as per matter of routine.  The table was now placed in neutral position.  I decannulated the venous line and snared down its associated pursestring suture.  Systemic intravenous protamine was administered.  All associated blood volume was returned to the patient. With continued good hemodynamics, I decannulated the arterial line and tied down its associated pursestring sutures.  Aortic cannulation site was reinforced as per routine.  At this time I tied down the previously snared pursestring suture at venous cannulation site.  The mediastinum was drained with 24 [place] Tigre drains placed anteriorly and  posteriorly.  I surveyed the chest and hemostasis was excellent.  The sternum was reapproximated with stainless steel wires placed in an interrupted fashion.  In layers anatomically the soft tissue planes were reapproximated. Instruments, sharps, and sponge counts were reported as correct.     Complications: None     Disposition: Transferred to ICU in stable and guarded condition.     Andrew Mcneil M.D.  Cardiothoracic Surgeon

## 2025-06-17 NOTE — PLAN OF CARE
Problem: Adult Inpatient Plan of Care  Goal: Plan of Care Review  Outcome: Progressing  Goal: Patient-Specific Goal (Individualized)  Outcome: Progressing  Goal: Absence of Hospital-Acquired Illness or Injury  Outcome: Progressing  Intervention: Identify and Manage Fall Risk  Recent Flowsheet Documentation  Taken 6/17/2025 1800 by Henny Iglesias RN  Safety Promotion/Fall Prevention: safety round/check completed  Taken 6/17/2025 1708 by Henny Iglesias RN  Safety Promotion/Fall Prevention: safety round/check completed  Taken 6/17/2025 1600 by Henny Iglesias RN  Safety Promotion/Fall Prevention: safety round/check completed  Taken 6/17/2025 1500 by Henny Iglesias RN  Safety Promotion/Fall Prevention: safety round/check completed  Taken 6/17/2025 1338 by Henny Iglesias RN  Safety Promotion/Fall Prevention: safety round/check completed  Taken 6/17/2025 1300 by Henny Iglesias RN  Safety Promotion/Fall Prevention: safety round/check completed  Taken 6/17/2025 1144 by Henny Iglesias RN  Safety Promotion/Fall Prevention: safety round/check completed  Taken 6/17/2025 1100 by Henny Iglesias RN  Safety Promotion/Fall Prevention: safety round/check completed  Taken 6/17/2025 1000 by Henny Iglesias RN  Safety Promotion/Fall Prevention: safety round/check completed  Taken 6/17/2025 0900 by Henny Iglesias RN  Safety Promotion/Fall Prevention: safety round/check completed  Taken 6/17/2025 0808 by Henny Iglesias RN  Safety Promotion/Fall Prevention: safety round/check completed  Taken 6/17/2025 0700 by Henny Iglesias RN  Safety Promotion/Fall Prevention: safety round/check completed  Intervention: Prevent Skin Injury  Recent Flowsheet Documentation  Taken 6/17/2025 1708 by Henny Iglesias RN  Body Position: (up in chair)   weight shifting   tilted   left   other (see comments)  Taken 6/17/2025 1500 by Henny Iglesias RN  Body Position: (up in chair)   weight shifting   tilted   supine   other (see  comments)  Taken 6/17/2025 1300 by Henny Iglesias RN  Body Position: (up in chair)   weight shifting   tilted   right   other (see comments)  Taken 6/17/2025 1144 by Henny Iglesias RN  Skin Protection:   incontinence pads utilized   transparent dressing maintained  Taken 6/17/2025 1100 by Henny Iglesias RN  Body Position: (up in chair)   weight shifting   supine   other (see comments)  Taken 6/17/2025 0900 by Henny Iglesias RN  Body Position: (up in chair)   weight shifting   tilted   left   other (see comments)  Taken 6/17/2025 0808 by Henny Iglesias RN  Skin Protection: incontinence pads utilized  Taken 6/17/2025 0700 by Henny Iglesias RN  Body Position: (up in chair)   weight shifting   other (see comments)  Intervention: Prevent and Manage VTE (Venous Thromboembolism) Risk  Recent Flowsheet Documentation  Taken 6/17/2025 1600 by Henny Iglesias RN  VTE Prevention/Management:   bilateral   SCDs (sequential compression devices) on  Taken 6/17/2025 1144 by Henny Iglesias RN  VTE Prevention/Management:   left   SCDs (sequential compression devices) on  Taken 6/17/2025 0808 by Henny Iglesias RN  VTE Prevention/Management:   left   SCDs (sequential compression devices) on  Goal: Optimal Comfort and Wellbeing  Outcome: Progressing  Intervention: Monitor Pain and Promote Comfort  Recent Flowsheet Documentation  Taken 6/17/2025 1708 by Henny Iglesias RN  Pain Management Interventions: pain medication given  Taken 6/17/2025 1600 by Henny Iglesias RN  Pain Management Interventions:   pillow support provided   position adjusted  Taken 6/17/2025 1338 by Henny Iglesias RN  Pain Management Interventions: pain medication given  Taken 6/17/2025 1144 by Henny Iglesias RN  Pain Management Interventions: (tylenol)   pain medication given   other (see comments)  Taken 6/17/2025 0900 by Henny Iglesias RN  Pain Management Interventions:   pillow support provided   position adjusted   relaxation techniques  promoted   quiet environment facilitated  Taken 6/17/2025 0808 by Henny Iglesias RN  Pain Management Interventions: pain medication given  Intervention: Provide Person-Centered Care  Recent Flowsheet Documentation  Taken 6/17/2025 1600 by Henny Iglesias RN  Trust Relationship/Rapport:   care explained   choices provided  Taken 6/17/2025 1144 by Henny Iglesias RN  Trust Relationship/Rapport:   care explained   choices provided  Taken 6/17/2025 0808 by Henny Iglesias RN  Trust Relationship/Rapport:   care explained   choices provided  Goal: Readiness for Transition of Care  Outcome: Progressing  Intervention: Mutually Develop Transition Plan  Recent Flowsheet Documentation  Taken 6/17/2025 1047 by Henny Iglesias RN  Equipment Currently Used at Home: none  Taken 6/17/2025 1046 by Henny Iglesias RN  Equipment Currently Used at Home: none     Problem: Mechanical Ventilation Invasive  Goal: Effective Communication  Outcome: Progressing  Intervention: Ensure Effective Communication  Recent Flowsheet Documentation  Taken 6/17/2025 1600 by Henny Iglesias RN  Trust Relationship/Rapport:   care explained   choices provided  Diversional Activities: television  Taken 6/17/2025 1144 by Henny Iglesias RN  Trust Relationship/Rapport:   care explained   choices provided  Diversional Activities: television  Family/Support System Care: support provided  Taken 6/17/2025 0808 by Henny Iglesias RN  Trust Relationship/Rapport:   care explained   choices provided  Diversional Activities: television  Family/Support System Care: support provided  Goal: Optimal Device Function  Outcome: Progressing  Goal: Mechanical Ventilation Liberation  Outcome: Progressing  Intervention: Promote Extubation and Mechanical Ventilation Liberation  Recent Flowsheet Documentation  Taken 6/17/2025 1600 by Henny Iglesias RN  Medication Review/Management: medications reviewed  Goal: Absence of Device-Related Skin and Tissue Injury  Outcome:  Progressing  Intervention: Maintain Skin and Tissue Health  Recent Flowsheet Documentation  Taken 6/17/2025 1600 by Henny Iglesias RN  Device Skin Pressure Protection:   absorbent pad utilized/changed   adhesive use limited   pressure points protected   skin-to-device areas padded   skin-to-skin areas padded   tubing/devices free from skin contact  Taken 6/17/2025 1144 by Henny Iglesias RN  Device Skin Pressure Protection:   absorbent pad utilized/changed   adhesive use limited   skin-to-device areas padded   pressure points protected   skin-to-skin areas padded   tubing/devices free from skin contact  Taken 6/17/2025 0808 by Henny Iglesias RN  Device Skin Pressure Protection:   absorbent pad utilized/changed   adhesive use limited   skin-to-device areas padded   skin-to-skin areas padded   tubing/devices free from skin contact  Goal: Absence of Ventilator-Induced Lung Injury  Outcome: Progressing     Problem: Skin Injury Risk Increased  Goal: Skin Health and Integrity  Outcome: Progressing  Intervention: Optimize Skin Protection  Recent Flowsheet Documentation  Taken 6/17/2025 1600 by Henny Iglesias RN  Activity Management: up in chair  Pressure Reduction Techniques:   weight shift assistance provided   pressure points protected  Pressure Reduction Devices: pressure-redistributing mattress utilized  Taken 6/17/2025 1144 by Henny Iglesias RN  Activity Management: up in chair  Pressure Reduction Techniques:   weight shift assistance provided   pressure points protected  Pressure Reduction Devices: pressure-redistributing mattress utilized  Skin Protection:   incontinence pads utilized   transparent dressing maintained  Taken 6/17/2025 0808 by Henny Iglesias RN  Activity Management: up in chair  Pressure Reduction Techniques:   weight shift assistance provided   pressure points protected  Pressure Reduction Devices: pressure-redistributing mattress utilized  Skin Protection: incontinence pads utilized      Problem: Fall Injury Risk  Goal: Absence of Fall and Fall-Related Injury  Outcome: Progressing  Intervention: Identify and Manage Contributors  Recent Flowsheet Documentation  Taken 6/17/2025 1600 by Henny Iglesias RN  Medication Review/Management: medications reviewed  Intervention: Promote Injury-Free Environment  Recent Flowsheet Documentation  Taken 6/17/2025 1800 by Henny Iglesias RN  Safety Promotion/Fall Prevention: safety round/check completed  Taken 6/17/2025 1708 by Henny Iglesias RN  Safety Promotion/Fall Prevention: safety round/check completed  Taken 6/17/2025 1600 by Henny Iglesias RN  Safety Promotion/Fall Prevention: safety round/check completed  Taken 6/17/2025 1500 by Henny Iglesias RN  Safety Promotion/Fall Prevention: safety round/check completed  Taken 6/17/2025 1338 by Henny Iglesias RN  Safety Promotion/Fall Prevention: safety round/check completed  Taken 6/17/2025 1300 by Henny Iglesias RN  Safety Promotion/Fall Prevention: safety round/check completed  Taken 6/17/2025 1144 by Henny Iglesias RN  Safety Promotion/Fall Prevention: safety round/check completed  Taken 6/17/2025 1100 by Henny Iglesias RN  Safety Promotion/Fall Prevention: safety round/check completed  Taken 6/17/2025 1000 by Henny Iglesias RN  Safety Promotion/Fall Prevention: safety round/check completed  Taken 6/17/2025 0900 by Henny Iglesias RN  Safety Promotion/Fall Prevention: safety round/check completed  Taken 6/17/2025 0808 by Henny Iglesias RN  Safety Promotion/Fall Prevention: safety round/check completed  Taken 6/17/2025 0700 by Henny Iglesias RN  Safety Promotion/Fall Prevention: safety round/check completed   Goal Outcome Evaluation:

## 2025-06-17 NOTE — THERAPY EVALUATION
Patient Name: Linden Montelongo  : 1945    MRN: [id number]                              Today's Date: 2025       Admit Date: 2025    Visit Dx:     ICD-10-CM ICD-9-CM   1. Impaired mobility [Z74.09]  Z74.09 799.89   2. Coronary artery disease involving native coronary artery of native heart without angina pectoris  I25.10 414.01   3. Aortic valve stenosis  I35.0 424.1     Patient Active Problem List   Diagnosis    Coronary artery disease involving native coronary artery of native heart without angina pectoris    Aortic valve stenosis    Nonrheumatic aortic (valve) stenosis     Past Medical History:   Diagnosis Date    A-fib     Hyperlipidemia     Hypertension     LUKE on CPAP     Sleep apnea      Past Surgical History:   Procedure Laterality Date    AORTIC VALVE REPAIR/REPLACEMENT N/A 2025    Procedure: AORTIC VALVE REPLACEMENT 23MM, CORONARY ARTERY BYPASS GRAFTING X2, LEFT INTERNAL MAMMARY ARTERY GRAFT, RIGHT LEG ENDOSCOPIC VEIN HARVEST,  MAZE WITH ENCOMPASS CLAMP, CRYOABLATION, LEFT ATRIAL APPENDAGE EXCLUSION 40MM, TRANSESOPHAGEAL ECHOCARDIOGRAM;  Surgeon: Andrew Mcneil MD;  Location:  [facility];  Service: Cardiothoracic;  Laterality: N/A;    APPENDECTOMY      ATRIAL APPENDAGE EXCLUSION LEFT WITH TRANSESOPHAGEAL ECHOCARDIOGRAM N/A 2025    Procedure: ATRIAL APPENDAGE EXCLUSION LEFT WITH TRANSESOPHAGEAL ECHOCARDIOGRAM;  Surgeon: Andrew Mcneil MD;  Location:  [facility];  Service: Cardiothoracic;  Laterality: N/A;    CORONARY ARTERY BYPASS GRAFT N/A 2025    Procedure: CORONARY ARTERY BYPASS GRAFTING;  Surgeon: Andrew Mcneil MD;  Location:  [facility];  Service: Cardiothoracic;  Laterality: N/A;    ESOPHAGUS SURGERY      MAZE PROCEDURE N/A 2025    Procedure: MAZE PROCEDURE WITH ENCOMPASS CLAMP;  Surgeon: Andrew Mcneil MD;  Location:  [facility];  Service: Cardiothoracic;  Laterality: N/A;    REPLACEMENT TOTAL KNEE Right     TOTAL HIP ARTHROPLASTY Right       General Information       Row  Name 06/17/25 00          Physical Therapy Time and Intention    Document Type evaluation  pt presents with severe CAD and aortic valve stenosis resulting in CABG x2 and aortic valve replacement on 6/16  -     Mode of Treatment physical therapy  -       Row Name 06/17/25 0800          General Information    Patient Profile Reviewed yes  -AJ     Prior Level of Function independent:;all household mobility;community mobility;gait;home management;ADL's  -AJ     Existing Precautions/Restrictions fall;oxygen therapy device and L/min;sternal;other (see comments)  2L  -     Barriers to Rehab physical barrier;medically complex  -       Row Name 06/17/25 0800          Living Environment    Current Living Arrangements home  -     People in Home alone;other (see comments);significant other   pt plans to return home with significant other at d/c  -       Row Name 06/17/25 0800          Home Main Entrance    Number of Stairs, Main Entrance three  -AJ     Stair Railings, Main Entrance railing on left side (ascending)  -       Row Name 06/17/25 0800          Stairs Within Home, Primary    Number of Stairs, Within Home, Primary none  -       Row Name 06/17/25 0800          Cognition    Orientation Status (Cognition) oriented x 4  -       Row Name 06/17/25 0800          Safety Issues/Impairments Affecting Functional Mobility    Impairments Affecting Function (Mobility) balance;endurance/activity tolerance;shortness of breath;pain  -               User Key  (r) = Recorded By, (t) = Taken By, (c) = Cosigned By      Initials Name Provider Type    AJ Andrew Palomino, PT DPT Physical Therapist                   Mobility       Row Name 06/17/25 0800          Bed Mobility    Comment, (Bed Mobility) in chair upon arrival  -       Row Name 06/17/25 0800          Bed-Chair Transfer    Bed-Chair [place] (Transfers) minimum assist (75% patient effort);1 person assist;2 person assist  -     Comment, (Bed-Chair  Transfer) supported under L elbow and heart pillow  -AJ       Row Name 06/17/25 0800          Sit-Stand Transfer    Sit-Stand [place] (Transfers) minimum assist (75% patient effort);1 person assist  -       Row Name 06/17/25 0800          Gait/Stairs (Locomotion)    [place] Level (Gait) minimum assist (75% patient effort);1 person assist;2 person assist  -     Assistive Device (Gait) other (see comments)  supported under L elbow and heart pillow  -     Patient was able to Ambulate yes  -AJ     Distance in Feet (Gait) 65  -AJ     Deviations/Abnormal Patterns (Gait) bilateral deviations;gait speed decreased;stride length decreased;base of support, narrow  -AJ     Bilateral Gait Deviations forward flexed posture  -               User Key  (r) = Recorded By, (t) = Taken By, (c) = Cosigned By      Initials Name Provider Type    Andrew Perez PT DPT Physical Therapist                   Obj/Interventions       Row Name 06/17/25 0800          Range of Motion Comprehensive    General Range of Motion bilateral lower extremity ROM WFL  -       Row Name 06/17/25 0800          Strength Comprehensive (MMT)    General Manual Muscle Testing (MMT) Assessment no strength deficits identified  -     Comment, General Manual Muscle Testing (MMT) Assessment Deferred MMT due to pain, remains function through sit<>stand and ambulation  -       Row Name 06/17/25 0800          Balance    Balance Assessment sitting static balance;sitting dynamic balance;standing static balance;standing dynamic balance  -     Static Sitting Balance independent  -     Dynamic Sitting Balance independent  -     Position, Sitting Balance supported;sitting in chair  -     Static Standing Balance minimal assist  -     Dynamic Standing Balance minimal assist  -     Position/Device Used, Standing Balance supported  -     Balance Interventions sitting;standing;sit to stand;supported;static;dynamic  -       Row Name 06/17/25  0800          Sensory Assessment (Somatosensory)    Sensory Assessment (Somatosensory) sensation intact  -AJ               User Key  (r) = Recorded By, (t) = Taken By, (c) = Cosigned By      Initials Name Provider Type    Andrew Perez, PT DPT Physical Therapist                   Goals/Plan       Row Name 06/17/25 0800          Bed Mobility Goal 1 (PT)    Activity/Assistive Device (Bed Mobility Goal 1, PT) bed mobility activities, all;rolling to left;rolling to right;supine to sit;sit to supine  -AJ     [place] Level/Cues Needed (Bed Mobility Goal 1, PT) independent  -AJ     Time Frame (Bed Mobility Goal 1, PT) long term goal (LTG);10 days  -AJ     Strategies/Barriers (Bed Mobility Goal 1, PT) maintain sternal precuations  -AJ     Progress/Outcomes (Bed Mobility Goal 1, PT) new goal  -AJ       Row Name 06/17/25 0800          Transfer Goal 1 (PT)    Activity/Assistive Device (Transfer Goal 1, PT) sit-to-stand/stand-to-sit;bed-to-chair/chair-to-bed;toilet;wheelchair transfer;car transfer;other (see comments)  -AJ     [place] Level/Cues Needed (Transfer Goal 1, PT) standby assist;verbal cues required  -AJ     Time Frame (Transfer Goal 1, PT) long term goal (LTG);10 days  -AJ     Strategies/Barriers (Transfers Goal 1, PT) maintain sternal precuations  -AJ     Progress/Outcome (Transfer Goal 1, PT) new goal  -AJ       Row Name 06/17/25 0800          Gait Training Goal 1 (PT)    Activity/Assistive Device (Gait Training Goal 1, PT) gait (walking locomotion);decrease asymmetrical patterns;decrease fall risk;diminish gait deviation;forward stepping;improve balance and speed;increase endurance/gait distance;increase energy conservation;normalize weight shifts  -AJ     [place] Level (Gait Training Goal 1, PT) standby assist;contact guard required  -AJ     Distance (Gait Training Goal 1, PT) 300' with pain under 5/10 and sternal precautions  -AJ     Time Frame (Gait Training Goal 1, PT) long term goal (LTG);10  days  -AJ     Progress/Outcome (Gait Training Goal 1, PT) new goal  -AJ       Row Name 06/17/25 0800          Stairs Goal 1 (PT)    Activity/Assistive Device (Stairs Goal 1, PT) ascending stairs;descending stairs;decrease fall risk;step-to-step;improve balance and speed  -AJ     [place] Level/Cues Needed (Stairs Goal 1, PT) standby assist  -AJ     Number of Stairs (Stairs Goal 1, PT) 3 as needed for home safety  -AJ     Time Frame (Stairs Goal 1, PT) long term goal (LTG);10 days  -AJ     Progress/Outcome (Stairs Goal 1, PT) new goal  -AJ       Row Name 06/17/25 0800          Therapy Assessment/Plan (PT)    Planned Therapy Interventions (PT) balance training;bed mobility training;gait training;home exercise program;patient/family education;postural re-education;stair training;strengthening;stretching;transfer training;ROM (range of motion)  -AJ               User Key  (r) = Recorded By, (t) = Taken By, (c) = Cosigned By      Initials Name Provider Type    Andrew Perez, PT DPT Physical Therapist                   Clinical Impression       Row Name 06/17/25 0800          Pain    Pretreatment Pain Rating 7/10  -AJ     Posttreatment Pain Rating 9/10  -AJ     Pain Location chest  -AJ     Pain Side/Orientation left;generalized  -AJ     Pain Management Interventions nursing notified;exercise or physical activity utilized;premedicated for activity  -AJ     Response to Pain Interventions activity participation with increased pain  -AJ       Row Name 06/17/25 0800          Plan of Care Review    Plan of Care Reviewed With patient  -AJ     Outcome Evaluation PT eval completed. Pt up in recliner, AOx4 with complaints of pain along L sided of chest And rates at 7/10. Pt reports independence at home and assist as needed from significant other. Pt pre vitals; 67 HR, 94 SpO2, 19 RR, 104/62 BP. Pt educated on pursed lip breathing and sternal precuations prior to mobility and verbalized understanding. Pt performed sit<>stand  with Casimiro and tolerated a total of 60' Of ambulation. During gait, pt reported worsening dizziness, SOA and lightheadedness and asked to return to recliner. Once sitting in recliner, BP checked with reading remaining WFL, 120/65. Pt left sitting in recliner and educated on progression of activity due to denying further mobility due to pain. Pt post vitals; 76 HR, 93% SpO2 remaining on 2L, 22 RR, 137/73 BP. Pt will benefit from skilled PT services to improve activity tolerance, decrease fall risk, stair negotiation, gait safety and maintaining sternal precautions. Anticipate d/c home with assist when medically stable and PT will continue to progress as tolerated.  -       Row Name 06/17/25 0800          Therapy Assessment/Plan (PT)    Patient/Family Therapy Goals Statement (PT) get well and go home  -     Criteria for Skilled Interventions Met (PT) yes;meets criteria;skilled treatment is necessary  -     Therapy Frequency (PT) 2 times/day  -     Predicted Duration of Therapy Intervention (PT) until d/c  -       Row Name 06/17/25 0800          Vital Signs    Pre Systolic BP Rehab 104  -AJ     Pre Treatment Diastolic BP 62  -AJ     Intra Systolic BP Rehab 120  -AJ     Intra Treatment Diastolic BP 65  -AJ     Post Systolic BP Rehab 137  -AJ     Post Treatment Diastolic BP 73  -AJ     Pretreatment Heart Rate (beats/min) 67  -AJ     Posttreatment Heart Rate (beats/min) 76  -AJ     Pretreatment Resp Rate (breaths/min) 19  -AJ     Posttreatment Resp Rate (breaths/min) 22  -AJ     Pre SpO2 (%) 94  -AJ     O2 Delivery Pre Treatment supplemental O2  2L  -AJ     Post SpO2 (%) 93  -AJ     O2 Delivery Post Treatment supplemental O2  2L  -AJ     Pre Patient Position Sitting  -AJ     Intra Patient Position Standing  -AJ     Post Patient Position Sitting  -       Row Name 06/17/25 0800          Positioning and Restraints    Pre-Treatment Position sitting in chair/recliner  -AJ     Post Treatment Position chair  -AJ      In Chair notified nsg;reclined;sitting;call light within reach;encouraged to call for assist;waffle cushion;legs elevated;patient within staff view  -AJ               User Key  (r) = Recorded By, (t) = Taken By, (c) = Cosigned By      Initials Name Provider Type    Andrew Perez, PT DPT Physical Therapist                   Outcome Measures       Row Name 06/17/25 0808 06/17/25 0800       How much help from another person do you currently need...    Turning from your back to your side while in flat bed without using bedrails? 3  -MM 3  -AJ    Moving from lying on back to sitting on the side of a flat bed without bedrails? 2  -MM 3  -AJ    Moving to and from a bed to a chair (including a wheelchair)? 2  -MM 3  -AJ    Standing up from a chair using your arms (e.g., wheelchair, bedside chair)? 3  -MM 3  -AJ    Climbing 3-5 steps with a railing? 2  -MM 3  -AJ    To walk in hospital room? 3  -MM 3  -AJ    AM-PAC 6 Clicks Score (PT) 15  -MM 18  -AJ    Highest Level of Mobility Goal Move to Chair/Commode-4  -MM Walk 10 Steps or More-6  -AJ      Row Name 06/17/25 0500          How much help from another person do you currently need...    Turning from your back to your side while in flat bed without using bedrails? 2  -SE     Moving from lying on back to sitting on the side of a flat bed without bedrails? 2  -SE     Moving to and from a bed to a chair (including a wheelchair)? 2  -SE     Standing up from a chair using your arms (e.g., wheelchair, bedside chair)? 3  -SE     Climbing 3-5 steps with a railing? 2  -SE     To walk in hospital room? 2  -SE     AM-PAC 6 Clicks Score (PT) 13  -SE     Highest Level of Mobility Goal Move to Chair/Commode-4  -SE       Row Name 06/17/25 0800          Functional Assessment    Outcome Measure Options AM-PAC 6 Clicks Basic Mobility (PT)  -AJ               User Key  (r) = Recorded By, (t) = Taken By, (c) = Cosigned By      Initials Name Provider Type    David Atkinson, RN Registered Nurse     Henny Wilks, RN Registered Nurse    Andrew Perez, PT DPT Physical Therapist                                 Physical Therapy Education       Title: PT OT SLP Therapies (Done)       Topic: Physical Therapy (Done)       Point: Mobility training (Done)       Learning Progress Summary            Patient Acceptance, E, VU by SHAISTA at 6/17/2025 1011    Comment: role of PT, sternal precvautions, progressive mobility and ambuakltion                      Point: Home exercise program (Done)       Learning Progress Summary            Patient Acceptance, E, VU by SHAISTA at 6/17/2025 1011    Comment: role of PT, sternal precvautions, progressive mobility and ambuakltion                      Point: Body mechanics (Done)       Learning Progress Summary            Patient Acceptance, E, VU by SHAISTA at 6/17/2025 1011    Comment: role of PT, sternal precvautions, progressive mobility and ambuakltion                      Point: Precautions (Done)       Learning Progress Summary            Patient Acceptance, E, VU by SHAISTA at 6/17/2025 1011    Comment: role of PT, sternal precvautions, progressive mobility and ambuakltion                                      User Key       Initials Effective Dates Name Provider Type Discipline    SHAISTA 08/15/24 -  Andrew Palomino, PT DPT Physical Therapist PT                  PT Recommendation and Plan  Planned Therapy Interventions (PT): balance training, bed mobility training, gait training, home exercise program, patient/family education, postural re-education, stair training, strengthening, stretching, transfer training, ROM (range of motion)  Outcome Evaluation: PT eval completed. Pt up in recliner, AOx4 with complaints of pain along L sided of chest And rates at 7/10. Pt reports independence at home and assist as needed from significant other. Pt pre vitals; 67 HR, 94 SpO2, 19 RR, 104/62 BP. Pt educated on pursed lip breathing and sternal precuations prior to mobility and verbalized understanding.  Pt performed sit<>stand with Casimiro and tolerated a total of 60' Of ambulation. During gait, pt reported worsening dizziness, SOA and lightheadedness and asked to return to recliner. Once sitting in recliner, BP checked with reading remaining WFL, 120/65. Pt left sitting in recliner and educated on progression of activity due to denying further mobility due to pain. Pt post vitals; 76 HR, 93% SpO2 remaining on 2L, 22 RR, 137/73 BP. Pt will benefit from skilled PT services to improve activity tolerance, decrease fall risk, stair negotiation, gait safety and maintaining sternal precautions. Anticipate d/c home with assist when medically stable and PT will continue to progress as tolerated.     Time Calculation:         PT Charges       Row Name 06/17/25 0800             Time Calculation    Start Time 0800  -AJ      Stop Time 0839  -AJ      Time Calculation (min) 39 min  -AJ      PT Received On 06/17/25  -AJ      PT Goal Re-Cert Due Date 06/27/25  -AJ         Untimed Charges    PT Eval/Re-eval Minutes 39  -AJ         Total Minutes    Untimed Charges Total Minutes 39  -AJ       Total Minutes 39  -AJ                User Key  (r) = Recorded By, (t) = Taken By, (c) = Cosigned By      Initials Name Provider Type    AJ Andrew Palomino PT DPT Physical Therapist                  Therapy Charges for Today       Code Description Service Date Service Provider Modifiers Qty    82496185896 HC PT EVAL MOD COMPLEXITY 3 6/17/2025 Andrew Palomino PT DPT GP 1            PT G-Codes  Outcome Measure Options: AM-PAC 6 Clicks Basic Mobility (PT)  AM-PAC 6 Clicks Score (PT): 15  PT Discharge Summary  Anticipated Discharge Disposition (PT): home with assist    Andrew Palomino PT [id number]  6/17/2025

## 2025-06-18 ENCOUNTER — APPOINTMENT (OUTPATIENT)
Dept: GENERAL RADIOLOGY | Facility: HOSPITAL | Age: 80
End: 2025-06-18
Payer: MEDICARE

## 2025-06-18 LAB
ALBUMIN SERPL-MCNC: 3.5 G/DL (ref 3.5–5.2)
ANION GAP SERPL CALCULATED.3IONS-SCNC: 8 MMOL/L (ref 5–15)
BASOPHILS # BLD AUTO: 0.02 10*3/MM3 (ref 0–0.2)
BASOPHILS NFR BLD AUTO: 0.2 % (ref 0–1.5)
BUN SERPL-MCNC: 11.8 MG/DL (ref 8–23)
BUN/CREAT SERPL: 15.9 (ref 7–25)
CALCIUM SPEC-SCNC: 8.7 MG/DL (ref 8.6–10.5)
CHLORIDE SERPL-SCNC: 105 MMOL/L (ref 98–107)
CO2 SERPL-SCNC: 22 MMOL/L (ref 22–29)
CREAT SERPL-MCNC: 0.74 MG/DL (ref 0.76–1.27)
CYTO UR: NORMAL
DEPRECATED RDW RBC AUTO: 55.3 FL (ref 37–54)
EGFRCR SERPLBLD CKD-EPI 2021: 91.6 ML/MIN/1.73
EOSINOPHIL # BLD AUTO: 0.04 10*3/MM3 (ref 0–0.4)
EOSINOPHIL NFR BLD AUTO: 0.4 % (ref 0.3–6.2)
ERYTHROCYTE [DISTWIDTH] IN BLOOD BY AUTOMATED COUNT: 16.3 % (ref 12.3–15.4)
GLUCOSE BLDC GLUCOMTR-MCNC: 119 MG/DL (ref 70–130)
GLUCOSE BLDC GLUCOMTR-MCNC: 125 MG/DL (ref 70–130)
GLUCOSE BLDC GLUCOMTR-MCNC: 131 MG/DL (ref 70–130)
GLUCOSE BLDC GLUCOMTR-MCNC: 137 MG/DL (ref 70–130)
GLUCOSE SERPL-MCNC: 133 MG/DL (ref 65–99)
HCT VFR BLD AUTO: 29.4 % (ref 37.5–51)
HGB BLD-MCNC: 9.5 G/DL (ref 13–17.7)
IMM GRANULOCYTES # BLD AUTO: 0.05 10*3/MM3 (ref 0–0.05)
IMM GRANULOCYTES NFR BLD AUTO: 0.4 % (ref 0–0.5)
INR PPP: 1.12 (ref 0.91–1.09)
LAB AP CASE REPORT: NORMAL
LYMPHOCYTES # BLD AUTO: 0.7 10*3/MM3 (ref 0.7–3.1)
LYMPHOCYTES NFR BLD AUTO: 6.2 % (ref 19.6–45.3)
Lab: NORMAL
MAGNESIUM SERPL-MCNC: 2 MG/DL (ref 1.6–2.4)
MCH RBC QN AUTO: 29.9 PG (ref 26.6–33)
MCHC RBC AUTO-ENTMCNC: 32.3 G/DL (ref 31.5–35.7)
MCV RBC AUTO: 92.5 FL (ref 79–97)
MONOCYTES # BLD AUTO: 0.97 10*3/MM3 (ref 0.1–0.9)
MONOCYTES NFR BLD AUTO: 8.5 % (ref 5–12)
NEUTROPHILS NFR BLD AUTO: 84.3 % (ref 42.7–76)
NEUTROPHILS NFR BLD AUTO: 9.57 10*3/MM3 (ref 1.7–7)
NRBC BLD AUTO-RTO: 0 /100 WBC (ref 0–0.2)
PATH REPORT.FINAL DX SPEC: NORMAL
PATH REPORT.GROSS SPEC: NORMAL
PHOSPHATE SERPL-MCNC: 3.1 MG/DL (ref 2.5–4.5)
PLATELET # BLD AUTO: 110 10*3/MM3 (ref 140–450)
PMV BLD AUTO: 10.8 FL (ref 6–12)
POTASSIUM SERPL-SCNC: 4.6 MMOL/L (ref 3.5–5.2)
PROTHROMBIN TIME: 15 SECONDS (ref 11.8–14.8)
QT INTERVAL: 414 MS
QT INTERVAL: 446 MS
QT INTERVAL: 448 MS
QTC INTERVAL: 449 MS
QTC INTERVAL: 471 MS
QTC INTERVAL: 473 MS
RBC # BLD AUTO: 3.18 10*6/MM3 (ref 4.14–5.8)
SODIUM SERPL-SCNC: 135 MMOL/L (ref 136–145)
WBC NRBC COR # BLD AUTO: 11.35 10*3/MM3 (ref 3.4–10.8)

## 2025-06-18 PROCEDURE — 71045 X-RAY EXAM CHEST 1 VIEW: CPT

## 2025-06-18 PROCEDURE — 85610 PROTHROMBIN TIME: CPT | Performed by: SURGERY

## 2025-06-18 PROCEDURE — 82948 REAGENT STRIP/BLOOD GLUCOSE: CPT

## 2025-06-18 PROCEDURE — 97116 GAIT TRAINING THERAPY: CPT

## 2025-06-18 PROCEDURE — 93010 ELECTROCARDIOGRAM REPORT: CPT | Performed by: INTERNAL MEDICINE

## 2025-06-18 PROCEDURE — 80069 RENAL FUNCTION PANEL: CPT | Performed by: SURGERY

## 2025-06-18 PROCEDURE — 85025 COMPLETE CBC W/AUTO DIFF WBC: CPT | Performed by: SURGERY

## 2025-06-18 PROCEDURE — 25010000002 FUROSEMIDE PER 20 MG: Performed by: NURSE PRACTITIONER

## 2025-06-18 PROCEDURE — 93005 ELECTROCARDIOGRAM TRACING: CPT | Performed by: SURGERY

## 2025-06-18 PROCEDURE — 25010000002 ENOXAPARIN PER 10 MG: Performed by: SURGERY

## 2025-06-18 PROCEDURE — 97110 THERAPEUTIC EXERCISES: CPT

## 2025-06-18 PROCEDURE — 83735 ASSAY OF MAGNESIUM: CPT | Performed by: SURGERY

## 2025-06-18 RX ORDER — POTASSIUM CHLORIDE 1500 MG/1
20 TABLET, EXTENDED RELEASE ORAL 2 TIMES DAILY WITH MEALS
Status: DISCONTINUED | OUTPATIENT
Start: 2025-06-18 | End: 2025-06-20

## 2025-06-18 RX ORDER — BISACODYL 10 MG
10 SUPPOSITORY, RECTAL RECTAL ONCE
Status: COMPLETED | OUTPATIENT
Start: 2025-06-18 | End: 2025-06-18

## 2025-06-18 RX ORDER — FUROSEMIDE 10 MG/ML
20 INJECTION INTRAMUSCULAR; INTRAVENOUS
Status: DISCONTINUED | OUTPATIENT
Start: 2025-06-18 | End: 2025-06-20

## 2025-06-18 RX ORDER — METOPROLOL TARTRATE 25 MG/1
25 TABLET, FILM COATED ORAL EVERY 12 HOURS SCHEDULED
Status: DISCONTINUED | OUTPATIENT
Start: 2025-06-18 | End: 2025-06-29 | Stop reason: HOSPADM

## 2025-06-18 RX ORDER — ASPIRIN 81 MG/1
81 TABLET ORAL DAILY
Qty: 30 TABLET | Refills: 2 | Status: CANCELLED | OUTPATIENT
Start: 2025-06-19

## 2025-06-18 RX ORDER — TAMSULOSIN HYDROCHLORIDE 0.4 MG/1
0.4 CAPSULE ORAL NIGHTLY
Status: DISCONTINUED | OUTPATIENT
Start: 2025-06-18 | End: 2025-06-20

## 2025-06-18 RX ADMIN — POTASSIUM CHLORIDE 20 MEQ: 1500 TABLET, EXTENDED RELEASE ORAL at 17:19

## 2025-06-18 RX ADMIN — CLOPIDOGREL BISULFATE 75 MG: 75 TABLET ORAL at 17:19

## 2025-06-18 RX ADMIN — METHOCARBAMOL TABLETS 500 MG: 500 TABLET, COATED ORAL at 13:55

## 2025-06-18 RX ADMIN — Medication 1 APPLICATION: at 07:09

## 2025-06-18 RX ADMIN — ACETAMINOPHEN 650 MG: 325 TABLET ORAL at 17:19

## 2025-06-18 RX ADMIN — POTASSIUM CHLORIDE 20 MEQ: 1500 TABLET, EXTENDED RELEASE ORAL at 08:25

## 2025-06-18 RX ADMIN — FUROSEMIDE 20 MG: 10 INJECTION, SOLUTION INTRAMUSCULAR; INTRAVENOUS at 08:25

## 2025-06-18 RX ADMIN — POLYETHYLENE GLYCOL 3350 17 G: 17 POWDER, FOR SOLUTION ORAL at 08:17

## 2025-06-18 RX ADMIN — AMIODARONE HYDROCHLORIDE 200 MG: 200 TABLET ORAL at 08:16

## 2025-06-18 RX ADMIN — TAMSULOSIN HYDROCHLORIDE 0.4 MG: 0.4 CAPSULE ORAL at 20:33

## 2025-06-18 RX ADMIN — FAMOTIDINE 20 MG: 20 TABLET, FILM COATED ORAL at 17:20

## 2025-06-18 RX ADMIN — FUROSEMIDE 20 MG: 10 INJECTION, SOLUTION INTRAMUSCULAR; INTRAVENOUS at 17:19

## 2025-06-18 RX ADMIN — METOPROLOL TARTRATE 25 MG: 25 TABLET, FILM COATED ORAL at 08:25

## 2025-06-18 RX ADMIN — METOPROLOL TARTRATE 25 MG: 25 TABLET, FILM COATED ORAL at 20:33

## 2025-06-18 RX ADMIN — BISACODYL 10 MG: 5 TABLET, COATED ORAL at 08:16

## 2025-06-18 RX ADMIN — FAMOTIDINE 20 MG: 20 TABLET, FILM COATED ORAL at 07:09

## 2025-06-18 RX ADMIN — METHOCARBAMOL TABLETS 500 MG: 500 TABLET, COATED ORAL at 07:09

## 2025-06-18 RX ADMIN — ASPIRIN 81 MG: 81 TABLET, COATED ORAL at 08:16

## 2025-06-18 RX ADMIN — CHLORHEXIDINE GLUCONATE 1 APPLICATION: 500 CLOTH TOPICAL at 05:00

## 2025-06-18 RX ADMIN — ENOXAPARIN SODIUM 40 MG: 100 INJECTION SUBCUTANEOUS at 08:17

## 2025-06-18 RX ADMIN — ATORVASTATIN CALCIUM 20 MG: 10 TABLET, FILM COATED ORAL at 20:32

## 2025-06-18 RX ADMIN — OXYCODONE HYDROCHLORIDE 10 MG: 10 TABLET ORAL at 02:44

## 2025-06-18 RX ADMIN — ACETAMINOPHEN 650 MG: 325 TABLET ORAL at 00:28

## 2025-06-18 RX ADMIN — BISACODYL 10 MG: 5 TABLET, COATED ORAL at 20:32

## 2025-06-18 RX ADMIN — ACETAMINOPHEN 650 MG: 325 TABLET ORAL at 07:09

## 2025-06-18 RX ADMIN — BISACODYL 10 MG: 10 SUPPOSITORY RECTAL at 08:16

## 2025-06-18 RX ADMIN — CITALOPRAM 5 MG: 20 TABLET, FILM COATED ORAL at 08:16

## 2025-06-18 RX ADMIN — ACETAMINOPHEN 650 MG: 325 TABLET ORAL at 13:01

## 2025-06-18 RX ADMIN — OXYCODONE HYDROCHLORIDE 5 MG: 5 TABLET ORAL at 20:33

## 2025-06-18 RX ADMIN — OXYCODONE HYDROCHLORIDE 5 MG: 5 TABLET ORAL at 07:09

## 2025-06-18 NOTE — THERAPY TREATMENT NOTE
Patient Name: Linden Montelongo  : 1945    MRN: [id number]                              Today's Date: 2025       Admit Date: 2025    Visit Dx:     ICD-10-CM ICD-9-CM   1. Impaired mobility [Z74.09]  Z74.09 799.89   2. Coronary artery disease involving native coronary artery of native heart without angina pectoris  I25.10 414.01   3. Aortic valve stenosis  I35.0 424.1     Patient Active Problem List   Diagnosis    Coronary artery disease involving native coronary artery of native heart without angina pectoris    Aortic valve stenosis    Nonrheumatic aortic (valve) stenosis     Past Medical History:   Diagnosis Date    A-fib     Hyperlipidemia     Hypertension     LUKE on CPAP     Sleep apnea      Past Surgical History:   Procedure Laterality Date    AORTIC VALVE REPAIR/REPLACEMENT N/A 2025    Procedure: AORTIC VALVE REPLACEMENT 23MM, CORONARY ARTERY BYPASS GRAFTING X2, LEFT INTERNAL MAMMARY ARTERY GRAFT, RIGHT LEG ENDOSCOPIC VEIN HARVEST,  MAZE WITH ENCOMPASS CLAMP, CRYOABLATION, LEFT ATRIAL APPENDAGE EXCLUSION 40MM, TRANSESOPHAGEAL ECHOCARDIOGRAM;  Surgeon: Andrew Mcneil MD;  Location:  [facility];  Service: Cardiothoracic;  Laterality: N/A;    APPENDECTOMY      ATRIAL APPENDAGE EXCLUSION LEFT WITH TRANSESOPHAGEAL ECHOCARDIOGRAM N/A 2025    Procedure: ATRIAL APPENDAGE EXCLUSION LEFT WITH TRANSESOPHAGEAL ECHOCARDIOGRAM;  Surgeon: Andrew Mcneil MD;  Location:  [facility];  Service: Cardiothoracic;  Laterality: N/A;    CORONARY ARTERY BYPASS GRAFT N/A 2025    Procedure: CORONARY ARTERY BYPASS GRAFTING;  Surgeon: Andrew Mcneil MD;  Location:  [facility];  Service: Cardiothoracic;  Laterality: N/A;    ESOPHAGUS SURGERY      MAZE PROCEDURE N/A 2025    Procedure: MAZE PROCEDURE WITH ENCOMPASS CLAMP;  Surgeon: Andrew Mcneil MD;  Location:  [facility];  Service: Cardiothoracic;  Laterality: N/A;    REPLACEMENT TOTAL KNEE Right     TOTAL HIP ARTHROPLASTY Right       General Information       Row  Name 06/18/25 0800          Physical Therapy Time and Intention    Document Type therapy note (daily note)  -GABRIELLA     Mode of Treatment physical therapy  -GABRIELLA       Row Name 06/18/25 0800          General Information    Patient Profile Reviewed yes  -GABRIELLA     Existing Precautions/Restrictions fall;oxygen therapy device and L/min;sternal  Chest tube x 2  -GABRIELLA     Barriers to Rehab medically complex  -GABRIELLA       Row Name 06/18/25 0800          Safety Issues/Impairments Affecting Functional Mobility    Impairments Affecting Function (Mobility) balance;endurance/activity tolerance;shortness of breath;pain;strength  -GABRIELLA               User Key  (r) = Recorded By, (t) = Taken By, (c) = Cosigned By      Initials Name Provider Type    Dereck Reyes, PT DPT Physical Therapist                   Mobility       Row Name 06/18/25 0800          Bed Mobility    Comment, (Bed Mobility) in chair  -GABRIELLA       Row Name 06/18/25 0800          Sit-Stand Transfer    Sit-Stand Jones (Transfers) contact guard;minimum assist (75% patient effort)  -GABRIELLA       Row Name 06/18/25 0800          Gait/Stairs (Locomotion)    Jones Level (Gait) contact guard;minimum assist (75% patient effort)  -GABRIELLA     Distance in Feet (Gait) 200  -GABRIELLA     Deviations/Abnormal Patterns (Gait) domitila decreased;gait speed decreased  -GABRIELLA     Bilateral Gait Deviations forward flexed posture  -GABRIELLA     Comment, (Gait/Stairs) 2 standing rest breaks due to SOB and pain  -GABRIELLA               User Key  (r) = Recorded By, (t) = Taken By, (c) = Cosigned By      Initials Name Provider Type    Dereck Reyes PT DPT Physical Therapist                   Obj/Interventions    No documentation.                  Goals/Plan    No documentation.                  Clinical Impression       Row Name 06/18/25 0800          Pain    Pain Location chest  -GABRIELLA     Pain Management Interventions exercise or physical activity utilized  -GABRIELLA     Additional Documentation Pain Scale: FACES  Pre/Post-Treatment (Group)  -GABRIELLA       Row Name 06/18/25 0800          Pain Scale: FACES Pre/Post-Treatment    Pain: FACES Scale, Pretreatment 4-->hurts little more  -GABRIELLA     Posttreatment Pain Rating 4-->hurts little more  -GABRIELLA       Row Name 06/18/25 0800          Plan of Care Review    Plan of Care Reviewed With patient  -GABRIELLA     Progress improving  -GABRIELLA     Outcome Evaluation PT treatment complete. He was found alert and oriented x 4, sitting in the chair. He was educated on sternal precautions. Was able to stand and ambulate with CGA/min assist. Ambulates 200 ft around the unit. Needs 2 standing rest breaks due to SOB and pain. PT will cont with POC.  -GABRIELLA       Row Name 06/18/25 0800          Vital Signs    Pre Systolic BP Rehab 128  -GABRIELLA     Pre Treatment Diastolic BP 72  -GABRIELLA     Post Systolic BP Rehab 155  -GABRIELLA     Post Treatment Diastolic BP 73  -GABRIELLA     Pretreatment Heart Rate (beats/min) 72  -GABRIELLA     Posttreatment Heart Rate (beats/min) 82  -GABRIELLA     Pre SpO2 (%) 96  -GABRIELLA     O2 Delivery Pre Treatment nasal cannula  -GABRIELLA     O2 Delivery Intra Treatment nasal cannula  -GABRIELLA     Post SpO2 (%) 94  -GABRIELLA     O2 Delivery Post Treatment nasal cannula  -GABRIELLA     Pre Patient Position Sitting  -GABRIELLA     Intra Patient Position Standing  -GABRIELLA     Post Patient Position Sitting  -GABRIELLA       Row Name 06/18/25 0800          Positioning and Restraints    Pre-Treatment Position sitting in chair/recliner  -GABRIELLA     Post Treatment Position chair  -GABRIELLA     In Chair notified nsg;reclined;call light within reach;encouraged to call for assist;RUE elevated;LUE elevated;patient within staff view  -GABRIELLA               User Key  (r) = Recorded By, (t) = Taken By, (c) = Cosigned By      Initials Name Provider Type    Dereck Reyes, PT DPT Physical Therapist                   Outcome Measures       Row Name 06/18/25 0800          How much help from another person do you currently need...    Turning from your back to your side while in flat bed without using  bedrails? 3  -GABRIELLA     Moving from lying on back to sitting on the side of a flat bed without bedrails? 3  -GABRIELLA     Moving to and from a bed to a chair (including a wheelchair)? 3  -GABRIELLA     Standing up from a chair using your arms (e.g., wheelchair, bedside chair)? 3  -GABRIELLA     Climbing 3-5 steps with a railing? 3  -GABRIELLA     To walk in hospital room? 3  -GABRIELLA     AM-PAC 6 Clicks Score (PT) 18  -GABRIELLA     Highest Level of Mobility Goal Walk 10 Steps or More-6  -GABRIELLA       Row Name 06/18/25 0800          Functional Assessment    Outcome Measure Options AM-PAC 6 Clicks Basic Mobility (PT)  -GABRIELLA               User Key  (r) = Recorded By, (t) = Taken By, (c) = Cosigned By      Initials Name Provider Type    Dereck Reyes, PT DPT Physical Therapist                                 Physical Therapy Education       Title: PT OT SLP Therapies (In Progress)       Topic: Physical Therapy (In Progress)       Point: Mobility training (Done)       Learning Progress Summary            Patient Acceptance, E, VU,DU,NR by GABRIELLA at 6/18/2025 0800    Comment: benefits of activity, progression of PT, sternal prec    Acceptance, E, NR by SHAISTA at 6/17/2025 1438    Comment: continued sternal precautions, goal progression    Acceptance, E, VU by SHAISTA at 6/17/2025 1011    Comment: role of PT, sternal precvautions, progressive mobility and ambuakltion                      Point: Home exercise program (In Progress)       Learning Progress Summary            Patient Acceptance, E, NR by SHAISTA at 6/17/2025 1438    Comment: continued sternal precautions, goal progression    Acceptance, E, VU by SHAISTA at 6/17/2025 1011    Comment: role of PT, sternal precvautions, progressive mobility and ambuakltion                      Point: Body mechanics (In Progress)       Learning Progress Summary            Patient Acceptance, E, NR by SHAISTA at 6/17/2025 1438    Comment: continued sternal precautions, goal progression    Acceptance, E, VU by SHAISTA at 6/17/2025 1011    Comment: role of  PT, sternal precvautions, progressive mobility and ambuakltion                      Point: Precautions (Done)       Learning Progress Summary            Patient Acceptance, E, VU,DU,NR by GABRIELLA at 6/18/2025 0800    Comment: benefits of activity, progression of PT, sternal prec    Acceptance, E, NR by SHAISTA at 6/17/2025 1438    Comment: continued sternal precautions, goal progression    Acceptance, E, VU by SHAISTA at 6/17/2025 1011    Comment: role of PT, sternal precvautions, progressive mobility and ambuakltion                                      User Key       Initials Effective Dates Name Provider Type Discipline    GABRIELLA 02/03/23 -  Dereck Farias, PT DPT Physical Therapist PT    SHAISTA 08/15/24 -  Andrew Palomino, TANGELA DPT Physical Therapist PT                  PT Recommendation and Plan     Progress: improving  Outcome Evaluation: PT treatment complete. He was found alert and oriented x 4, sitting in the chair. He was educated on sternal precautions. Was able to stand and ambulate with CGA/min assist. Ambulates 200 ft around the unit. Needs 2 standing rest breaks due to SOB and pain. PT will cont with POC.     Time Calculation:         PT Charges       Row Name 06/18/25 1238             Time Calculation    Start Time 0800  -GABRIELLA      Stop Time 0830  -GABRIELLA      Time Calculation (min) 30 min  -GABRIELLA      PT Received On 06/18/25  -GABRIELLA         Time Calculation- PT    Total Timed Code Minutes- PT 30 minute(s)  -GABRIELLA         Timed Charges    68759 - Gait Training Minutes  30  -GABRIELLA         Total Minutes    Timed Charges Total Minutes 30  -GABRIELLA       Total Minutes 30  -GABRIELLA                User Key  (r) = Recorded By, (t) = Taken By, (c) = Cosigned By      Initials Name Provider Type    Dereck Reyes, PT DPT Physical Therapist                  Therapy Charges for Today       Code Description Service Date Service Provider Modifiers Qty    [id number] HC GAIT TRAINING EA 15 MIN 6/18/2025 Dereck Farias, PT DPT GP 2            PT  G-Codes  Outcome Measure Options: AM-PAC 6 Clicks Basic Mobility (PT)  AM-PAC 6 Clicks Score (PT): 18       Dereck Farias, PT DPT  6/18/2025

## 2025-06-18 NOTE — DISCHARGE SUMMARY
[facility] Cardiothoracic Surgery  DISCHARGE SUMMARY        Date of Admission: 6/16/2025  Date of Discharge:  6/29/2025  Primary Care Physician: Daisy Groves MD    Discharge Diagnoses:  Active Hospital Problems    Diagnosis     **Nonrheumatic aortic (valve) stenosis     Coronary artery disease involving native coronary artery of native heart without angina pectoris     Aortic valve stenosis        Procedures Performed:   AVR (23mm Inspiris), CABG x3 (LIMA to LAD, SVG to Diagonal Artery), Lifted Left Atrial MAZE with Encompss, LAAE with 40mm Atriclip by Dr. Mcneil on 6/16/2025    HPI:  Mr. Linden Montelongo is a 79 y.o. male who presented to Dr. Mcneil with severe symptomatic aortic valve stenosis and severe LAD stenosis.  He had class III NYHA heart failure symptoms.  He also had a history of paroxysmal atrial fibrillation.  Given this, Dr. Mcneil discussed with him the risk and benefits of proceeding with SAVR and CABG.  LV function was normal.  Patient verbalized understanding and was agreeable to proceed with surgery.    Hospital Course: On 6/16/2025, Mr. Montelongo was taken to the operating room for aortic valve replacement, coronary artery bypass grafting, Maze procedure, and left atrial appendage exclusion.  See op note by Dr. Mcneil detailing the operation.  Following surgery he was transferred to the ICU in stable but guarded condition.  He remained hemodynamically stable and was extubated without remark during the evening hours following surgery.  Neurological exam remained intact.  The mediastinal chest tubes were removed on postop day 2.  He was transferred to  on postop day 2 as well.  The remainder of his hospital stay was significant for encouraging pulmonary toilet and ambulation and chest tube drainage.  He did have issues with dizziness with ambulation and intolerance to diuresis due to this.  Subsequently he had ongoing large volume output from his pleural drain.  Despite diuresis, chest  tube output remained significant and he ultimately underwent talc pleurodesis through his chest tube at bedside on postop day 11 with significant decrease in pleural fluid output.  The left pleural drain was ultimately removed on postop day 13.  He is eating well and is demonstrated adequate bowel function.  He is tolerating room air and has met all physical therapy goals.  Pacing wires were removed and he meets criteria for discharge home on postop day13.  The patient is agreeable to this plan.    Routine follow-up with me in 1 week with chest x-ray.  He did have ongoing intermittent atrial fibrillation postoperatively and will continue Xarelto at discharge.      Condition on Discharge:  Neurologically intact and has good pain control.  He is eating well and has demonstrated good bowel function.  The sternum is stable without clicks and the saphenectomy incisions are healing nicely.  The heart is in normal sinus rhythm.  He has met all physical therapy criteria and verbalizes understanding of sternotomy precautions.   He verbalizes understanding of a separately handed out cardiac surgery handout.       Discharge Disposition:      Discharge Medications:     Discharge Medications        ASK your doctor about these medications        Instructions Start Date   amiodarone 200 MG tablet  Commonly known as: PACERONE   200 mg, Oral, Daily      benazepril 5 MG tablet  Commonly known as: LOTENSIN   5 mg, Oral, Daily      citalopram 10 MG tablet  Commonly known as: CeleXA   5 mg, Oral, Daily      ezetimibe 10 MG tablet  Commonly known as: ZETIA   10 mg, Oral, Daily      furosemide 40 MG tablet  Commonly known as: LASIX   40 mg, Oral, Daily      Metoprolol Succinate 25 MG capsule extended-release 24 hour sprinkle   25 mg, Oral, Daily      rosuvastatin 10 MG tablet  Commonly known as: CRESTOR   10 mg, Oral, Daily      traZODone 50 MG tablet  Commonly known as: DESYREL   50 mg, Oral, Nightly      Xarelto 20 MG tablet  Generic  drug: rivaroxaban   20 mg, Oral, Daily               Discharge Diet: Regular diet     Discharge Care Plan / Instructions: Please see the separately handed out cardiac surgery handout.  He will not be referred to cardiac rehab at this time due to recent sternotomy.  Will reassess at his postop follow-up visit    Activity at Discharge:   No heavy lifting greater than 5 pounds or a gallon of milk while maintaining sternal precautions.  Linden Montelongo has been instructed on an exercise  regiment as detailed in a handed out cardiac surgery handout.    Tobacco:  The patient does not use tobacco products and therefore does not need tobacco cessation education.    BMI: Class 2 Severe Obesity (BMI >=35 and <=39.9). Obesity-related health conditions include the following: hypertension, coronary heart disease, and dyslipidemias. Obesity is unchanged. BMI is is above average; BMI management plan is completed. We discussed portion control and increasing exercise.    Follow-up Appointments: Linden Montelongo  is requested to see Daisy Groves MD within 1-2 weeks from time of discharge, to see Maryann ROQUE in 1 week with chest x-ray, to follow-up with Dr. Mcneil in 6 weeks,  and to follow-up with Dr. Land of the cardiology service in 2 weeks.

## 2025-06-18 NOTE — PLAN OF CARE
Goal Outcome Evaluation:  Plan of Care Reviewed With: patient        Progress: improving  Outcome Evaluation: PT treatment complete. He was found alert and oriented x 4, sitting in the chair. He was educated on sternal precautions. Was able to stand and ambulate with CGA/min assist. Ambulates 200 ft around the unit. Needs 2 standing rest breaks due to SOB and pain. PT will cont with POC.

## 2025-06-18 NOTE — PLAN OF CARE
Problem: Adult Inpatient Plan of Care  Goal: Plan of Care Review  Outcome: Progressing  Goal: Patient-Specific Goal (Individualized)  Outcome: Progressing  Goal: Absence of Hospital-Acquired Illness or Injury  Outcome: Progressing  Intervention: Identify and Manage Fall Risk  Recent Flowsheet Documentation  Taken 6/18/2025 1620 by Josue Taylor RN  Safety Promotion/Fall Prevention: safety round/check completed  Taken 6/18/2025 1506 by Josue Taylor RN  Safety Promotion/Fall Prevention: safety round/check completed  Taken 6/18/2025 1417 by Josue Taylor RN  Safety Promotion/Fall Prevention: safety round/check completed  Taken 6/18/2025 1223 by Josue Taylor RN  Safety Promotion/Fall Prevention: safety round/check completed  Taken 6/18/2025 1142 by Josue Taylor RN  Safety Promotion/Fall Prevention: safety round/check completed  Taken 6/18/2025 1048 by Josue Taylor RN  Safety Promotion/Fall Prevention: safety round/check completed  Intervention: Prevent Skin Injury  Recent Flowsheet Documentation  Taken 6/18/2025 1620 by Josue Taylor RN  Body Position: position changed independently  Taken 6/18/2025 1506 by Josue Taylor RN  Body Position: position changed independently  Taken 6/18/2025 1417 by Josue Taylor RN  Body Position: position changed independently  Taken 6/18/2025 1223 by Josue Taylor RN  Body Position: position changed independently  Taken 6/18/2025 1142 by Josue Taylor RN  Body Position: position changed independently  Taken 6/18/2025 1048 by Josue Taylor RN  Body Position: position changed independently  Goal: Optimal Comfort and Wellbeing  Outcome: Progressing  Goal: Readiness for Transition of Care  Outcome: Progressing     Problem: Mechanical Ventilation Invasive  Goal: Effective Communication  Outcome: Progressing  Goal: Optimal Device Function  Outcome: Progressing  Goal: Mechanical Ventilation Liberation  Outcome: Progressing  Goal: Absence of Device-Related Skin and Tissue Injury  Outcome:  Progressing  Goal: Absence of Ventilator-Induced Lung Injury  Outcome: Progressing     Problem: Skin Injury Risk Increased  Goal: Skin Health and Integrity  Outcome: Progressing  Intervention: Optimize Skin Protection  Recent Flowsheet Documentation  Taken 6/18/2025 1620 by Josue Taylor RN  Activity Management: up in chair  Taken 6/18/2025 1506 by Josue Taylor RN  Activity Management: up in chair  Taken 6/18/2025 1223 by Josue aTylor RN  Activity Management: up in chair  Taken 6/18/2025 1142 by Josue Taylor RN  Activity Management: up in chair  Taken 6/18/2025 1048 by Josue Taylor RN  Activity Management: up in chair     Problem: Fall Injury Risk  Goal: Absence of Fall and Fall-Related Injury  Outcome: Progressing  Intervention: Promote Injury-Free Environment  Recent Flowsheet Documentation  Taken 6/18/2025 1620 by Josue Taylor RN  Safety Promotion/Fall Prevention: safety round/check completed  Taken 6/18/2025 1506 by Josue Taylor RN  Safety Promotion/Fall Prevention: safety round/check completed  Taken 6/18/2025 1417 by Josue Taylor RN  Safety Promotion/Fall Prevention: safety round/check completed  Taken 6/18/2025 1223 by Josue Taylor RN  Safety Promotion/Fall Prevention: safety round/check completed  Taken 6/18/2025 1142 by Josue Taylor RN  Safety Promotion/Fall Prevention: safety round/check completed  Taken 6/18/2025 1048 by Josue Taylor RN  Safety Promotion/Fall Prevention: safety round/check completed   Goal Outcome Evaluation:

## 2025-06-18 NOTE — PLAN OF CARE
Goal Outcome Evaluation:  Plan of Care Reviewed With: patient        Progress: improving  Outcome Evaluation: AOx4, walk 200ft before bed. C/o pain 4-7/10 treated with prns with good effect per patient. 2L O2 per NC. Adequate urine output. , Pl 150. NSR 60-70s.

## 2025-06-18 NOTE — PROGRESS NOTES
"Patient name: Linden Montelongo  Patient : 1945  VISIT # [id number]  MR #[id number]    Procedure:Procedure(s):  AORTIC VALVE REPLACEMENT 23MM, CORONARY ARTERY BYPASS GRAFTING X2, LEFT INTERNAL MAMMARY ARTERY GRAFT, RIGHT LEG ENDOSCOPIC VEIN HARVEST,  MAZE WITH ENCOMPASS CLAMP, CRYOABLATION, LEFT ATRIAL APPENDAGE EXCLUSION 40MM, TRANSESOPHAGEAL ECHOCARDIOGRAM  CORONARY ARTERY BYPASS GRAFTING  MAZE PROCEDURE WITH ENCOMPASS CLAMP  ATRIAL APPENDAGE EXCLUSION LEFT WITH TRANSESOPHAGEAL ECHOCARDIOGRAM  Procedure Date:2025  POD:2 Days Post-Op    Subjective     On .  Weight is up 3 pounds and he is 12 pounds above his baseline.  No bowel movement.  Walking 220 feet with PT.   slightly drowsy this morning.  2 L nasal cannula    Telemetry: Sinus rhythm 70s  IV drips: None         Objective     Visit Vitals  /57   Pulse 70   Temp 98.7 °F (37.1 °C) (Oral)   Resp 21   Ht 177 cm (69.69\")   Wt 113 kg (250 lb 3.2 oz)   SpO2 92%   BMI 36.23 kg/m²   Baseline weight 238 pounds    Intake/Output Summary (Last 24 hours) at 2025 0819  Last data filed at 2025 0400  Gross per 24 hour   Intake 350 ml   Output 1205 ml   Net -855 ml     MCT: 210 mL in 24 hours, serosanguineous, no airleak  Left pleural drain: 310 mL in 24 hours, serosanguineous, no airleak    Lab:     CBC:  Results from last 7 days   Lab Units 25  0507 25  0314 25  1709   WBC 10*3/mm3 11.35* 9.53 11.59*   HEMATOCRIT % 29.4* 28.7* 30.3*   PLATELETS 10*3/mm3 110* 126* 123*          BMP:  Results from last 7 days   Lab Units 25  0507 25  0314 25  1709   SODIUM mmol/L 135* 140 140   POTASSIUM mmol/L 4.6 4.3 4.7   CHLORIDE mmol/L 105 109* 109*   CO2 mmol/L 22.0 21.0* 21.0*   GLUCOSE mg/dL 133* 157* 169*   BUN mg/dL 11.8 12.7 14.4   CREATININE mg/dL 0.74* 0.82 0.84          COAG:  Results from last 7 days   Lab Units 25  0507 25  0314 25  1318   INR  1.12*   < > 1.27*   APTT seconds  --   --  44.1*    < > = " values in this interval not displayed.       IMAGES:       Imaging Results (Last 24 Hours)       Procedure Component Value Units Date/Time    XR Chest 1 View [[id number]] Collected: 06/18/25 0708     Updated: 06/18/25 0712    Narrative:      EXAM/TECHNIQUE: XR CHEST 1 VW-     INDICATION: Post-Op Heart Surgery     COMPARISON: Prior day     FINDINGS:     Sternotomy wires are stable in alignment. Prosthetic heart valve and  atrial appendage device are stable in position. Mediastinal drains and  left-sided chest tube are stable.     Cardiac silhouette is enlarged but stable. No change in small bilateral  pleural effusions with overlying atelectasis. Tiny left apical  pneumothorax. No right-sided pneumothorax. No acute osseous finding.       Impression:      1. Support lines/tubes appear stable.  2. No change in small bilateral pleural effusions with overlying  atelectasis.  3. Tiny left apical pneumothorax.     This report was signed and finalized on 6/18/2025 7:09 AM by Dr. Eb Osborn MD.             CXR: Chest tube in stable position with no pneumothorax.  Left basilar atelectasis.    Physical Exam:  General: Alert, oriented. No apparent distress.   Cardiovascular: Regular rate and rhythm without murmur, rubs, or gallops.    Pulmonary: Clear to auscultation bilaterally without wheezing, rubs, or rales.  Chest: Sternotomy incision clean, dry, and intact. Sternum stable. No clicks. Chest tubes to 20 cm suction. No air leak. Fluid is serosanguineous  Abdomen: Soft, nondistended, and nontender.  Extremities: Warm, moves all extremities. No edema. Saphenectomy site clean, dry, and intact  Neurologic:  Grossly intact with no focal deficits.            Impression:  Aortic valve stenosis  Coronary artery disease  Paroxysmal atrial fibrillation  Hyperlipidemia  Hypertension      Plan:  Continue, suppository today  Remove mediastinal chest tubes, keep left pleural drain  Begin twice daily Lasix  Increase  beta-blocker  Decrease narcotic pain medications bowel regimen  Start Flomax tonight, plan to remove Mccarthy catheter tomorrow  Routine postcardiac surgery care  Encourage pulmonary toilet ambulation  Transfer to   Discussed with patient and nursing      JUDE Urena  06/18/25  08:19 CDT

## 2025-06-18 NOTE — THERAPY TREATMENT NOTE
Patient Name: Linden Montelongo  : 1945    MRN: [id number]                              Today's Date: 2025       Admit Date: 2025    Visit Dx:     ICD-10-CM ICD-9-CM   1. Impaired mobility [Z74.09]  Z74.09 799.89   2. Coronary artery disease involving native coronary artery of native heart without angina pectoris  I25.10 414.01   3. Aortic valve stenosis  I35.0 424.1     Patient Active Problem List   Diagnosis    Coronary artery disease involving native coronary artery of native heart without angina pectoris    Aortic valve stenosis    Nonrheumatic aortic (valve) stenosis     Past Medical History:   Diagnosis Date    A-fib     Hyperlipidemia     Hypertension     LUKE on CPAP     Sleep apnea      Past Surgical History:   Procedure Laterality Date    AORTIC VALVE REPAIR/REPLACEMENT N/A 2025    Procedure: AORTIC VALVE REPLACEMENT 23MM, CORONARY ARTERY BYPASS GRAFTING X2, LEFT INTERNAL MAMMARY ARTERY GRAFT, RIGHT LEG ENDOSCOPIC VEIN HARVEST,  MAZE WITH ENCOMPASS CLAMP, CRYOABLATION, LEFT ATRIAL APPENDAGE EXCLUSION 40MM, TRANSESOPHAGEAL ECHOCARDIOGRAM;  Surgeon: Andrew Mcneil MD;  Location:  [facility];  Service: Cardiothoracic;  Laterality: N/A;    APPENDECTOMY      ATRIAL APPENDAGE EXCLUSION LEFT WITH TRANSESOPHAGEAL ECHOCARDIOGRAM N/A 2025    Procedure: ATRIAL APPENDAGE EXCLUSION LEFT WITH TRANSESOPHAGEAL ECHOCARDIOGRAM;  Surgeon: Andrew Mcneil MD;  Location:  [facility];  Service: Cardiothoracic;  Laterality: N/A;    CORONARY ARTERY BYPASS GRAFT N/A 2025    Procedure: CORONARY ARTERY BYPASS GRAFTING;  Surgeon: Andrew Mcneil MD;  Location:  [facility];  Service: Cardiothoracic;  Laterality: N/A;    ESOPHAGUS SURGERY      MAZE PROCEDURE N/A 2025    Procedure: MAZE PROCEDURE WITH ENCOMPASS CLAMP;  Surgeon: Andrew Mcneil MD;  Location:  [facility];  Service: Cardiothoracic;  Laterality: N/A;    REPLACEMENT TOTAL KNEE Right     TOTAL HIP ARTHROPLASTY Right       General Information       Row  Name 06/18/25 1415 06/18/25 0800       Physical Therapy Time and Intention    Document Type therapy note (daily note)  -GABRIELLA therapy note (daily note)  -GABRIELLA    Mode of Treatment physical therapy  -GABRIELLA physical therapy  -GABRIELLA      Row Name 06/18/25 1415 06/18/25 0800       General Information    Patient Profile Reviewed yes  -GABRIELLA yes  -GABRIELLA    Existing Precautions/Restrictions fall;oxygen therapy device and L/min;sternal  Chest tube, prevena w/v  -GABRIELLA fall;oxygen therapy device and L/min;sternal  Chest tube x 2  -GABRIELLA    Barriers to Rehab medically complex  -GABRIELLA medically complex  -GABRIELLA      Row Name 06/18/25 1415 06/18/25 0800       Safety Issues/Impairments Affecting Functional Mobility    Impairments Affecting Function (Mobility) balance;endurance/activity tolerance;shortness of breath;pain;strength  -GABRIELLA balance;endurance/activity tolerance;shortness of breath;pain;strength  -GABRIELLA              User Key  (r) = Recorded By, (t) = Taken By, (c) = Cosigned By      Initials Name Provider Type    Dereck Reyes, PT DPT Physical Therapist                   Mobility       Row Name 06/18/25 1415 06/18/25 0800       Bed Mobility    Comment, (Bed Mobility) in chair  -GABRIELLA in chair  -GABRIELLA      Row Name 06/18/25 1415 06/18/25 0800       Sit-Stand Transfer    Sit-Stand [place] (Transfers) contact guard;minimum assist (75% patient effort)  -GABRIELLA contact guard;minimum assist (75% patient effort)  -GABRIELLA      Row Name 06/18/25 1415 06/18/25 0800       Gait/Stairs (Locomotion)    [place] Level (Gait) minimum assist (75% patient effort)  -GABRIELLA contact guard;minimum assist (75% patient effort)  -GABRIELLA    Distance in Feet (Gait) 200  -GABRIELLA 200  -GABRIELLA    Deviations/Abnormal Patterns (Gait) domitila decreased;gait speed decreased  -GABRIELLA domitila decreased;gait speed decreased  -GABRIELLA    Bilateral Gait Deviations forward flexed posture  -GABRIELLA forward flexed posture  -GABRIELLA    Comment, (Gait/Stairs) More sway in his gait this afternoon, Min assist to maintain dynamic  balance.   -GABRIELLA 2 standing rest breaks due to SOB and pain  -GABRIELLA              User Key  (r) = Recorded By, (t) = Taken By, (c) = Cosigned By      Initials Name Provider Type    Dereck Reyes, TANGELA DPSELMA Physical Therapist                   Obj/Interventions       Row Name 06/18/25 1415          Motor Skills    Therapeutic Exercise --  Cardiac protocol x 10-15 reps  -GABRIELLA               User Key  (r) = Recorded By, (t) = Taken By, (c) = Cosigned By      Initials Name Provider Type    Dereck Reyes, PT DPT Physical Therapist                   Goals/Plan    No documentation.                  Clinical Impression       Row Name 06/18/25 1415 06/18/25 0800       Pain    Pretreatment Pain Rating 0/10 - no pain  -GABRIELLA --    Pain Location -- chest  -GABRIELLA    Pain Management Interventions -- exercise or physical activity utilized  -GABRIELLA    Additional Documentation -- Pain Scale: FACES Pre/Post-Treatment (Group)  -GABRIELLA      Row Name 06/18/25 0800          Pain Scale: FACES Pre/Post-Treatment    Pain: FACES Scale, Pretreatment 4-->hurts little more  -GABRIELLA     Posttreatment Pain Rating 4-->hurts little more  -GABRIELLA       [facility] [place] Name 06/18/25 1415 06/18/25 0800       Plan of Care Review    Plan of Care Reviewed With patient  -GABRIELLA patient  -GABRIELLA    Progress improving  -GABRIELLA improving  -GABRIELLA    Outcome Evaluation PT treatment complete. He was found sitting in the chair. He was fixated on the prevena w/v alarming. However the prevena was not alarming and functioning properly. Needs cues to re direct and seemed confused this PM. Completes cardiac exercises in sitting. Was able to ambulate 200 ft with min assist. More sway in his gait this afternoon, Min assist to maintain dynamic balance. PT will cont with POC./  -GABRIELLA PT treatment complete. He was found alert and oriented x 4, sitting in the chair. He was educated on sternal precautions. Was able to stand and ambulate with CGA/min assist. Ambulates 200 ft around the unit. Needs 2 standing rest breaks  due to SOB and pain. PT will cont with POC.  -GABRIELLA      Row Name 06/18/25 0800          Vital Signs    Pre Systolic BP Rehab 128  -GABRIELLA     Pre Treatment Diastolic BP 72  -GABRIELLA     Post Systolic BP Rehab 155  -GABRIELLA     Post Treatment Diastolic BP 73  -GABRIELLA     Pretreatment Heart Rate (beats/min) 72  -GABRIELLA     Posttreatment Heart Rate (beats/min) 82  -GABRIELLA     Pre SpO2 (%) 96  -GABRIELLA     O2 Delivery Pre Treatment nasal cannula  -GABRIELLA     O2 Delivery Intra Treatment nasal cannula  -GABRIELLA     Post SpO2 (%) 94  -GABRIELLA     O2 Delivery Post Treatment nasal cannula  -GABRIELLA     Pre Patient Position Sitting  -GABRIELLA     Intra Patient Position Standing  -GABRIELLA     Post Patient Position Sitting  -GABRIELLA       Row Name 06/18/25 1415 06/18/25 0800       Positioning and Restraints    Pre-Treatment Position sitting in chair/recliner  -GABRIELLA sitting in chair/recliner  -GABRIELLA    Post Treatment Position chair  -GABRIELLA chair  -GABRIELLA    In Chair notified nsg;reclined;call light within reach;encouraged to call for assist  -GABRIELLA notified nsg;reclined;call light within reach;encouraged to call for assist;RUE elevated;LUE elevated;patient within staff view  -GABRIELLA              User Key  (r) = Recorded By, (t) = Taken By, (c) = Cosigned By      Initials Name Provider Type    Dereck Reyes, PT DPT Physical Therapist                   Outcome Measures       Row Name 06/18/25 1415 06/18/25 0800       How much help from another person do you currently need...    Turning from your back to your side while in flat bed without using bedrails? 3  -GABRIELLA 3  -GABRIELLA    Moving from lying on back to sitting on the side of a flat bed without bedrails? 3  -GABRIELLA 3  -GABRIELLA    Moving to and from a bed to a chair (including a wheelchair)? 3  -GABRIELLA 3  -GABRIELLA    Standing up from a chair using your arms (e.g., wheelchair, bedside chair)? 3  -GABRIELLA 3  -GABRIELLA    Climbing 3-5 steps with a railing? 3  -GABRIELLA 3  -GABRIELLA    To walk in hospital room? 3  -GABRIELLA 3  -GABRIELLA    AM-PAC 6 Clicks Score (PT) 18  -GABRIELLA 18  -GABRIELLA    Highest Level of Mobility Goal Walk  10 Steps or More-6  -GABRIELLA Walk 10 Steps or More-6  -GABRIELLA      Row Name 06/18/25 1415 06/18/25 0800       Functional Assessment    Outcome Measure Options AM-PAC 6 Clicks Basic Mobility (PT)  -GABRIELLA AM-PAC 6 Clicks Basic Mobility (PT)  -GABRIELLA              User Key  (r) = Recorded By, (t) = Taken By, (c) = Cosigned By      Initials Name Provider Type    Dereck Reyes, PT DPT Physical Therapist                                 Physical Therapy Education       Title: PT OT SLP Therapies (In Progress)       Topic: Physical Therapy (In Progress)       Point: Mobility training (In Progress)       Learning Progress Summary            Patient Acceptance, E, NR by GABRIELLA at 6/18/2025 1415    Comment: sternal prec, gait safety    Acceptance, E, VU,DU,NR by GABRIELLA at 6/18/2025 0800    Comment: benefits of activity, progression of PT, sternal prec    Acceptance, E, NR by SHAISTA at 6/17/2025 1438    Comment: continued sternal precautions, goal progression    Acceptance, E, VU by SHAISTA at 6/17/2025 1011    Comment: role of PT, sternal precvautions, progressive mobility and ambuakltion                      Point: Home exercise program (In Progress)       Learning Progress Summary            Patient Acceptance, E, NR by SHAISTA at 6/17/2025 1438    Comment: continued sternal precautions, goal progression    Acceptance, E, VU by SHAISTA at 6/17/2025 1011    Comment: role of PT, sternal precvautions, progressive mobility and ambuakltion                      Point: Body mechanics (In Progress)       Learning Progress Summary            Patient Acceptance, E, NR by SHAISTA at 6/17/2025 1438    Comment: continued sternal precautions, goal progression    Acceptance, E, VU by SHAISTA at 6/17/2025 1011    Comment: role of PT, sternal precvautions, progressive mobility and ambuakltion                      Point: Precautions (Done)       Learning Progress Summary            Patient Acceptance, E, VU,DU,NR by GABRIELLA at 6/18/2025 0800    Comment: benefits of activity, progression of PT,  sternal prec    Acceptance, E, NR by SHAISTA at 6/17/2025 1438    Comment: continued sternal precautions, goal progression    Acceptance, E, VU by SHAISTA at 6/17/2025 1011    Comment: role of PT, sternal precvautions, progressive mobility and ambuakltion                                      User Key       Initials Effective Dates Name Provider Type Discipline    GABRIELLA 02/03/23 -  Dereck Farias, PT DPT Physical Therapist PT    SHAISTA 08/15/24 -  Andrew Palomino, PT DPT Physical Therapist PT                  PT Recommendation and Plan     Progress: improving  Outcome Evaluation: PT treatment complete. He was found sitting in the chair. He was fixated on the prevena w/v alarming. However the prevena was not alarming and functioning properly. Needs cues to re direct and seemed confused this PM. Completes cardiac exercises in sitting. Was able to ambulate 200 ft with min assist. More sway in his gait this afternoon, Min assist to maintain dynamic balance. PT will cont with POC./     Time Calculation:         PT Charges       Row Name 06/18/25 1508 06/18/25 1238          Time Calculation    Start Time 1415  -GABRIELLA 0800  -GABRIELLA     Stop Time 1445  -GABRIELLA 0830  -GABRIELLA     Time Calculation (min) 30 min  -GABRIELLA 30 min  -GABRIELLA     PT Received On 06/18/25  -GABRIELLA 06/18/25  -GABRIELLA        Time Calculation- PT    Total Timed Code Minutes- PT 30 minute(s)  -GABRIELLA 30 minute(s)  -GABRIELLA        Timed Charges    40202 - PT Therapeutic Exercise Minutes 15  -GABRIELLA --     51679 - Gait Training Minutes  15  -GABRIELLA 30  -AGBRIELLA        Total Minutes    Timed Charges Total Minutes 30  -GABRIELLA 30  -GABRIELLA      Total Minutes 30  -GABRIELLA 30  -GABRIELLA               User Key  (r) = Recorded By, (t) = Taken By, (c) = Cosigned By      Initials Name Provider Type    Dereck Reyes, PT DPT Physical Therapist                  Therapy Charges for Today       Code Description Service Date Service Provider Modifiers Qty    87566945940 HC GAIT TRAINING EA 15 MIN 6/18/2025 Dereck Farias, PT DPT GP 2    43914610430 HC  GAIT TRAINING EA 15 MIN 6/18/2025 Dereck Farias, PT DPT GP 1    65963135638 HC PT THER PROC EA 15 MIN 6/18/2025 Dereck Farias, PT DPT GP 1            PT G-Codes  Outcome Measure Options: AM-PAC 6 Clicks Basic Mobility (PT)  AM-PAC 6 Clicks Score (PT): 18       Dereck Farias, PT DPT  6/18/2025

## 2025-06-19 ENCOUNTER — APPOINTMENT (OUTPATIENT)
Dept: GENERAL RADIOLOGY | Facility: HOSPITAL | Age: 80
End: 2025-06-19
Payer: MEDICARE

## 2025-06-19 LAB
ANION GAP SERPL CALCULATED.3IONS-SCNC: 12 MMOL/L (ref 5–15)
BUN SERPL-MCNC: 12.5 MG/DL (ref 8–23)
BUN/CREAT SERPL: 18.7 (ref 7–25)
CALCIUM SPEC-SCNC: 8.6 MG/DL (ref 8.6–10.5)
CHLORIDE SERPL-SCNC: 101 MMOL/L (ref 98–107)
CO2 SERPL-SCNC: 21 MMOL/L (ref 22–29)
CREAT SERPL-MCNC: 0.67 MG/DL (ref 0.76–1.27)
DEPRECATED RDW RBC AUTO: 53.2 FL (ref 37–54)
EGFRCR SERPLBLD CKD-EPI 2021: 94.4 ML/MIN/1.73
ERYTHROCYTE [DISTWIDTH] IN BLOOD BY AUTOMATED COUNT: 16 % (ref 12.3–15.4)
GLUCOSE BLDC GLUCOMTR-MCNC: 105 MG/DL (ref 70–130)
GLUCOSE BLDC GLUCOMTR-MCNC: 112 MG/DL (ref 70–130)
GLUCOSE BLDC GLUCOMTR-MCNC: 115 MG/DL (ref 70–130)
GLUCOSE BLDC GLUCOMTR-MCNC: 120 MG/DL (ref 70–130)
GLUCOSE BLDC GLUCOMTR-MCNC: 122 MG/DL (ref 70–130)
GLUCOSE BLDC GLUCOMTR-MCNC: 140 MG/DL (ref 70–130)
GLUCOSE SERPL-MCNC: 122 MG/DL (ref 65–99)
HCT VFR BLD AUTO: 29.8 % (ref 37.5–51)
HGB BLD-MCNC: 9.7 G/DL (ref 13–17.7)
MAGNESIUM SERPL-MCNC: 1.9 MG/DL (ref 1.6–2.4)
MCH RBC QN AUTO: 29.6 PG (ref 26.6–33)
MCHC RBC AUTO-ENTMCNC: 32.6 G/DL (ref 31.5–35.7)
MCV RBC AUTO: 90.9 FL (ref 79–97)
PLATELET # BLD AUTO: 129 10*3/MM3 (ref 140–450)
PMV BLD AUTO: 11.2 FL (ref 6–12)
POTASSIUM SERPL-SCNC: 3.7 MMOL/L (ref 3.5–5.2)
RBC # BLD AUTO: 3.28 10*6/MM3 (ref 4.14–5.8)
SODIUM SERPL-SCNC: 134 MMOL/L (ref 136–145)
WBC NRBC COR # BLD AUTO: 9.06 10*3/MM3 (ref 3.4–10.8)

## 2025-06-19 PROCEDURE — 94761 N-INVAS EAR/PLS OXIMETRY MLT: CPT

## 2025-06-19 PROCEDURE — 63710000001 INSULIN LISPRO (HUMAN) PER 5 UNITS: Performed by: NURSE PRACTITIONER

## 2025-06-19 PROCEDURE — 93010 ELECTROCARDIOGRAM REPORT: CPT | Performed by: INTERNAL MEDICINE

## 2025-06-19 PROCEDURE — 83735 ASSAY OF MAGNESIUM: CPT | Performed by: NURSE PRACTITIONER

## 2025-06-19 PROCEDURE — 71046 X-RAY EXAM CHEST 2 VIEWS: CPT

## 2025-06-19 PROCEDURE — 25010000002 FUROSEMIDE PER 20 MG: Performed by: NURSE PRACTITIONER

## 2025-06-19 PROCEDURE — 94799 UNLISTED PULMONARY SVC/PX: CPT

## 2025-06-19 PROCEDURE — 80048 BASIC METABOLIC PNL TOTAL CA: CPT | Performed by: FAMILY MEDICINE

## 2025-06-19 PROCEDURE — 25010000002 ENOXAPARIN PER 10 MG: Performed by: NURSE PRACTITIONER

## 2025-06-19 PROCEDURE — 93005 ELECTROCARDIOGRAM TRACING: CPT | Performed by: SURGERY

## 2025-06-19 PROCEDURE — 85025 COMPLETE CBC W/AUTO DIFF WBC: CPT | Performed by: FAMILY MEDICINE

## 2025-06-19 PROCEDURE — 97116 GAIT TRAINING THERAPY: CPT

## 2025-06-19 PROCEDURE — 85027 COMPLETE CBC AUTOMATED: CPT | Performed by: NURSE PRACTITIONER

## 2025-06-19 PROCEDURE — 84100 ASSAY OF PHOSPHORUS: CPT | Performed by: FAMILY MEDICINE

## 2025-06-19 PROCEDURE — 82948 REAGENT STRIP/BLOOD GLUCOSE: CPT

## 2025-06-19 PROCEDURE — 83735 ASSAY OF MAGNESIUM: CPT | Performed by: FAMILY MEDICINE

## 2025-06-19 PROCEDURE — 80048 BASIC METABOLIC PNL TOTAL CA: CPT | Performed by: NURSE PRACTITIONER

## 2025-06-19 RX ORDER — BISACODYL 10 MG
10 SUPPOSITORY, RECTAL RECTAL ONCE
Status: COMPLETED | OUTPATIENT
Start: 2025-06-19 | End: 2025-06-19

## 2025-06-19 RX ADMIN — Medication 1 APPLICATION: at 05:49

## 2025-06-19 RX ADMIN — BISACODYL 10 MG: 5 TABLET, COATED ORAL at 08:48

## 2025-06-19 RX ADMIN — INSULIN LISPRO 2 UNITS: 100 INJECTION, SOLUTION INTRAVENOUS; SUBCUTANEOUS at 08:46

## 2025-06-19 RX ADMIN — ASPIRIN 81 MG: 81 TABLET, COATED ORAL at 08:48

## 2025-06-19 RX ADMIN — FUROSEMIDE 20 MG: 10 INJECTION, SOLUTION INTRAMUSCULAR; INTRAVENOUS at 08:47

## 2025-06-19 RX ADMIN — FAMOTIDINE 20 MG: 20 TABLET, FILM COATED ORAL at 17:25

## 2025-06-19 RX ADMIN — ATORVASTATIN CALCIUM 20 MG: 10 TABLET, FILM COATED ORAL at 21:07

## 2025-06-19 RX ADMIN — BISACODYL 10 MG: 5 TABLET, COATED ORAL at 21:07

## 2025-06-19 RX ADMIN — TAMSULOSIN HYDROCHLORIDE 0.4 MG: 0.4 CAPSULE ORAL at 21:07

## 2025-06-19 RX ADMIN — METOPROLOL TARTRATE 25 MG: 25 TABLET, FILM COATED ORAL at 21:07

## 2025-06-19 RX ADMIN — POTASSIUM CHLORIDE 20 MEQ: 1500 TABLET, EXTENDED RELEASE ORAL at 08:48

## 2025-06-19 RX ADMIN — ACETAMINOPHEN 650 MG: 325 TABLET ORAL at 12:34

## 2025-06-19 RX ADMIN — AMIODARONE HYDROCHLORIDE 200 MG: 200 TABLET ORAL at 08:48

## 2025-06-19 RX ADMIN — ACETAMINOPHEN 650 MG: 325 TABLET ORAL at 05:48

## 2025-06-19 RX ADMIN — POLYETHYLENE GLYCOL 3350 17 G: 17 POWDER, FOR SOLUTION ORAL at 08:47

## 2025-06-19 RX ADMIN — BISACODYL 10 MG: 10 SUPPOSITORY RECTAL at 08:48

## 2025-06-19 RX ADMIN — CLOPIDOGREL BISULFATE 75 MG: 75 TABLET ORAL at 17:25

## 2025-06-19 RX ADMIN — METOPROLOL TARTRATE 25 MG: 25 TABLET, FILM COATED ORAL at 08:48

## 2025-06-19 RX ADMIN — FAMOTIDINE 20 MG: 20 TABLET, FILM COATED ORAL at 08:48

## 2025-06-19 RX ADMIN — CITALOPRAM 5 MG: 20 TABLET, FILM COATED ORAL at 09:24

## 2025-06-19 RX ADMIN — ENOXAPARIN SODIUM 40 MG: 100 INJECTION SUBCUTANEOUS at 08:47

## 2025-06-19 RX ADMIN — ACETAMINOPHEN 650 MG: 325 TABLET ORAL at 17:25

## 2025-06-19 NOTE — PLAN OF CARE
Goal Outcome Evaluation:    Taken today at 0454 by Mari Torres RNA  Outcome Evaluation    Sinus 67-72. A&O x4. Pt slept well through the night in his chair. Pt up and walking well this morning (min assist) before taking him to xray. following sternal precautions well. CHG bath was given this morning along with catheter care. attempted taking 1 L of O2 off after returning from xray this morning but pts O2 sat was falling below 88 on numerous occasions while he was resting. Pt was put back on 1 L O2 NC.

## 2025-06-19 NOTE — CASE MANAGEMENT/SOCIAL WORK
Continued Stay Note   [facility]     Patient Name: Linden Montelongo  MRN: [id number]  Today's Date: 6/19/2025    Admit Date: 6/16/2025    Plan: Home   Discharge Plan       Row Name 06/19/25 1243       Plan    Plan Home    Patient/Family in Agreement with Plan yes    Plan Comments Chart reviewed; events noted.  Pt is ambulating over 200 ft.  Pt plans home; no dc needs identified.    Final Discharge Disposition Code 01 - home or self-care                   Discharge Codes    No documentation.                       CAROL Sanchez

## 2025-06-19 NOTE — PLAN OF CARE
Goal Outcome Evaluation:         Patient stood with CGA. Ambulated 215' with Min assist and 3 standing rests. Worked on breathing. C/O SOA, on RA  and SATs 92 - 94%.

## 2025-06-19 NOTE — THERAPY TREATMENT NOTE
Acute Care - Physical Therapy Treatment Note  [facility]     Patient Name: Linden Montelongo  : 1945  MRN: [id number]  Today's Date: 2025      Visit Dx:     ICD-10-CM ICD-9-CM   1. Impaired mobility [Z74.09]  Z74.09 799.89   2. Coronary artery disease involving native coronary artery of native heart without angina pectoris  I25.10 414.01   3. Aortic valve stenosis  I35.0 424.1     Patient Active Problem List   Diagnosis    Coronary artery disease involving native coronary artery of native heart without angina pectoris    Aortic valve stenosis    Nonrheumatic aortic (valve) stenosis     Past Medical History:   Diagnosis Date    A-fib     Hyperlipidemia     Hypertension     LUKE on CPAP     Sleep apnea      Past Surgical History:   Procedure Laterality Date    AORTIC VALVE REPAIR/REPLACEMENT N/A 2025    Procedure: AORTIC VALVE REPLACEMENT 23MM, CORONARY ARTERY BYPASS GRAFTING X2, LEFT INTERNAL MAMMARY ARTERY GRAFT, RIGHT LEG ENDOSCOPIC VEIN HARVEST,  MAZE WITH ENCOMPASS CLAMP, CRYOABLATION, LEFT ATRIAL APPENDAGE EXCLUSION 40MM, TRANSESOPHAGEAL ECHOCARDIOGRAM;  Surgeon: Andrew Mcneil MD;  Location:  [facility];  Service: Cardiothoracic;  Laterality: N/A;    APPENDECTOMY      ATRIAL APPENDAGE EXCLUSION LEFT WITH TRANSESOPHAGEAL ECHOCARDIOGRAM N/A 2025    Procedure: ATRIAL APPENDAGE EXCLUSION LEFT WITH TRANSESOPHAGEAL ECHOCARDIOGRAM;  Surgeon: Andrew Mcneil MD;  Location:  [facility];  Service: Cardiothoracic;  Laterality: N/A;    CORONARY ARTERY BYPASS GRAFT N/A 2025    Procedure: CORONARY ARTERY BYPASS GRAFTING;  Surgeon: Andrew Mcneil MD;  Location:  [facility];  Service: Cardiothoracic;  Laterality: N/A;    ESOPHAGUS SURGERY      MAZE PROCEDURE N/A 2025    Procedure: MAZE PROCEDURE WITH ENCOMPASS CLAMP;  Surgeon: Andrew Mcneil MD;  Location:  [facility];  Service: Cardiothoracic;  Laterality: N/A;    REPLACEMENT TOTAL KNEE Right     TOTAL HIP ARTHROPLASTY Right      PT Assessment (Last 12  Hours)       PT Evaluation and Treatment       Row Name 06/19/25 1013          Physical Therapy Time and Intention    Subjective Information complains of;weakness;fatigue;pain  -     Document Type therapy note (daily note)  -     Mode of Treatment physical therapy  -       Row Name 06/19/25 1013          General Information    Existing Precautions/Restrictions fall;oxygen therapy device and L/min  chest tube, prevena  -       Row Name 06/19/25 1013          Pain    Posttreatment Pain Rating 5/10  -     Pain Location chest  -     Pain Side/Orientation generalized  -       Row Name 06/19/25 1013          Gait/Stairs (Locomotion)    [place] Level (Gait) minimum assist (75% patient effort)  -     Distance in Feet (Gait) 200  -KULWANT     Deviations/Abnormal Patterns (Gait) gait speed decreased;stride length decreased  3 standing rests  -     Bilateral Gait Deviations forward flexed posture  -       Row Name 06/19/25 1013          Motor Skills    Comments, Therapeutic Exercise warm ups x15  -       Row Name             Wound 06/16/25 0817 sternal Surgical    Wound - Properties Group Placement Date: 06/16/25  - Placement Time: 0817  -LH Present on Original Admission: N  -LH Location: sternal  -LH Primary Wound Type: Surgical  -LH Additional Comments: [facility]  -    Retired Wound - Properties Group Placement Date: 06/16/25  - Placement Time: 0817  -LH Present on Original Admission: N  -LH Location: sternal  [facility]  -    Retired Wound - Properties Group Placement Date: 06/16/25  - Placement Time: 0817  -LH Present on Original Admission: N  -LH Location: sternal  -LH [facility]  -    Retired Wound - Properties Group Date first assessed: 06/16/25  - Time first assessed: 0817  -LH Present on Original Admission: N  -LH Location: sternal  -LH Additional Comments: [facility]      Row Name             Wound 06/16/25 0817 Right anterior knee Surgical     Wound - Properties Group Placement Date: 06/16/25 - Placement Time: 0817  -LH Present on Original Admission: N  -LH Side: Right  -LH Orientation: anterior  -LH Location: knee  -LH Primary Wound Type: Surgical  -LH Additional Comments: mastisol, steri strips, mepilex, ace  -LH    Retired Wound - Properties Group Placement Date: 06/16/25 -LH Placement Time: 0817  -LH Present on Original Admission: N  -LH Side: Right  -LH Orientation: anterior  -LH Location: knee  -LH Additional Comments: mastisol, steri strips, mepilex, ace  -LH    Retired Wound - Properties Group Placement Date: 06/16/25 -LH Placement Time: 0817  -LH Present on Original Admission: N  -LH Side: Right  -LH Orientation: anterior  -LH Location: knee  -LH Additional Comments: mastisol, steri strips, mepilex, ace  -LH    Retired Wound - Properties Group Date first assessed: 06/16/25 -LH Time first assessed: 0817  -LH Present on Original Admission: N  -LH Side: Right  -LH Location: knee  -LH Additional Comments: mastisol, steri strips, mepilex, ace  -LH      Row Name             Wound 06/16/25 0817 Right anterior groin Surgical    Wound - Properties Group Placement Date: 06/16/25 -LH Placement Time: 0817  -LH Side: Right  -LH Orientation: anterior  -LH Location: groin  -LH Primary Wound Type: Surgical  -LH Additional Comments: mastisol, steri strips, mepilex, ace  -LH    Retired Wound - Properties Group Placement Date: 06/16/25 -LH Placement Time: 0817  -LH Side: Right  -LH Orientation: anterior  -LH Location: groin  -LH Additional Comments: mastisol, steri strips, mepilex, ace  -LH    Retired Wound - Properties Group Placement Date: 06/16/25 -LH Placement Time: 0817  -LH Side: Right  -LH Orientation: anterior  -LH Location: groin  -LH Additional Comments: mastisol, steri strips, mepilex, ace  -LH    Retired Wound - Properties Group Date first assessed: 06/16/25  -LH Time first assessed: 0817  -LH Side: Right  -LH Location: groin  -LH Additional  Comments: mastisol, steri strips, mepilex, ace  -LH      Row Name 06/19/25 1013          Positioning and Restraints    Pre-Treatment Position sitting in chair/recliner  -     Post Treatment Position chair  -KULWANT     In Chair reclined;call light within reach;encouraged to call for assist;notified nsg;LUE elevated;RUE elevated  -               User Key  (r) = Recorded By, (t) = Taken By, (c) = Cosigned By      Initials Name Provider Type    Vale Dunne PTA Physical Therapist Assistant     Dania De La Rosa, RN Registered Nurse                    Physical Therapy Education       Title: PT OT SLP Therapies (In Progress)       Topic: Physical Therapy (In Progress)       Point: Mobility training (In Progress)       Learning Progress Summary            Patient Acceptance, E, NR by GABRIELLA at 6/18/2025 1415    Comment: sternal prec, gait safety    Acceptance, E, VU,DU,NR by GABRIELLA at 6/18/2025 0800    Comment: benefits of activity, progression of PT, sternal prec    Acceptance, E, NR by SHAISTA at 6/17/2025 1438    Comment: continued sternal precautions, goal progression    Acceptance, E, VU by SHAISTA at 6/17/2025 1011    Comment: role of PT, sternal precvautions, progressive mobility and ambuakltion                      Point: Home exercise program (In Progress)       Learning Progress Summary            Patient Acceptance, E, NR by SHAISTA at 6/17/2025 1438    Comment: continued sternal precautions, goal progression    Acceptance, E, VU by SHAISTA at 6/17/2025 1011    Comment: role of PT, sternal precvautions, progressive mobility and ambuakltion                      Point: Body mechanics (In Progress)       Learning Progress Summary            Patient Acceptance, E, NR by SHAISTA at 6/17/2025 1438    Comment: continued sternal precautions, goal progression    Acceptance, E, VU by SHAISTA at 6/17/2025 1011    Comment: role of PT, sternal precvautions, progressive mobility and ambuakltion                      Point: Precautions (Done)       Learning  Progress Summary            Patient Acceptance, E, VU,DU,NR by GABRIELLA at 6/18/2025 0800    Comment: benefits of activity, progression of PT, sternal prec    Acceptance, E, NR by SHAISTA at 6/17/2025 1438    Comment: continued sternal precautions, goal progression    Acceptance, E, VU by SHAISTA at 6/17/2025 1011    Comment: role of PT, sternal precvautions, progressive mobility and ambuakltion                                      User Key       Initials Effective Dates Name Provider Type Discipline    GABRIELLA 02/03/23 -  Dereck Farias, PT DPT Physical Therapist PT    SHAISTA 08/15/24 -  Andrew Palomino, PT DPT Physical Therapist PT                  PT Recommendation and Plan         Outcome Measures       Row Name 06/19/25 1100             How much help from another person do you currently need...    Turning from your back to your side while in flat bed without using bedrails? 4  -KULWANT      Moving from lying on back to sitting on the side of a flat bed without bedrails? 3  -KULWANT      Moving to and from a bed to a chair (including a wheelchair)? 3  -KULWANT      Standing up from a chair using your arms (e.g., wheelchair, bedside chair)? 3  -KULWANT      Climbing 3-5 steps with a railing? 3  -KULWANT      To walk in hospital room? 3  -KULWANT      AM-PAC 6 Clicks Score (PT) 19  -KULWANT                User Key  (r) = Recorded By, (t) = Taken By, (c) = Cosigned By      Initials Name Provider Type    Vale Dunne PTA Physical Therapist Assistant                     Time Calculation:    PT Charges       Row Name 06/19/25 1158             Time Calculation    Start Time 1013  -KULWANT      Stop Time 1038  -KULWANT      Time Calculation (min) 25 min  -KULWANT         Timed Charges    17164 - Gait Training Minutes  25  -KULWANT         Total Minutes    Timed Charges Total Minutes 25  -KULWANT       Total Minutes 25  -KULWANT                User Key  (r) = Recorded By, (t) = Taken By, (c) = Cosigned By      Initials Name Provider Type    Vale Dunne PTA Physical Therapist Assistant                   Therapy Charges for Today       Code Description Service Date Service Provider Modifiers Qty    21259633114 HC GAIT TRAINING EA 15 MIN 6/19/2025 Vale Mack, PTA GP 2            PT G-Codes  Outcome Measure Options: AM-PAC 6 Clicks Basic Mobility (PT)  AM-PAC 6 Clicks Score (PT): 19    Vale Mack, GLORIA  6/19/2025

## 2025-06-19 NOTE — PLAN OF CARE
"Goal Outcome Evaluation:      Patient stood with CGA. Ambulated 25' and just walked thru doorway into [place]. Patient became unresponsive with glazed eyes. Breathing became \"snorts\". This PTA was able to hold patient up against the wall until chair was brought. After transferred to bed BP was 120/71 and HR 82. Patient responding properly at this time.                                      "

## 2025-06-19 NOTE — NURSING NOTE
----- Message from Emmett Kerns MD sent at 8/3/2018  9:58 AM EDT -----  Hello    MA Team - can we call [facility] [phone] to make appt with renal transplant Dr Vicenta Seip for f/u of renal art stenosis  Dr Wallace Aguilar is aware  I tried to make appt but no one was answering line  Can we let pt know of the time and also can you let me know    Dr Nicole Rush - pt was upset today because she was sent home from GI office due to shingles and did not complete GI studies  When I saw pt, the lesions were crusted and therefore to me they did not seem infectious   Just wanted to make you aware    Thank you    jessica Pt in [place] ambulating with PT. Pt began to stiffen up and became unresponsive. We were able to get him seated in chair. Checked for pulse and found weak carotid pulse. Rolled pt to bed and picked him up and put into bed. Pt started to become more responsive. VS checked and were WNL. Blood sugar checked and was WNL. Pt back to his normal alert and orientated. Maryann ROQUE notified. No new orders

## 2025-06-19 NOTE — PROGRESS NOTES
"Patient name: Linden Montelongo  Patient : 1945  VISIT # [id number]  MR #[id number]    Procedure:Procedure(s):  AORTIC VALVE REPLACEMENT 23MM, CORONARY ARTERY BYPASS GRAFTING X2, LEFT INTERNAL MAMMARY ARTERY GRAFT, RIGHT LEG ENDOSCOPIC VEIN HARVEST,  MAZE WITH ENCOMPASS CLAMP, CRYOABLATION, LEFT ATRIAL APPENDAGE EXCLUSION 40MM, TRANSESOPHAGEAL ECHOCARDIOGRAM  CORONARY ARTERY BYPASS GRAFTING  MAZE PROCEDURE WITH ENCOMPASS CLAMP  ATRIAL APPENDAGE EXCLUSION LEFT WITH TRANSESOPHAGEAL ECHOCARDIOGRAM  Procedure Date:2025  POD:3 Days Post-Op    Subjective     On room air.  Weight is down 6 pounds and he is 6 pounds above his baseline.  No bowel movement.  Walking 200 feet with physical therapy.    Telemetry: Sinus rhythm 70s  IV drips: None         Objective     Visit Vitals  /58 (BP Location: Left arm, Patient Position: Sitting)   Pulse 70   Temp 98.3 °F (36.8 °C) (Oral)   Resp 16   Ht 177 cm (69.69\")   Wt 111 kg (244 lb 6.4 oz)   SpO2 94%   BMI 35.39 kg/m²   Baseline weight 238 pounds    Intake/Output Summary (Last 24 hours) at 2025 0915  Last data filed at 2025 0737  Gross per 24 hour   Intake 480 ml   Output 3950 ml   Net -3470 ml     Left pleural drain: 360 mL in 24 hours, serosanguineous, no airleak    Lab:     CBC:  Results from last 7 days   Lab Units 25  0433 25  0507 25  0314   WBC 10*3/mm3 9.06 11.35* 9.53   HEMATOCRIT % 29.8* 29.4* 28.7*   PLATELETS 10*3/mm3 129* 110* 126*          BMP:  Results from last 7 days   Lab Units 25  0433 25  0507 25  0314   SODIUM mmol/L 134* 135* 140   POTASSIUM mmol/L 3.7 4.6 4.3   CHLORIDE mmol/L 101 105 109*   CO2 mmol/L 21.0* 22.0 21.0*   GLUCOSE mg/dL 122* 133* 157*   BUN mg/dL 12.5 11.8 12.7   CREATININE mg/dL 0.67* 0.74* 0.82          COAG:  Results from last 7 days   Lab Units 25  0507 25  0314 25  1318   INR  1.12*   < > 1.27*   APTT seconds  --   --  44.1*    < > = values in this interval not " displayed.       IMAGES:       Imaging Results (Last 24 Hours)       Procedure Component Value Units Date/Time    XR Chest PA & Lateral [[id number]] Collected: 06/19/25 0710     Updated: 06/19/25 0714    Narrative:      EXAM/TECHNIQUE: XR CHEST PA AND LATERAL-     INDICATION: s/p AVR CABG     COMPARISON: Prior day     FINDINGS:     Mediastinal drains have been removed. A left-sided chest tube remains in  place. Sternotomy wires are stable in alignment. Prosthetic heart valve  and atrial pinch device are again noted.     Cardiac silhouette is within normal limits and stable. No change in  small bilateral pleural effusions with overlying atelectasis. No visible  pneumothorax. No new opacities.       Impression:      Mediastinal drains have been removed. No other change in appearance of  the chest. Persistent small bilateral pleural effusions with overlying  atelectasis.     This report was signed and finalized on 6/19/2025 7:11 AM by Dr. Eb Osborn MD.             CXR: Chest tube in stable position with no pneumothorax.  Left basilar atelectasis.  Small bilateral pleural effusions    Physical Exam:  General: Alert, oriented. No apparent distress.   Cardiovascular: Regular rate and rhythm without murmur, rubs, or gallops.    Pulmonary: Clear to auscultation bilaterally without wheezing, rubs, or rales.  Chest: Sternotomy incision clean, dry, and intact. Sternum stable. No clicks.  Left chest tube to 20 cm suction. No air leak. Fluid is serosanguineous  Abdomen: Soft, nondistended, and nontender.  Extremities: Warm, moves all extremities. No edema. Saphenectomy site clean, dry, and intact  Neurologic:  Grossly intact with no focal deficits.            Impression:  Aortic valve stenosis  Coronary artery disease  Paroxysmal atrial fibrillation  Hyperlipidemia  Hypertension      Plan:  Continue, suppository again today  Discontinue Mccarthy catheter  Routine postcardiac surgery care  Encourage pulmonary toilet  ambulation  Keep chest tube as output is too high for removal  Discussed with patient and nursing      JUDE Urena  06/19/25  09:15 CDT

## 2025-06-19 NOTE — PLAN OF CARE
Goal Outcome Evaluation:   Mccarthy catheter removed, pt has voided since, as well as had a small bowel movement. Continuing to work with PT, gets winded easily. Had an episode with therapy, leaned against the wall and seemed unresponsive. When directed back to bed, vital signs were stable and LOC was normal again. Possible vagal-down.

## 2025-06-20 ENCOUNTER — APPOINTMENT (OUTPATIENT)
Dept: GENERAL RADIOLOGY | Facility: HOSPITAL | Age: 80
End: 2025-06-20
Payer: MEDICARE

## 2025-06-20 LAB
ANION GAP SERPL CALCULATED.3IONS-SCNC: 13 MMOL/L (ref 5–15)
APTT PPP: 38.4 SECONDS (ref 24.5–36)
BASOPHILS # BLD AUTO: 0.02 10*3/MM3 (ref 0–0.2)
BASOPHILS # BLD AUTO: 0.03 10*3/MM3 (ref 0–0.2)
BASOPHILS NFR BLD AUTO: 0.3 % (ref 0–1.5)
BASOPHILS NFR BLD AUTO: 0.5 % (ref 0–1.5)
BUN SERPL-MCNC: 15.6 MG/DL (ref 8–23)
BUN/CREAT SERPL: 20 (ref 7–25)
CALCIUM SPEC-SCNC: 8.3 MG/DL (ref 8.6–10.5)
CHLORIDE SERPL-SCNC: 101 MMOL/L (ref 98–107)
CO2 SERPL-SCNC: 22 MMOL/L (ref 22–29)
CREAT SERPL-MCNC: 0.78 MG/DL (ref 0.76–1.27)
DEPRECATED RDW RBC AUTO: 51.5 FL (ref 37–54)
DEPRECATED RDW RBC AUTO: 53 FL (ref 37–54)
EGFRCR SERPLBLD CKD-EPI 2021: 90.2 ML/MIN/1.73
EOSINOPHIL # BLD AUTO: 0.11 10*3/MM3 (ref 0–0.4)
EOSINOPHIL # BLD AUTO: 0.28 10*3/MM3 (ref 0–0.4)
EOSINOPHIL NFR BLD AUTO: 1.6 % (ref 0.3–6.2)
EOSINOPHIL NFR BLD AUTO: 4.8 % (ref 0.3–6.2)
ERYTHROCYTE [DISTWIDTH] IN BLOOD BY AUTOMATED COUNT: 15.8 % (ref 12.3–15.4)
ERYTHROCYTE [DISTWIDTH] IN BLOOD BY AUTOMATED COUNT: 15.9 % (ref 12.3–15.4)
FIBRINOGEN PPP-MCNC: 589 MG/DL (ref 240–460)
GLUCOSE BLDC GLUCOMTR-MCNC: 113 MG/DL (ref 70–130)
GLUCOSE BLDC GLUCOMTR-MCNC: 120 MG/DL (ref 70–130)
GLUCOSE BLDC GLUCOMTR-MCNC: 123 MG/DL (ref 70–130)
GLUCOSE BLDC GLUCOMTR-MCNC: 143 MG/DL (ref 70–130)
GLUCOSE SERPL-MCNC: 120 MG/DL (ref 65–99)
HCT VFR BLD AUTO: 25.9 % (ref 37.5–51)
HCT VFR BLD AUTO: 28 % (ref 37.5–51)
HGB BLD-MCNC: 8.9 G/DL (ref 13–17.7)
HGB BLD-MCNC: 9.3 G/DL (ref 13–17.7)
IMM GRANULOCYTES # BLD AUTO: 0.03 10*3/MM3 (ref 0–0.05)
IMM GRANULOCYTES # BLD AUTO: 0.06 10*3/MM3 (ref 0–0.05)
IMM GRANULOCYTES NFR BLD AUTO: 0.4 % (ref 0–0.5)
IMM GRANULOCYTES NFR BLD AUTO: 1 % (ref 0–0.5)
INR PPP: 0.96 (ref 0.91–1.09)
LYMPHOCYTES # BLD AUTO: 0.73 10*3/MM3 (ref 0.7–3.1)
LYMPHOCYTES # BLD AUTO: 1.05 10*3/MM3 (ref 0.7–3.1)
LYMPHOCYTES NFR BLD AUTO: 12.5 % (ref 19.6–45.3)
LYMPHOCYTES NFR BLD AUTO: 15.5 % (ref 19.6–45.3)
MAGNESIUM SERPL-MCNC: 2 MG/DL (ref 1.6–2.4)
MCH RBC QN AUTO: 30.1 PG (ref 26.6–33)
MCH RBC QN AUTO: 30.4 PG (ref 26.6–33)
MCHC RBC AUTO-ENTMCNC: 33.2 G/DL (ref 31.5–35.7)
MCHC RBC AUTO-ENTMCNC: 34.4 G/DL (ref 31.5–35.7)
MCV RBC AUTO: 88.4 FL (ref 79–97)
MCV RBC AUTO: 90.6 FL (ref 79–97)
MONOCYTES # BLD AUTO: 0.57 10*3/MM3 (ref 0.1–0.9)
MONOCYTES # BLD AUTO: 0.6 10*3/MM3 (ref 0.1–0.9)
MONOCYTES NFR BLD AUTO: 8.8 % (ref 5–12)
MONOCYTES NFR BLD AUTO: 9.7 % (ref 5–12)
NEUTROPHILS NFR BLD AUTO: 4.18 10*3/MM3 (ref 1.7–7)
NEUTROPHILS NFR BLD AUTO: 4.97 10*3/MM3 (ref 1.7–7)
NEUTROPHILS NFR BLD AUTO: 71.5 % (ref 42.7–76)
NEUTROPHILS NFR BLD AUTO: 73.4 % (ref 42.7–76)
NRBC BLD AUTO-RTO: 0 /100 WBC (ref 0–0.2)
NRBC BLD AUTO-RTO: 0.5 /100 WBC (ref 0–0.2)
PHOSPHATE SERPL-MCNC: 3.3 MG/DL (ref 2.5–4.5)
PLATELET # BLD AUTO: 140 10*3/MM3 (ref 140–450)
PLATELET # BLD AUTO: 159 10*3/MM3 (ref 140–450)
PMV BLD AUTO: 10.5 FL (ref 6–12)
PMV BLD AUTO: 10.8 FL (ref 6–12)
POTASSIUM SERPL-SCNC: 3.7 MMOL/L (ref 3.5–5.2)
PROTHROMBIN TIME: 13.3 SECONDS (ref 11.8–14.8)
QT INTERVAL: 402 MS
QT INTERVAL: 484 MS
QTC INTERVAL: 466 MS
QTC INTERVAL: 467 MS
RBC # BLD AUTO: 2.93 10*6/MM3 (ref 4.14–5.8)
RBC # BLD AUTO: 3.09 10*6/MM3 (ref 4.14–5.8)
SODIUM SERPL-SCNC: 136 MMOL/L (ref 136–145)
WBC NRBC COR # BLD AUTO: 5.85 10*3/MM3 (ref 3.4–10.8)
WBC NRBC COR # BLD AUTO: 6.78 10*3/MM3 (ref 3.4–10.8)

## 2025-06-20 PROCEDURE — 85384 FIBRINOGEN ACTIVITY: CPT | Performed by: NURSE PRACTITIONER

## 2025-06-20 PROCEDURE — 85730 THROMBOPLASTIN TIME PARTIAL: CPT | Performed by: NURSE PRACTITIONER

## 2025-06-20 PROCEDURE — 25010000002 ENOXAPARIN PER 10 MG: Performed by: NURSE PRACTITIONER

## 2025-06-20 PROCEDURE — 97530 THERAPEUTIC ACTIVITIES: CPT

## 2025-06-20 PROCEDURE — 85025 COMPLETE CBC W/AUTO DIFF WBC: CPT | Performed by: NURSE PRACTITIONER

## 2025-06-20 PROCEDURE — 71045 X-RAY EXAM CHEST 1 VIEW: CPT

## 2025-06-20 PROCEDURE — 85610 PROTHROMBIN TIME: CPT | Performed by: NURSE PRACTITIONER

## 2025-06-20 PROCEDURE — 82948 REAGENT STRIP/BLOOD GLUCOSE: CPT

## 2025-06-20 RX ORDER — AMIODARONE HYDROCHLORIDE 200 MG/1
200 TABLET ORAL 2 TIMES DAILY WITH MEALS
Status: DISCONTINUED | OUTPATIENT
Start: 2025-06-20 | End: 2025-06-26

## 2025-06-20 RX ADMIN — ACETAMINOPHEN 650 MG: 325 TABLET ORAL at 06:03

## 2025-06-20 RX ADMIN — ASPIRIN 81 MG: 81 TABLET, COATED ORAL at 08:38

## 2025-06-20 RX ADMIN — METOPROLOL TARTRATE 25 MG: 25 TABLET, FILM COATED ORAL at 21:39

## 2025-06-20 RX ADMIN — AMIODARONE HYDROCHLORIDE 200 MG: 200 TABLET ORAL at 08:37

## 2025-06-20 RX ADMIN — FAMOTIDINE 20 MG: 20 TABLET, FILM COATED ORAL at 08:34

## 2025-06-20 RX ADMIN — CITALOPRAM 5 MG: 20 TABLET, FILM COATED ORAL at 08:37

## 2025-06-20 RX ADMIN — POLYETHYLENE GLYCOL 3350 17 G: 17 POWDER, FOR SOLUTION ORAL at 08:38

## 2025-06-20 RX ADMIN — BISACODYL 10 MG: 5 TABLET, COATED ORAL at 08:36

## 2025-06-20 RX ADMIN — ENOXAPARIN SODIUM 40 MG: 100 INJECTION SUBCUTANEOUS at 08:38

## 2025-06-20 RX ADMIN — Medication 1 APPLICATION: at 17:15

## 2025-06-20 RX ADMIN — AMIODARONE HYDROCHLORIDE 200 MG: 200 TABLET ORAL at 17:14

## 2025-06-20 RX ADMIN — FAMOTIDINE 20 MG: 20 TABLET, FILM COATED ORAL at 17:15

## 2025-06-20 RX ADMIN — ATORVASTATIN CALCIUM 20 MG: 10 TABLET, FILM COATED ORAL at 21:39

## 2025-06-20 RX ADMIN — ACETAMINOPHEN 650 MG: 325 TABLET ORAL at 12:28

## 2025-06-20 RX ADMIN — Medication 1 APPLICATION: at 06:03

## 2025-06-20 RX ADMIN — CLOPIDOGREL BISULFATE 75 MG: 75 TABLET ORAL at 17:14

## 2025-06-20 RX ADMIN — ACETAMINOPHEN 650 MG: 325 TABLET ORAL at 17:14

## 2025-06-20 RX ADMIN — CHLORHEXIDINE GLUCONATE 1 APPLICATION: 500 CLOTH TOPICAL at 05:57

## 2025-06-20 RX ADMIN — METOPROLOL TARTRATE 25 MG: 25 TABLET, FILM COATED ORAL at 08:38

## 2025-06-20 RX ADMIN — ACETAMINOPHEN 650 MG: 325 TABLET ORAL at 01:26

## 2025-06-20 NOTE — PLAN OF CARE
Goal Outcome Evaluation:  Plan of Care Reviewed With: patient        Progress: no change  Outcome Evaluation: No c/o pain during the shift. Flipped to afib  and up to 120 per tele. Provider contacted and if rate controlled, continue current amiodarone orders BID. Pt ambulated to bathroom for partial shower and minimal c/o dizziness upon return to the chair. 100 mL output from chest tube. Safety maintained.

## 2025-06-20 NOTE — THERAPY TREATMENT NOTE
Acute Care - Physical Therapy Treatment Note  [facility]     Patient Name: Linden Montelongo  : 1945  MRN: [id number]  Today's Date: 2025      Visit Dx:     ICD-10-CM ICD-9-CM   1. Impaired mobility [Z74.09]  Z74.09 799.89   2. Coronary artery disease involving native coronary artery of native heart without angina pectoris  I25.10 414.01   3. Aortic valve stenosis  I35.0 424.1     Patient Active Problem List   Diagnosis    Coronary artery disease involving native coronary artery of native heart without angina pectoris    Aortic valve stenosis    Nonrheumatic aortic (valve) stenosis     Past Medical History:   Diagnosis Date    A-fib     Hyperlipidemia     Hypertension     LUKE on CPAP     Sleep apnea      Past Surgical History:   Procedure Laterality Date    AORTIC VALVE REPAIR/REPLACEMENT N/A 2025    Procedure: AORTIC VALVE REPLACEMENT 23MM, CORONARY ARTERY BYPASS GRAFTING X2, LEFT INTERNAL MAMMARY ARTERY GRAFT, RIGHT LEG ENDOSCOPIC VEIN HARVEST,  MAZE WITH ENCOMPASS CLAMP, CRYOABLATION, LEFT ATRIAL APPENDAGE EXCLUSION 40MM, TRANSESOPHAGEAL ECHOCARDIOGRAM;  Surgeon: Andrew Mcneil MD;  Location:  [facility];  Service: Cardiothoracic;  Laterality: N/A;    APPENDECTOMY      ATRIAL APPENDAGE EXCLUSION LEFT WITH TRANSESOPHAGEAL ECHOCARDIOGRAM N/A 2025    Procedure: ATRIAL APPENDAGE EXCLUSION LEFT WITH TRANSESOPHAGEAL ECHOCARDIOGRAM;  Surgeon: Andrew Mcneil MD;  Location:  [facility];  Service: Cardiothoracic;  Laterality: N/A;    CORONARY ARTERY BYPASS GRAFT N/A 2025    Procedure: CORONARY ARTERY BYPASS GRAFTING;  Surgeon: Andrew Mcneil MD;  Location:  [facility];  Service: Cardiothoracic;  Laterality: N/A;    ESOPHAGUS SURGERY      MAZE PROCEDURE N/A 2025    Procedure: MAZE PROCEDURE WITH ENCOMPASS CLAMP;  Surgeon: Andrew Mcneil MD;  Location:  [facility];  Service: Cardiothoracic;  Laterality: N/A;    REPLACEMENT TOTAL KNEE Right     TOTAL HIP ARTHROPLASTY Right      PT Assessment (Last 12  Hours)       PT Evaluation and Treatment       Row Name 06/20/25 1000          Physical Therapy Time and Intention    Subjective Information complains of;dizziness  after standing  -     Document Type therapy note (daily note)  -     Mode of Treatment physical therapy  -       Row Name 06/20/25 1000          General Information    Existing Precautions/Restrictions fall;sternal  chest tube  -       Row Name 06/20/25 1000          Bed Mobility    Rolling Left [place] (Bed Mobility) verbal cues;minimum assist (75% patient effort)  -     Sidelying-Sit [place] (Bed Mobility) verbal cues;contact guard  -     Sit-Sidelying [place] (Bed Mobility) verbal cues;contact guard  -       Row Name 06/20/25 1000          Sit-Stand Transfer    Sit-Stand [place] (Transfers) verbal cues;contact guard  -       Row Name 06/20/25 1000          Stand-Sit Transfer    Stand-Sit [place] (Transfers) verbal cues;contact guard  -       Row Name 06/20/25 1000          Gait/Stairs (Locomotion)    [place] Level (Gait) minimum assist (75% patient effort)  -     Patient was able to Ambulate --  Marched in olace 10 steps  -       Row Name 06/20/25 1000          Motor Skills    Comments, Therapeutic Exercise warm ups x 15  -       Row Name             Wound 06/16/25 0817 sternal Surgical    Wound - Properties Group Placement Date: 06/16/25  - Placement Time: 0817  -LH Present on Original Admission: N  -LH Location: sternal  -LH Primary Wound Type: Surgical  -LH Additional Comments: [facility]    Retired Wound - Properties Group Placement Date: 06/16/25  - Placement Time: 0817  -LH Present on Original Admission: N  -LH Location: sternal  -LH Additional Comments: [facility]    Retired Wound - Properties Group Placement Date: 06/16/25  - Placement Time: 0817  -LH Present on Original Admission: N  -LH Location: sternal  -LH Additional Comments: [facility]    Retired Wound - Properties Group  Date first assessed: 06/16/25 - Time first assessed: 0817  -LH Present on Original Admission: N  -LH Location: sternal  -LH Additional Comments: [facility]  -LH      Row Name             Wound 06/16/25 0817 Right anterior knee Surgical    Wound - Properties Group Placement Date: 06/16/25  -LH Placement Time: 0817  -LH Present on Original Admission: N  -LH Side: Right  -LH Orientation: anterior  -LH Location: knee  -LH Primary Wound Type: Surgical  -LH Additional Comments: mastisol, steri strips, mepilex, ace  -LH    Retired Wound - Properties Group Placement Date: 06/16/25 - Placement Time: 0817  -LH Present on Original Admission: N  -LH Side: Right  -LH Orientation: anterior  -LH Location: knee  -LH Additional Comments: mastisol, steri strips, mepilex, ace  -LH    Retired Wound - Properties Group Placement Date: 06/16/25 - Placement Time: 0817  -LH Present on Original Admission: N  -LH Side: Right  -LH Orientation: anterior  -LH Location: knee  -LH Additional Comments: mastisol, steri strips, mepilex, ace  -LH    Retired Wound - Properties Group Date first assessed: 06/16/25 - Time first assessed: 0817  -LH Present on Original Admission: N  -LH Side: Right  -LH Location: knee  -LH Additional Comments: mastisol, steri strips, mepilex, ace  -LH      Row Name             Wound 06/16/25 0817 Right anterior groin Surgical    Wound - Properties Group Placement Date: 06/16/25 -LH Placement Time: 0817  -LH Side: Right  -LH Orientation: anterior  -LH Location: groin  -LH Primary Wound Type: Surgical  -LH Additional Comments: mastisol, steri strips, mepilex, ace  -LH    Retired Wound - Properties Group Placement Date: 06/16/25 -LH Placement Time: 0817  -LH Side: Right  -LH Orientation: anterior  -LH Location: groin  -LH Additional Comments: mastisol, steri strips, mepilex, ace  -LH    Retired Wound - Properties Group Placement Date: 06/16/25 -LH Placement Time: 0817  -LH Side: Right  -LH Orientation: anterior   - Location: groin  - Additional Comments: mastisol, steri strips, mepilex, ace  -LH    Retired Wound - Properties Group Date first assessed: 06/16/25  - Time first assessed: 0817  - Side: Right  -LH Location: groin  -LH Additional Comments: mastisol, steri strips, mepilex, ace  -LH      Row Name 06/20/25 1000          Vital Signs    Pre Systolic BP Rehab 112  -KULWANT     Pre Treatment Diastolic BP 60  -KULWANT     Intra Systolic BP Rehab 88  -KULWANT     Intra Treatment Diastolic BP 48  -KULWANT     Post Systolic BP Rehab 84  -KULWANT     Post Treatment Diastolic BP 50  -KULWANT     Pretreatment Heart Rate (beats/min) 77  -KULWANT     Intratreatment Heart Rate (beats/min) 80  -KULWANT     Posttreatment Heart Rate (beats/min) 83  -KULWANT     Pre SpO2 (%) 96  -KULWANT     Intra SpO2 (%) 94  -KULWANT     Post SpO2 (%) 94  -KULWANT     O2 Delivery Post Treatment room air  -KULWANT     Post Patient Position Supine  -KULWANT       Row Name 06/20/25 1000          Positioning and Restraints    Pre-Treatment Position in bed  -KULWANT     Post Treatment Position bed  -KULWANT     In Bed notified nsg;fowlers;call light within reach  -KULWANT               User Key  (r) = Recorded By, (t) = Taken By, (c) = Cosigned By      Initials Name Provider Type     Vale Mack PTA Physical Therapist Assistant     Dania De La Rosa RN Registered Nurse                    Physical Therapy Education       Title: PT OT SLP Therapies (In Progress)       Topic: Physical Therapy (In Progress)       Point: Mobility training (In Progress)       Learning Progress Summary            Patient Acceptance, E,D, NR by KULWANT at 6/20/2025 1129    Comment: bed transfers    Acceptance, E, NR by GABRIELLA at 6/18/2025 1415    Comment: sternal prec, gait safety    Acceptance, E, VU,DU,NR by GABRIELLA at 6/18/2025 0800    Comment: benefits of activity, progression of PT, sternal prec    Acceptance, E, NR by SHAISTA at 6/17/2025 1438    Comment: continued sternal precautions, goal progression    Acceptance, E, VU by SHAISTA at 6/17/2025 1011    Comment:  role of PT, sternal precvautions, progressive mobility and ambuakltion                      Point: Home exercise program (In Progress)       Learning Progress Summary            Patient Acceptance, E, NR by SHAISTA at 6/17/2025 1438    Comment: continued sternal precautions, goal progression    Acceptance, E, VU by SHAITSA at 6/17/2025 1011    Comment: role of PT, sternal precvautions, progressive mobility and ambuakltion                      Point: Body mechanics (In Progress)       Learning Progress Summary            Patient Acceptance, E, NR by SHAISTA at 6/17/2025 1438    Comment: continued sternal precautions, goal progression    Acceptance, E, VU by SHAISTA at 6/17/2025 1011    Comment: role of PT, sternal precvautions, progressive mobility and ambuakltion                      Point: Precautions (Done)       Learning Progress Summary            Patient Acceptance, E, VU,DU,NR by GABRIELLA at 6/18/2025 0800    Comment: benefits of activity, progression of PT, sternal prec    Acceptance, E, NR by SHAISTA at 6/17/2025 1438    Comment: continued sternal precautions, goal progression    Acceptance, E, VU by SHAISTA at 6/17/2025 1011    Comment: role of PT, sternal precvautions, progressive mobility and ambuakltion                                      User Key       Initials Effective Dates Name Provider Type Discipline    GABRIELLA 02/03/23 -  Dereck Farias, PT DPT Physical Therapist PT    KULWANT 02/03/23 -  Vale Mack, PTA Physical Therapist Assistant PT     08/15/24 -  Andrew Palomino, TANGELA DPT Physical Therapist PT                  PT Recommendation and Plan         Outcome Measures       Row Name 06/20/25 1100 06/19/25 1100          How much help from another person do you currently need...    Turning from your back to your side while in flat bed without using bedrails? 3  -KULWANT 4  -KULWANT     Moving from lying on back to sitting on the side of a flat bed without bedrails? 3  -KULWANT 3  -KULWANT     Moving to and from a bed to a chair (including a wheelchair)? 3   -KULWANT 3  -KULWANT     Standing up from a chair using your arms (e.g., wheelchair, bedside chair)? 3  -KULWANT 3  -KULWANT     Climbing 3-5 steps with a railing? 3  -KULWANT 3  -KULWANT     To walk in hospital room? 3  -KULWANT 3  -KULWANT     AM-PAC 6 Clicks Score (PT) 18  -KULWANT 19  -KULWANT               User Key  (r) = Recorded By, (t) = Taken By, (c) = Cosigned By      Initials Name Provider Type    Vale Dunne PTA Physical Therapist Assistant                     Time Calculation:    PT Charges       Row Name 06/20/25 1132             Time Calculation    Start Time 1000  -KULWANT      Stop Time 1025  -KULWANT      Time Calculation (min) 25 min  -KULWANT         Timed Charges    56674 - PT Therapeutic Activity Minutes 25  -KULWANT         Total Minutes    Timed Charges Total Minutes 25  -KULWANT       Total Minutes 25  -KULWANT                User Key  (r) = Recorded By, (t) = Taken By, (c) = Cosigned By      Initials Name Provider Type    Vale Dunne PTA Physical Therapist Assistant                  Therapy Charges for Today       Code Description Service Date Service Provider Modifiers Qty    69259325118 HC GAIT TRAINING EA 15 MIN 6/19/2025 Vale Mack PTA GP 2    69908863646 HC GAIT TRAINING EA 15 MIN 6/19/2025 Vale Mack, GLORIA GP 1    06526508313 HC PT THERAPEUTIC ACT EA 15 MIN 6/20/2025 Vale Mack PTA GP 2            PT G-Codes  Outcome Measure Options: AM-PAC 6 Clicks Basic Mobility (PT)  AM-PAC 6 Clicks Score (PT): 18    Vale Mack PTA  6/20/2025

## 2025-06-20 NOTE — PROGRESS NOTES
"Patient name: Linden Montelongo  Patient : 1945  VISIT # [id number]  MR #[id number]    Procedure:Procedure(s):  AORTIC VALVE REPLACEMENT 23MM, CORONARY ARTERY BYPASS GRAFTING X2, LEFT INTERNAL MAMMARY ARTERY GRAFT, RIGHT LEG ENDOSCOPIC VEIN HARVEST,  MAZE WITH ENCOMPASS CLAMP, CRYOABLATION, LEFT ATRIAL APPENDAGE EXCLUSION 40MM, TRANSESOPHAGEAL ECHOCARDIOGRAM  CORONARY ARTERY BYPASS GRAFTING  MAZE PROCEDURE WITH ENCOMPASS CLAMP  ATRIAL APPENDAGE EXCLUSION LEFT WITH TRANSESOPHAGEAL ECHOCARDIOGRAM  Procedure Date:2025  POD:4 Days Post-Op    Subjective     The patient is resting in bed on 2 L nasal cannula, O2 sat 99%, heart rate 72.  He is awake and alert. (+BM).  He has not yet ambulated this AM.  He is up 2lbs from yesterday and 8lbs above baseline weight.     Overnight events reviewed and nursing notes patient had a rapid response overnight when getting up to use the restroom.  It is reported that he began feeling weak, lethargic, and then unresponsive.  Patient was assisted to the floor.  Hospitalist and rapid response team at bedside.  EKG completed and showed no abnormalities.  Vitals were stable.  Blood sugar was 112.  After approximately 5 minutes the patient looked around and asked \"am I on the floor\".  He was then noted to be completely neurologically intact.  He was helped back to bed.  It is noted that cardiology was notified of the episode.  He did have episode of unresponsiveness during afternoon walk with PT as well.     Telemetry: Sinus rhythm 70s  IV drips: None         Objective     Visit Vitals  /60 (BP Location: Right arm, Patient Position: Lying)   Pulse 72   Temp 97.7 °F (36.5 °C) (Oral)   Resp 18   Ht 177 cm (69.69\")   Wt 112 kg (246 lb) Comment: bed weight due to patient's previous episode of syncope   SpO2 99%   BMI 35.62 kg/m²   Baseline weight 238 pounds    Intake/Output Summary (Last 24 hours) at 2025 0942  Last data filed at 2025 0600  Gross per 24 hour   Intake 240 " ml   Output 1840 ml   Net -1600 ml   Left pleural drain: 360 mL in 24 hours, serosanguineous, no airleak    Lab:     CBC:  Results from last 7 days   Lab Units 06/19/25  2359 06/19/25  0433 06/18/25  0507   WBC 10*3/mm3 6.78 9.06 11.35*   HEMATOCRIT % 28.0* 29.8* 29.4*   PLATELETS 10*3/mm3 140 129* 110*          BMP:  Results from last 7 days   Lab Units 06/19/25 2359 06/19/25  0433 06/18/25  0507   SODIUM mmol/L 136 134* 135*   POTASSIUM mmol/L 3.7 3.7 4.6   CHLORIDE mmol/L 101 101 105   CO2 mmol/L 22.0 21.0* 22.0   GLUCOSE mg/dL 120* 122* 133*   BUN mg/dL 15.6 12.5 11.8   CREATININE mg/dL 0.78 0.67* 0.74*          COAG:  Results from last 7 days   Lab Units 06/18/25  0507 06/17/25  0314 06/16/25  1318   INR  1.12*   < > 1.27*   APTT seconds  --   --  44.1*    < > = values in this interval not displayed.       IMAGES:       Imaging Results (Last 24 Hours)       Procedure Component Value Units Date/Time    XR Chest 1 View [[id number]] Collected: 06/20/25 0641     Updated: 06/20/25 0645    Narrative:      EXAM/TECHNIQUE: XR CHEST 1 VW-     INDICATION: Post-Op Heart Surgery     COMPARISON: Prior day     FINDINGS:     Left-sided chest tube is stable in position. Sternotomy wires are stable  in alignment. Prosthetic heart valve and atrial appendage device are  again noted.     Cardiac silhouette is prominent but stable. No change in small bilateral  pleural effusions with overlying atelectasis. A small left apical  pneumothorax is visible.       Impression:      1.  Small left apical pneumothorax with left-sided chest tube in place.  2.  No change in small bilateral pleural effusions with overlying  atelectasis.     This report was signed and finalized on 6/20/2025 6:42 AM by Dr. Eb Osborn MD.             CXR: Chest tube in stable position with small left apical pneumo. Small bilat pleural effusions.     Physical Exam:  General: Alert, oriented. No apparent distress.   Cardiovascular: Regular rate and rhythm  without murmur, rubs, or gallops.    Pulmonary: Clear to auscultation bilaterally without wheezing, rubs, or rales.  Chest: Sternotomy incision clean, dry, and intact. Sternum stable. No clicks.  Left chest tube to 20 cm suction. No air leak. Fluid is serosanguineous  Abdomen: Soft, nondistended, and nontender.  Extremities: Warm, moves all extremities. No edema. Saphenectomy site clean, dry, and intact  Neurologic:  Grossly intact with no focal deficits.          Impression:  Aortic valve stenosis  Coronary artery disease  Paroxysmal atrial fibrillation  Hyperlipidemia  Hypertension    Plan:  Hold diuresis.  Obtain f/u CXR this afternoon.   Routine postcardiac surgery care  Encourage pulmonary toilet ambulation  Keep chest tube as output is too high for removal  Discussed with patient and nursing      Maryann Mohan, APRN  06/20/25  09:42 CDT

## 2025-06-20 NOTE — PLAN OF CARE
Problem: Adult Inpatient Plan of Care  Goal: Plan of Care Review  Outcome: Progressing  Flowsheets (Taken 6/20/2025 0550)  Progress: no change  Outcome Evaluation: Patient slept through night with no c/o pain. Rapid was called due to episode as stated and explained in previous nursing note. Daughter was notified of the encounter at  patient's request. No episodes since. Patient is AOx4 and is using the urinal in bed. VSS. S66-72 per tele. MD aware of episode pt experienced last night. Incisions sites C/D/I. Adequate chest tube and urine output. Previna in place. Safety maintained and call light within reach.  Plan of Care Reviewed With: patient  Goal: Patient-Specific Goal (Individualized)  Outcome: Progressing  Goal: Absence of Hospital-Acquired Illness or Injury  Outcome: Progressing  Intervention: Identify and Manage Fall Risk  Recent Flowsheet Documentation  Taken 6/20/2025 0000 by Macie Benitez RN  Safety Promotion/Fall Prevention:   nonskid shoes/slippers when out of bed   gait belt   safety round/check completed  Taken 6/19/2025 2200 by Macie Benitez RN  Safety Promotion/Fall Prevention: safety round/check completed  Taken 6/19/2025 2100 by Macie Benitez RN  Safety Promotion/Fall Prevention: safety round/check completed  Taken 6/19/2025 2000 by Macie Benitez RN  Safety Promotion/Fall Prevention: safety round/check completed  Taken 6/19/2025 1900 by Macie Benitez RN  Safety Promotion/Fall Prevention: safety round/check completed  Intervention: Prevent Skin Injury  Recent Flowsheet Documentation  Taken 6/20/2025 0400 by Macie Benitez RN  Body Position: position changed independently  Taken 6/20/2025 0200 by Macie Benitez RN  Body Position: position changed independently  Taken 6/19/2025 2200 by Macie Benitez RN  Body Position: position changed independently  Taken 6/19/2025 2100 by Macie Benitez RN  Body Position: position changed independently  Taken 6/19/2025 2000 by Macie Benitez RN  Body Position: position changed  independently  Taken 6/19/2025 1900 by Macie Benitez RN  Body Position: position changed independently  Intervention: Prevent and Manage VTE (Venous Thromboembolism) Risk  Recent Flowsheet Documentation  Taken 6/19/2025 2000 by Macie Benitez RN  VTE Prevention/Management: (see mar) other (see comments)  Goal: Optimal Comfort and Wellbeing  Outcome: Progressing  Intervention: Provide Person-Centered Care  Recent Flowsheet Documentation  Taken 6/19/2025 2000 by Macie Benitez RN  Trust Relationship/Rapport:   care explained   choices provided   thoughts/feelings acknowledged  Goal: Readiness for Transition of Care  Outcome: Progressing     Problem: Skin Injury Risk Increased  Goal: Skin Health and Integrity  Outcome: Progressing  Intervention: Optimize Skin Protection  Recent Flowsheet Documentation  Taken 6/19/2025 2000 by Macie Benitez RN  Activity Management: activity encouraged     Problem: Fall Injury Risk  Goal: Absence of Fall and Fall-Related Injury  Outcome: Progressing  Intervention: Promote Injury-Free Environment  Recent Flowsheet Documentation  Taken 6/20/2025 0000 by Macie Benitez RN  Safety Promotion/Fall Prevention:   nonskid shoes/slippers when out of bed   gait belt   safety round/check completed  Taken 6/19/2025 2200 by Macie Benitez RN  Safety Promotion/Fall Prevention: safety round/check completed  Taken 6/19/2025 2100 by Macie Benitez RN  Safety Promotion/Fall Prevention: safety round/check completed  Taken 6/19/2025 2000 by Macie Benitez RN  Safety Promotion/Fall Prevention: safety round/check completed  Taken 6/19/2025 1900 by Macie Benitez RN  Safety Promotion/Fall Prevention: safety round/check completed   Goal Outcome Evaluation:  Plan of Care Reviewed With: patient        Progress: no change  Outcome Evaluation: Patient slept through night with no c/o pain. Rapid was called due to episode as stated and explained in previous nursing note. Daughter was notified of the encounter at  patient's request.  No episodes since. Patient is AOx4 and is using the urinal in bed. VSS. S66-72 per tele. MD aware of episode pt experienced last night. Incisions sites C/D/I. Adequate chest tube and urine output. Previna in place. Safety maintained and call light within reach.

## 2025-06-20 NOTE — PLAN OF CARE
Goal Outcome Evaluation:         Patient was CGA for side lying to sit. No dizziness. Worked thru warm up exercises. /60. Stood. NO dizziness for 1 minute. Marched in place for 10 steps, dizziness began. BP 88/48. SAT. BP 84.50. Patient required min assist for sit to side lying. Nursing aware.

## 2025-06-20 NOTE — NURSING NOTE
Patient got up to use restroom and became very lethargic, unresponsive, and began feeling weak. Tech assisted pt to the floor and pressed staff assist button.  Team arrived to room to find pt on back on the floor. Pt was unresponsive, pale, and head was stiff with air escaping. Rapid response was called and team and Polid[place] responded. EKG was done and showed no abnormalities. Vitals were stable. BS was 112. After an estimated 5 minutes, patient looked around and asked 'Am I in the floor?'. After this, he was able to answer orientation questions perfectly. MD ordered labs to be drawn. Got patient back in bed with assistance. Received no injuries from fall and had no c/o pain or discomfort. Cardiology was called and made aware of this episode with no new orders at this time.

## 2025-06-21 ENCOUNTER — APPOINTMENT (OUTPATIENT)
Dept: GENERAL RADIOLOGY | Facility: HOSPITAL | Age: 80
End: 2025-06-21
Payer: MEDICARE

## 2025-06-21 LAB
ANION GAP SERPL CALCULATED.3IONS-SCNC: 12 MMOL/L (ref 5–15)
BASOPHILS # BLD AUTO: 0.03 10*3/MM3 (ref 0–0.2)
BASOPHILS NFR BLD AUTO: 0.5 % (ref 0–1.5)
BUN SERPL-MCNC: 14.7 MG/DL (ref 8–23)
BUN/CREAT SERPL: 20.1 (ref 7–25)
CALCIUM SPEC-SCNC: 8.5 MG/DL (ref 8.6–10.5)
CHLORIDE SERPL-SCNC: 100 MMOL/L (ref 98–107)
CO2 SERPL-SCNC: 23 MMOL/L (ref 22–29)
CREAT SERPL-MCNC: 0.73 MG/DL (ref 0.76–1.27)
DEPRECATED RDW RBC AUTO: 52.4 FL (ref 37–54)
EGFRCR SERPLBLD CKD-EPI 2021: 92 ML/MIN/1.73
EOSINOPHIL # BLD AUTO: 0.32 10*3/MM3 (ref 0–0.4)
EOSINOPHIL NFR BLD AUTO: 5.1 % (ref 0.3–6.2)
ERYTHROCYTE [DISTWIDTH] IN BLOOD BY AUTOMATED COUNT: 15.9 % (ref 12.3–15.4)
GLUCOSE BLDC GLUCOMTR-MCNC: 110 MG/DL (ref 70–130)
GLUCOSE BLDC GLUCOMTR-MCNC: 125 MG/DL (ref 70–130)
GLUCOSE BLDC GLUCOMTR-MCNC: 126 MG/DL (ref 70–130)
GLUCOSE BLDC GLUCOMTR-MCNC: 136 MG/DL (ref 70–130)
GLUCOSE SERPL-MCNC: 111 MG/DL (ref 65–99)
HCT VFR BLD AUTO: 26.9 % (ref 37.5–51)
HGB BLD-MCNC: 9.1 G/DL (ref 13–17.7)
IMM GRANULOCYTES # BLD AUTO: 0.04 10*3/MM3 (ref 0–0.05)
IMM GRANULOCYTES NFR BLD AUTO: 0.6 % (ref 0–0.5)
LYMPHOCYTES # BLD AUTO: 0.79 10*3/MM3 (ref 0.7–3.1)
LYMPHOCYTES NFR BLD AUTO: 12.5 % (ref 19.6–45.3)
MAGNESIUM SERPL-MCNC: 2.1 MG/DL (ref 1.6–2.4)
MCH RBC QN AUTO: 30.4 PG (ref 26.6–33)
MCHC RBC AUTO-ENTMCNC: 33.8 G/DL (ref 31.5–35.7)
MCV RBC AUTO: 90 FL (ref 79–97)
MONOCYTES # BLD AUTO: 0.58 10*3/MM3 (ref 0.1–0.9)
MONOCYTES NFR BLD AUTO: 9.2 % (ref 5–12)
NEUTROPHILS NFR BLD AUTO: 4.57 10*3/MM3 (ref 1.7–7)
NEUTROPHILS NFR BLD AUTO: 72.1 % (ref 42.7–76)
NRBC BLD AUTO-RTO: 0.3 /100 WBC (ref 0–0.2)
PLATELET # BLD AUTO: 170 10*3/MM3 (ref 140–450)
PMV BLD AUTO: 10.6 FL (ref 6–12)
POTASSIUM SERPL-SCNC: 3.7 MMOL/L (ref 3.5–5.2)
RBC # BLD AUTO: 2.99 10*6/MM3 (ref 4.14–5.8)
SODIUM SERPL-SCNC: 135 MMOL/L (ref 136–145)
WBC NRBC COR # BLD AUTO: 6.33 10*3/MM3 (ref 3.4–10.8)

## 2025-06-21 PROCEDURE — 71045 X-RAY EXAM CHEST 1 VIEW: CPT

## 2025-06-21 PROCEDURE — 82948 REAGENT STRIP/BLOOD GLUCOSE: CPT

## 2025-06-21 PROCEDURE — 25010000002 ENOXAPARIN PER 10 MG: Performed by: NURSE PRACTITIONER

## 2025-06-21 PROCEDURE — 80048 BASIC METABOLIC PNL TOTAL CA: CPT | Performed by: NURSE PRACTITIONER

## 2025-06-21 PROCEDURE — 25810000003 SODIUM CHLORIDE 0.9 % SOLUTION: Performed by: NURSE PRACTITIONER

## 2025-06-21 PROCEDURE — 85025 COMPLETE CBC W/AUTO DIFF WBC: CPT | Performed by: THORACIC SURGERY (CARDIOTHORACIC VASCULAR SURGERY)

## 2025-06-21 PROCEDURE — 83735 ASSAY OF MAGNESIUM: CPT | Performed by: NURSE PRACTITIONER

## 2025-06-21 PROCEDURE — 97116 GAIT TRAINING THERAPY: CPT

## 2025-06-21 RX ORDER — OXYCODONE AND ACETAMINOPHEN 5; 325 MG/1; MG/1
1 TABLET ORAL ONCE
Refills: 0 | Status: COMPLETED | OUTPATIENT
Start: 2025-06-21 | End: 2025-06-21

## 2025-06-21 RX ADMIN — POLYETHYLENE GLYCOL 3350 17 G: 17 POWDER, FOR SOLUTION ORAL at 08:31

## 2025-06-21 RX ADMIN — CLOPIDOGREL BISULFATE 75 MG: 75 TABLET ORAL at 17:14

## 2025-06-21 RX ADMIN — AMIODARONE HYDROCHLORIDE 200 MG: 200 TABLET ORAL at 08:29

## 2025-06-21 RX ADMIN — BISACODYL 10 MG: 5 TABLET, COATED ORAL at 08:29

## 2025-06-21 RX ADMIN — ACETAMINOPHEN 650 MG: 325 TABLET ORAL at 11:05

## 2025-06-21 RX ADMIN — ATORVASTATIN CALCIUM 20 MG: 10 TABLET, FILM COATED ORAL at 20:29

## 2025-06-21 RX ADMIN — ACETAMINOPHEN 650 MG: 325 TABLET ORAL at 00:10

## 2025-06-21 RX ADMIN — ACETAMINOPHEN 650 MG: 325 TABLET ORAL at 17:14

## 2025-06-21 RX ADMIN — OXYCODONE HYDROCHLORIDE AND ACETAMINOPHEN 1 TABLET: 5; 325 TABLET ORAL at 19:28

## 2025-06-21 RX ADMIN — OXYCODONE HYDROCHLORIDE 5 MG: 5 TABLET ORAL at 18:34

## 2025-06-21 RX ADMIN — METOPROLOL TARTRATE 25 MG: 25 TABLET, FILM COATED ORAL at 20:29

## 2025-06-21 RX ADMIN — Medication 1 APPLICATION: at 05:41

## 2025-06-21 RX ADMIN — METOPROLOL TARTRATE 25 MG: 25 TABLET, FILM COATED ORAL at 08:29

## 2025-06-21 RX ADMIN — ENOXAPARIN SODIUM 40 MG: 100 INJECTION SUBCUTANEOUS at 08:29

## 2025-06-21 RX ADMIN — AMIODARONE HYDROCHLORIDE 200 MG: 200 TABLET ORAL at 17:14

## 2025-06-21 RX ADMIN — ACETAMINOPHEN 650 MG: 325 TABLET ORAL at 23:17

## 2025-06-21 RX ADMIN — ASPIRIN 81 MG: 81 TABLET, COATED ORAL at 08:29

## 2025-06-21 RX ADMIN — FAMOTIDINE 20 MG: 20 TABLET, FILM COATED ORAL at 17:14

## 2025-06-21 RX ADMIN — SODIUM CHLORIDE 1000 ML: 9 INJECTION, SOLUTION INTRAVENOUS at 11:05

## 2025-06-21 RX ADMIN — ACETAMINOPHEN 650 MG: 325 TABLET ORAL at 05:40

## 2025-06-21 RX ADMIN — FAMOTIDINE 20 MG: 20 TABLET, FILM COATED ORAL at 08:29

## 2025-06-21 NOTE — PLAN OF CARE
Problem: Adult Inpatient Plan of Care  Goal: Plan of Care Review  Outcome: Progressing  Flowsheets  Taken 6/21/2025 0623 by Macie Benitez RN  Progress: no change  Outcome Evaluation: Pt slept through night. C/o pain around midnight when tylenol was due which was effective per pt report. Minimal chest tube output (10mL) through night. VSS. S 63-77 per tele. Safety maintained and call light within reach.  Taken 6/20/2025 1624 by Dilcia Spence RN  Plan of Care Reviewed With: patient  Goal: Patient-Specific Goal (Individualized)  Outcome: Progressing  Goal: Absence of Hospital-Acquired Illness or Injury  Outcome: Progressing  Intervention: Identify and Manage Fall Risk  Recent Flowsheet Documentation  Taken 6/21/2025 0000 by Macie Benitez RN  Safety Promotion/Fall Prevention: safety round/check completed  Taken 6/20/2025 2100 by Macie Benitez RN  Safety Promotion/Fall Prevention: safety round/check completed  Taken 6/20/2025 2000 by Macie Benitez RN  Safety Promotion/Fall Prevention: safety round/check completed  Intervention: Prevent Skin Injury  Recent Flowsheet Documentation  Taken 6/21/2025 0200 by Macie Benitez RN  Body Position: position changed independently  Taken 6/21/2025 0000 by Macie Benitez RN  Body Position: position changed independently  Taken 6/20/2025 2200 by Macie Benitez RN  Body Position: position changed independently  Taken 6/20/2025 2100 by Macie Benitez RN  Body Position: position changed independently  Taken 6/20/2025 2000 by Macie Benitez RN  Body Position: position changed independently  Taken 6/20/2025 1900 by Macie Benitez RN  Body Position: position changed independently  Intervention: Prevent and Manage VTE (Venous Thromboembolism) Risk  Recent Flowsheet Documentation  Taken 6/20/2025 2100 by Macie Benitez RN  VTE Prevention/Management: (see mar) other (see comments)  Goal: Optimal Comfort and Wellbeing  Outcome: Progressing  Intervention: Provide Person-Centered Care  Recent Flowsheet  Documentation  Taken 6/20/2025 2100 by Macie Benitez RN  Trust Relationship/Rapport:   care explained   choices provided   emotional support provided   thoughts/feelings acknowledged   questions encouraged  Goal: Readiness for Transition of Care  Outcome: Progressing     Problem: Skin Injury Risk Increased  Goal: Skin Health and Integrity  Outcome: Progressing     Problem: Fall Injury Risk  Goal: Absence of Fall and Fall-Related Injury  Outcome: Progressing  Intervention: Promote Injury-Free Environment  Recent Flowsheet Documentation  Taken 6/21/2025 0000 by Macie Benitez RN  Safety Promotion/Fall Prevention: safety round/check completed  Taken 6/20/2025 2100 by Macie Benitez RN  Safety Promotion/Fall Prevention: safety round/check completed  Taken 6/20/2025 2000 by Macie Benitez RN  Safety Promotion/Fall Prevention: safety round/check completed   Goal Outcome Evaluation:  Plan of Care Reviewed With: patient        Progress: no change  Outcome Evaluation: Pt slept through night. C/o pain around midnight when tylenol was due which was effective per pt report. Minimal chest tube output (10mL) through night. VSS. S 63-77 per tele. Safety maintained and call light within reach.

## 2025-06-21 NOTE — PLAN OF CARE
Goal Outcome Evaluation:  Plan of Care Reviewed With: patient        Progress: improving  Outcome Evaluation: PT tx completed. Nsg requests PTA to perform orthos. Values are charted in flowsheet note. Positive ortho reading noted. Starting /54 with pt fully reclined in chair, once in standing BP 86/43 with mild dizziness noted. HR maintained 75 in all positions. After rest break pt did amb in room with CGA and standing rest breaks, 75' total. Pt reports no dizziness with ambulation. Nsg notified. will follow.

## 2025-06-21 NOTE — THERAPY TREATMENT NOTE
Acute Care - Physical Therapy Treatment Note  [facility]     Patient Name: Linden Montelongo  : 1945  MRN: [id number]  Today's Date: 2025      Visit Dx:     ICD-10-CM ICD-9-CM   1. Impaired mobility [Z74.09]  Z74.09 799.89   2. Coronary artery disease involving native coronary artery of native heart without angina pectoris  I25.10 414.01   3. Aortic valve stenosis  I35.0 424.1     Patient Active Problem List   Diagnosis    Coronary artery disease involving native coronary artery of native heart without angina pectoris    Aortic valve stenosis    Nonrheumatic aortic (valve) stenosis     Past Medical History:   Diagnosis Date    A-fib     Hyperlipidemia     Hypertension     LUKE on CPAP     Sleep apnea      Past Surgical History:   Procedure Laterality Date    AORTIC VALVE REPAIR/REPLACEMENT N/A 2025    Procedure: AORTIC VALVE REPLACEMENT 23MM, CORONARY ARTERY BYPASS GRAFTING X2, LEFT INTERNAL MAMMARY ARTERY GRAFT, RIGHT LEG ENDOSCOPIC VEIN HARVEST,  MAZE WITH ENCOMPASS CLAMP, CRYOABLATION, LEFT ATRIAL APPENDAGE EXCLUSION 40MM, TRANSESOPHAGEAL ECHOCARDIOGRAM;  Surgeon: Andrew Mcneil MD;  Location:  [facility];  Service: Cardiothoracic;  Laterality: N/A;    APPENDECTOMY      ATRIAL APPENDAGE EXCLUSION LEFT WITH TRANSESOPHAGEAL ECHOCARDIOGRAM N/A 2025    Procedure: ATRIAL APPENDAGE EXCLUSION LEFT WITH TRANSESOPHAGEAL ECHOCARDIOGRAM;  Surgeon: Adnrew Mcneil MD;  Location:  [facility];  Service: Cardiothoracic;  Laterality: N/A;    CORONARY ARTERY BYPASS GRAFT N/A 2025    Procedure: CORONARY ARTERY BYPASS GRAFTING;  Surgeon: Andrew Mcneil MD;  Location:  [facility];  Service: Cardiothoracic;  Laterality: N/A;    ESOPHAGUS SURGERY      MAZE PROCEDURE N/A 2025    Procedure: MAZE PROCEDURE WITH ENCOMPASS CLAMP;  Surgeon: Andrew Mcneil MD;  Location:  [facility];  Service: Cardiothoracic;  Laterality: N/A;    REPLACEMENT TOTAL KNEE Right     TOTAL HIP ARTHROPLASTY Right      PT Assessment (Last 12  Hours)       PT Evaluation and Treatment       Row Name 06/21/25 0908          Physical Therapy Time and Intention    Subjective Information complains of;dizziness  -LY     Document Type therapy note (daily note)  -LY     Mode of Treatment physical therapy  -LY     Comment Completed orthos prior to ambulation, decrease in BP with standing. Nsg aware. Values charted in flowsheet.  -LY       Row Name 06/21/25 0908          General Information    Existing Precautions/Restrictions fall;sternal  chest tube  -LY       Row Name 06/21/25 0908          Pain    Pretreatment Pain Rating 3/10  -LY     Posttreatment Pain Rating 3/10  -LY     Pain Location chest  -LY     Pain Management Interventions exercise or physical activity utilized  -LY     Response to Pain Interventions activity participation with tolerable pain  -LY       Row Name 06/21/25 0908          Bed Mobility    Comment, (Bed Mobility) chair  -LY       Row Name 06/21/25 0908          Sit-Stand Transfer    Sit-Stand [place] (Transfers) verbal cues;contact guard  -LY       Row Name 06/21/25 0908          Stand-Sit Transfer    Stand-Sit [place] (Transfers) verbal cues;contact guard  -LY       Row Name 06/21/25 0908          Gait/Stairs (Locomotion)    [place] Level (Gait) minimum assist (75% patient effort)  -LY     Distance in Feet (Gait) 75  with 3 standing rest breaks, stayed in room per nsg  -LY     Deviations/Abnormal Patterns (Gait) gait speed decreased  -LY     Bilateral Gait Deviations forward flexed posture  -LY     Comment, (Gait/Stairs) no reports of dizziness with ambulation, only static standing  -LY       Row Name 06/21/25 0908          Motor Skills    Comments, Therapeutic Exercise cardiac protocol  -LY       Row Name             Wound 06/16/25 0817 sternal Surgical    Wound - Properties Group Placement Date: 06/16/25  - Placement Time: 0817  - Present on Original Admission: N  -LH Location: sternal  -LH Primary Wound Type: Surgical   -LH Additional Comments: Prevena  -LH    Retired Wound - Properties Group Placement Date: 06/16/25  -LH Placement Time: 0817  -LH Present on Original Admission: N  -LH Location: sternal  -LH Additional Comments: [place]vena  -LH    Retired Wound - Properties Group Placement Date: 06/16/25  -LH Placement Time: 0817  -LH Present on Original Admission: N  -LH Location: sternal  -LH Additional Comments: Prevena  -LH    Retired Wound - Properties Group Date first assessed: 06/16/25  - Time first assessed: 0817  -LH Present on Original Admission: N  -LH Location: sternal  -LH Additional Comments: Prevena  -LH      Row Name             Wound 06/16/25 0817 Right anterior knee Surgical    Wound - Properties Group Placement Date: 06/16/25  -LH Placement Time: 0817  -LH Present on Original Admission: N  -LH Side: Right  -LH Orientation: anterior  -LH Location: knee  -LH Primary Wound Type: Surgical  -LH Additional Comments: mastisol, steri strips, mepilex, ace  -LH    Retired Wound - Properties Group Placement Date: 06/16/25  - Placement Time: 0817  -LH Present on Original Admission: N  -LH Side: Right  -LH Orientation: anterior  -LH Location: knee  -LH Additional Comments: mastisol, steri strips, mepilex, ace  -LH    Retired Wound - Properties Group Placement Date: 06/16/25  - Placement Time: 0817  -LH Present on Original Admission: N  -LH Side: Right  -LH Orientation: anterior  -LH Location: knee  -LH Additional Comments: mastisol, steri strips, mepilex, ace  -LH    Retired Wound - Properties Group Date first assessed: 06/16/25  - Time first assessed: 0817  -LH Present on Original Admission: N  -LH Side: Right  -LH Location: knee  -LH Additional Comments: mastisol, steri strips, mepilex, ace  -LH      Row Name             Wound 06/16/25 0817 Right anterior groin Surgical    Wound - Properties Group Placement Date: 06/16/25  -LH Placement Time: 0817  -LH Side: Right  -LH Orientation: anterior  -LH Location: groin  -LH  Primary Wound Type: Surgical  -LH Additional Comments: mastisol, steri strips, mepilex, ace  -LH    Retired Wound - Properties Group Placement Date: 06/16/25 -LH Placement Time: 0817 -LH Side: Right  -LH Orientation: anterior  -LH Location: groin  -LH Additional Comments: mastisol, steri strips, mepilex, ace  -LH    Retired Wound - Properties Group Placement Date: 06/16/25 -LH Placement Time: 0817 -LH Side: Right  -LH Orientation: anterior  -LH Location: groin  -LH Additional Comments: mastisol, steri strips, mepilex, ace  -LH    Retired Wound - Properties Group Date first assessed: 06/16/25 -LH Time first assessed: 0817 -LH Side: Right  -LH Location: groin  -LH Additional Comments: mastisol, steri strips, mepilex, ace  -LH      Row Name 06/21/25 0908          Plan of Care Review    Plan of Care Reviewed With patient  -LY     Progress improving  -LY     Outcome Evaluation PT tx completed. Nsg requests PTA to perform orthos. Values are charted in flowsheet note. Positive ortho reading noted. Starting /54 with pt fully reclined in chair, once in standing BP 86/43 with mild dizziness noted. HR maintained 75 in all positions. After rest break pt did amb in room with CGA and standing rest breaks, 75' total. Pt reports no dizziness with ambulation. Nsg notified. will follow.  -LY       Row Name 06/21/25 0908          Vital Signs    Pre Systolic BP Rehab 110  -LY     Pre Treatment Diastolic BP 54  -LY     Intra Systolic BP Rehab 102  -LY     Intra Treatment Diastolic BP 59  -LY     Post Systolic BP Rehab 86  -LY     Post Treatment Diastolic BP 43  -LY     Pretreatment Heart Rate (beats/min) 75  -LY     Intratreatment Heart Rate (beats/min) 75  -LY     Posttreatment Heart Rate (beats/min) 75  -LY     Pre Patient Position Supine  fully reclined in chair  -LY     Intra Patient Position Sitting  -LY     Post Patient Position Standing  -LY     Vitals Comment mild dizziness with static standing  -LY       Row Name  06/21/25 0908          Positioning and Restraints    Pre-Treatment Position sitting in chair/recliner  -LY     Post Treatment Position chair  -LY     In Chair reclined;call light within reach;encouraged to call for assist  -LY               User Key  (r) = Recorded By, (t) = Taken By, (c) = Cosigned By      Initials Name Provider Type    Dania Ge, RN Registered Nurse    Pippa Boyle, GLORIA Physical Therapist Assistant                    Physical Therapy Education       Title: PT OT SLP Therapies (In Progress)       Topic: Physical Therapy (In Progress)       Point: Mobility training (In Progress)       Learning Progress Summary            Patient Acceptance, E,D, NR by KULWANT at 6/20/2025 1129    Comment: bed transfers    Acceptance, E, NR by GABRIELLA at 6/18/2025 1415    Comment: sternal prec, gait safety    Acceptance, E, VU,DU,NR by GABRIELLA at 6/18/2025 0800    Comment: benefits of activity, progression of PT, sternal prec    Acceptance, E, NR by SHAISTA at 6/17/2025 1438    Comment: continued sternal precautions, goal progression    Acceptance, E, VU by SHAISTA at 6/17/2025 1011    Comment: role of PT, sternal precvautions, progressive mobility and ambuakltion                      Point: Home exercise program (In Progress)       Learning Progress Summary            Patient Acceptance, E, NR by SHAISTA at 6/17/2025 1438    Comment: continued sternal precautions, goal progression    Acceptance, E, VU by SHAISTA at 6/17/2025 1011    Comment: role of PT, sternal precvautions, progressive mobility and ambuakltion                      Point: Body mechanics (In Progress)       Learning Progress Summary            Patient Acceptance, E, NR by SHAISTA at 6/17/2025 1438    Comment: continued sternal precautions, goal progression    Acceptance, E, VU by SHAISTA at 6/17/2025 1011    Comment: role of PT, sternal precvautions, progressive mobility and ambuakltion                      Point: Precautions (Done)       Learning Progress Summary             Patient Acceptance, E, VU,DU,NR by GABRIELLA at 6/18/2025 0800    Comment: benefits of activity, progression of PT, sternal prec    Acceptance, E, NR by SHAISTA at 6/17/2025 1438    Comment: continued sternal precautions, goal progression    Acceptance, E, VU by SHAISTA at 6/17/2025 1011    Comment: role of PT, sternal precvautions, progressive mobility and ambuakltion                                      User Key       Initials Effective Dates Name Provider Type Discipline    GABRIELLA 02/03/23 -  Dereck Farias, PT DPT Physical Therapist PT    KULWANT 02/03/23 -  Vale Mack, PTA Physical Therapist Assistant PT    SHAISTA 08/15/24 -  Andrew Palomino, PT DPT Physical Therapist PT                  PT Recommendation and Plan     Plan of Care Reviewed With: patient  Progress: improving  Outcome Evaluation: PT tx completed. Nsg requests PTA to perform orthos. Values are charted in flowsheet note. Positive ortho reading noted. Starting /54 with pt fully reclined in chair, once in standing BP 86/43 with mild dizziness noted. HR maintained 75 in all positions. After rest break pt did amb in room with CGA and standing rest breaks, 75' total. Pt reports no dizziness with ambulation. Nsg notified. will follow.   Outcome Measures       Row Name 06/20/25 1100 06/19/25 1100          How much help from another person do you currently need...    Turning from your back to your side while in flat bed without using bedrails? 3  -KULWANT 4  -KULWANT     Moving from lying on back to sitting on the side of a flat bed without bedrails? 3  -KULWANT 3  -KULWANT     Moving to and from a bed to a chair (including a wheelchair)? 3  -KULWANT 3  -KULWANT     Standing up from a chair using your arms (e.g., wheelchair, bedside chair)? 3  -KULWANT 3  -KULWANT     Climbing 3-5 steps with a railing? 3  -KULWANT 3  -KULWANT     To walk in hospital room? 3  -KULWANT 3  -KULWANT     AM-PAC 6 Clicks Score (PT) 18  -KULWANT 19  -KULWANT               User Key  (r) = Recorded By, (t) = Taken By, (c) = Cosigned By      Initials Name Provider  Type    KULWANT Vale Mack PTA Physical Therapist Assistant                     Time Calculation:    PT Charges       Row Name 06/21/25 1010             Time Calculation    Start Time 0908  -LY      Stop Time 0931  -LY      Time Calculation (min) 23 min  -LY      PT Received On 06/21/25  -LY         Time Calculation- PT    Total Timed Code Minutes- PT 23 minute(s)  -LY         Timed Charges    49196 - Gait Training Minutes  23  -LY         Total Minutes    Timed Charges Total Minutes 23  -LY       Total Minutes 23  -LY                User Key  (r) = Recorded By, (t) = Taken By, (c) = Cosigned By      Initials Name Provider Type    Pippa Boyle PTA Physical Therapist Assistant                  Therapy Charges for Today       Code Description Service Date Service Provider Modifiers Qty    13977310696 HC GAIT TRAINING EA 15 MIN 6/21/2025 Pippa Francis PTA GP 2            PT G-Codes  Outcome Measure Options: AM-PAC 6 Clicks Basic Mobility (PT)  AM-PAC 6 Clicks Score (PT): 19    Pippa Francis PTA  6/21/2025

## 2025-06-21 NOTE — THERAPY TREATMENT NOTE
Acute Care - Physical Therapy Treatment Note  [facility]     Patient Name: Linden Montelongo  : 1945  MRN: [id number]  Today's Date: 2025      Visit Dx:     ICD-10-CM ICD-9-CM   1. Impaired mobility [Z74.09]  Z74.09 799.89   2. Coronary artery disease involving native coronary artery of native heart without angina pectoris  I25.10 414.01   3. Aortic valve stenosis  I35.0 424.1     Patient Active Problem List   Diagnosis    Coronary artery disease involving native coronary artery of native heart without angina pectoris    Aortic valve stenosis    Nonrheumatic aortic (valve) stenosis     Past Medical History:   Diagnosis Date    A-fib     Hyperlipidemia     Hypertension     LUKE on CPAP     Sleep apnea      Past Surgical History:   Procedure Laterality Date    AORTIC VALVE REPAIR/REPLACEMENT N/A 2025    Procedure: AORTIC VALVE REPLACEMENT 23MM, CORONARY ARTERY BYPASS GRAFTING X2, LEFT INTERNAL MAMMARY ARTERY GRAFT, RIGHT LEG ENDOSCOPIC VEIN HARVEST,  MAZE WITH ENCOMPASS CLAMP, CRYOABLATION, LEFT ATRIAL APPENDAGE EXCLUSION 40MM, TRANSESOPHAGEAL ECHOCARDIOGRAM;  Surgeon: Andrew Mcneil MD;  Location:  [facility];  Service: Cardiothoracic;  Laterality: N/A;    APPENDECTOMY      ATRIAL APPENDAGE EXCLUSION LEFT WITH TRANSESOPHAGEAL ECHOCARDIOGRAM N/A 2025    Procedure: ATRIAL APPENDAGE EXCLUSION LEFT WITH TRANSESOPHAGEAL ECHOCARDIOGRAM;  Surgeon: Andrew Mcneil MD;  Location:  [facility];  Service: Cardiothoracic;  Laterality: N/A;    CORONARY ARTERY BYPASS GRAFT N/A 2025    Procedure: CORONARY ARTERY BYPASS GRAFTING;  Surgeon: Andrew Mcneil MD;  Location:  [facility];  Service: Cardiothoracic;  Laterality: N/A;    ESOPHAGUS SURGERY      MAZE PROCEDURE N/A 2025    Procedure: MAZE PROCEDURE WITH ENCOMPASS CLAMP;  Surgeon: Andrew Mcneil MD;  Location:  [facility];  Service: Cardiothoracic;  Laterality: N/A;    REPLACEMENT TOTAL KNEE Right     TOTAL HIP ARTHROPLASTY Right      PT Assessment (Last 12  Hours)       PT Evaluation and Treatment       Row Name 06/21/25 1310 06/21/25 0908       Physical Therapy Time and Intention    Subjective Information complains of;dizziness;pain  -LY complains of;dizziness  -LY    Document Type therapy note (daily note)  -LY therapy note (daily note)  -LY    Mode of Treatment physical therapy  -LY physical therapy  -LY    Comment -- Completed orthos prior to ambulation, decrease in BP with standing. Nsg aware. Values charted in flowsheet.  -LY      Row Name 06/21/25 1310 06/21/25 0908       General Information    Existing Precautions/Restrictions fall;sternal  chest tube  -LY fall;sternal  chest tube  -LY      Row Name 06/21/25 1310 06/21/25 0908       Pain    Pretreatment Pain Rating 4/10  -LY 3/10  -LY    Posttreatment Pain Rating 4/10  -LY 3/10  -LY    Pain Location back  -LY chest  -LY    Pain Management Interventions exercise or physical activity utilized  -LY exercise or physical activity utilized  -LY    Response to Pain Interventions activity participation with tolerable pain  -LY activity participation with tolerable pain  -LY      Row Name 06/21/25 1310 06/21/25 0908       Bed Mobility    Comment, (Bed Mobility) chair  -LY chair  -LY      Row Name 06/21/25 1310 06/21/25 0908       Sit-Stand Transfer    Sit-Stand [place] (Transfers) verbal cues;contact guard  -LY verbal cues;contact guard  -LY      Row Name 06/21/25 1310 06/21/25 0908       Stand-Sit Transfer    Stand-Sit [place] (Transfers) verbal cues;contact guard  -LY verbal cues;contact guard  -LY      Row Name 06/21/25 1310 06/21/25 0908       Gait/Stairs (Locomotion)    [place] Level (Gait) verbal cues;contact guard  -LY minimum assist (75% patient effort)  -LY    Distance in Feet (Gait) 120  with 1 standing rest break  -LY 75  with 3 standing rest breaks, stayed in room per nsg  -LY    Deviations/Abnormal Patterns (Gait) gait speed decreased  -LY gait speed decreased  -LY    Bilateral Gait  Deviations forward flexed posture;heel strike decreased  -LY forward flexed posture  -LY    Comment, (Gait/Stairs) chair follow, c/o mild dizziness, subsides with rest  -LY no reports of dizziness with ambulation, only static standing  -LY      Row Name 06/21/25 1310 06/21/25 0908       Motor Skills    Comments, Therapeutic Exercise cardiac protocol  -LY cardiac protocol  -LY      Row Name             Wound 06/16/25 0817 sternal Surgical    Wound - Properties Group Placement Date: 06/16/25  - Placement Time: 0817  -LH Present on Original Admission: N  -LH Location: sternal  -LH Primary Wound Type: Surgical  -LH Additional Comments: Prevena  -LH    Retired Wound - Properties Group Placement Date: 06/16/25  - Placement Time: 0817  -LH Present on Original Admission: N  -LH Location: sternal  -LH Additional Comments: Prevena  -LH    Retired Wound - Properties Group Placement Date: 06/16/25  - Placement Time: 0817  -LH Present on Original Admission: N  -LH Location: sternal  -LH Additional Comments: Prevena  -LH    Retired Wound - Properties Group Date first assessed: 06/16/25  - Time first assessed: 0817  -LH Present on Original Admission: N  -LH Location: sternal  -LH Additional Comments: Prevena  -      Row Name             Wound 06/16/25 0817 Right anterior knee Surgical    Wound - Properties Group Placement Date: 06/16/25  - Placement Time: 0817  -LH Present on Original Admission: N  -LH Side: Right  -LH Orientation: anterior  -LH Location: knee  -LH Primary Wound Type: Surgical  -LH Additional Comments: mastisol, steri strips, mepilex, ace  -LH    Retired Wound - Properties Group Placement Date: 06/16/25  - Placement Time: 0817  -LH Present on Original Admission: N  -LH Side: Right  -LH Orientation: anterior  -LH Location: knee  -LH Additional Comments: mastisol, steri strips, mepilex, ace  -LH    Retired Wound - Properties Group Placement Date: 06/16/25  - Placement Time: 0817  -LH Present on  Original Admission: N  -LH Side: Right  -LH Orientation: anterior  -LH Location: knee  -LH Additional Comments: mastisol, steri strips, mepilex, ace  -LH    Retired Wound - Properties Group Date first assessed: 06/16/25  - Time first assessed: 0817 -LH Present on Original Admission: N  -LH Side: Right  -LH Location: knee  -LH Additional Comments: mastisol, steri strips, mepilex, ace  -LH      Row Name             Wound 06/16/25 0817 Right anterior groin Surgical    Wound - Properties Group Placement Date: 06/16/25 - Placement Time: 0817 -LH Side: Right  -LH Orientation: anterior  -LH Location: groin  -LH Primary Wound Type: Surgical  -LH Additional Comments: mastisol, steri strips, mepilex, ace  -LH    Retired Wound - Properties Group Placement Date: 06/16/25 -LH Placement Time: 0817 -LH Side: Right  -LH Orientation: anterior  -LH Location: groin  -LH Additional Comments: mastisol, steri strips, mepilex, ace  -LH    Retired Wound - Properties Group Placement Date: 06/16/25 - Placement Time: 0817 -LH Side: Right  -LH Orientation: anterior  -LH Location: groin  -LH Additional Comments: mastisol, steri strips, mepilex, ace  -LH    Retired Wound - Properties Group Date first assessed: 06/16/25 - Time first assessed: 0817 -LH Side: Right  -LH Location: groin  -LH Additional Comments: mastisol, steri strips, mepilex, ace  -LH      Row Name 06/21/25 1310 06/21/25 0908       Plan of Care Review    Plan of Care Reviewed With patient  -LY patient  -LY    Progress improving  -LY improving  -LY    Outcome Evaluation -- PT tx completed. Nsg requests PTA to perform orthos. Values are charted in flowsheet note. Positive ortho reading noted. Starting /54 with pt fully reclined in chair, once in standing BP 86/43 with mild dizziness noted. HR maintained 75 in all positions. After rest break pt did amb in room with CGA and standing rest breaks, 75' total. Pt reports no dizziness with ambulation. Nsg notified.  will follow.  -LY      Row Name 06/21/25 0908          Vital Signs    Pre Systolic BP Rehab 110  -LY     Pre Treatment Diastolic BP 54  -LY     Intra Systolic BP Rehab 102  -LY     Intra Treatment Diastolic BP 59  -LY     Post Systolic BP Rehab 86  -LY     Post Treatment Diastolic BP 43  -LY     Pretreatment Heart Rate (beats/min) 75  -LY     Intratreatment Heart Rate (beats/min) 75  -LY     Posttreatment Heart Rate (beats/min) 75  -LY     Pre Patient Position Supine  fully reclined in chair  -LY     Intra Patient Position Sitting  -LY     Post Patient Position Standing  -LY     Vitals Comment mild dizziness with static standing  -LY       Row Name 06/21/25 1310 06/21/25 0908       Positioning and Restraints    Pre-Treatment Position sitting in chair/recliner  -LY sitting in chair/recliner  -LY    Post Treatment Position chair  -LY chair  -LY    In Chair reclined;call light within reach;encouraged to call for assist  -LY reclined;call light within reach;encouraged to call for assist  -LY              User Key  (r) = Recorded By, (t) = Taken By, (c) = Cosigned By      Initials Name Provider Type    Dania Ge, RN Registered Nurse    Pippa Boyle, PTA Physical Therapist Assistant                    Physical Therapy Education       Title: PT OT SLP Therapies (In Progress)       Topic: Physical Therapy (In Progress)       Point: Mobility training (In Progress)       Learning Progress Summary            Patient Acceptance, E,D, NR by KULWANT at 6/20/2025 1129    Comment: bed transfers    Acceptance, E, NR by GABRIELLA at 6/18/2025 1415    Comment: sternal prec, gait safety    Acceptance, E, VU,DU,NR by GABRIELLA at 6/18/2025 0800    Comment: benefits of activity, progression of PT, sternal prec    Acceptance, E, NR by SHAISTA at 6/17/2025 1438    Comment: continued sternal precautions, goal progression    Acceptance, E, VU by SHAISTA at 6/17/2025 1011    Comment: role of PT, sternal precvautions, progressive mobility and ambuakltion                       Point: Home exercise program (In Progress)       Learning Progress Summary            Patient Acceptance, E, NR by SHAISTA at 6/17/2025 1438    Comment: continued sternal precautions, goal progression    Acceptance, E, VU by SHAISTA at 6/17/2025 1011    Comment: role of PT, sternal precvautions, progressive mobility and ambuakltion                      Point: Body mechanics (In Progress)       Learning Progress Summary            Patient Acceptance, E, NR by SHAISTA at 6/17/2025 1438    Comment: continued sternal precautions, goal progression    Acceptance, E, VU by SHAISTA at 6/17/2025 1011    Comment: role of PT, sternal precvautions, progressive mobility and ambuakltion                      Point: Precautions (Done)       Learning Progress Summary            Patient Acceptance, E, VU,DU,NR by GABRIELLA at 6/18/2025 0800    Comment: benefits of activity, progression of PT, sternal prec    Acceptance, E, NR by SHAISTA at 6/17/2025 1438    Comment: continued sternal precautions, goal progression    Acceptance, E, VU by SHAISTA at 6/17/2025 1011    Comment: role of PT, sternal precvautions, progressive mobility and ambuakltion                                      User Key       Initials Effective Dates Name Provider Type Discipline    GABRIELLA 02/03/23 -  Dereck Farias PT DPT Physical Therapist PT    KULWANT 02/03/23 -  Vale Mack PTA Physical Therapist Assistant PT    SHAISTA 08/15/24 -  Andrew Palomino, TANGELA DPT Physical Therapist PT                  PT Recommendation and Plan  Anticipated Discharge Disposition (PT): home with assist  Plan of Care Reviewed With: patient  Progress: improving  Outcome Evaluation: PT tx completed. Nsg requests PTA to perform orthos. Values are charted in flowsheet note. Positive ortho reading noted. Starting /54 with pt fully reclined in chair, once in standing BP 86/43 with mild dizziness noted. HR maintained 75 in all positions. After rest break pt did amb in room with CGA and standing rest breaks, 75'  total. Pt reports no dizziness with ambulation. Nsg notified. will follow.   Outcome Measures       Row Name 06/20/25 1100 06/19/25 1100          How much help from another person do you currently need...    Turning from your back to your side while in flat bed without using bedrails? 3  -KULWANT 4  -KULWANT     Moving from lying on back to sitting on the side of a flat bed without bedrails? 3  -KULWANT 3  -KULWANT     Moving to and from a bed to a chair (including a wheelchair)? 3  -KULWANT 3  -KULWANT     Standing up from a chair using your arms (e.g., wheelchair, bedside chair)? 3  -KULWANT 3  -KULWANT     Climbing 3-5 steps with a railing? 3  -KULWANT 3  -KULWANT     To walk in hospital room? 3  -KULWANT 3  -KULWANT     AM-PAC 6 Clicks Score (PT) 18  -KULWANT 19  -KULWANT               User Key  (r) = Recorded By, (t) = Taken By, (c) = Cosigned By      Initials Name Provider Type    KULWANT Vale Mack PTA Physical Therapist Assistant                     Time Calculation:    PT Charges       Row Name 06/21/25 1429 06/21/25 1010          Time Calculation    Start Time 1310  -LY 0908  -LY     Stop Time 1327  -LY 0931  -LY     Time Calculation (min) 17 min  -LY 23 min  -LY     PT Received On 06/21/25  -LY 06/21/25  -LY        Time Calculation- PT    Total Timed Code Minutes- PT 17 minute(s)  -LY 23 minute(s)  -LY        Timed Charges    73407 - Gait Training Minutes  17  -LY 23  -LY        Total Minutes    Timed Charges Total Minutes 17  -LY 23  -LY      Total Minutes 17  -LY 23  -LY               User Key  (r) = Recorded By, (t) = Taken By, (c) = Cosigned By      Initials Name Provider Type    Pippa Boyle PTA Physical Therapist Assistant                  Therapy Charges for Today       Code Description Service Date Service Provider Modifiers Qty    29124789765 HC GAIT TRAINING EA 15 MIN 6/21/2025 Pippa Francis PTA GP 2    80672250256 HC GAIT TRAINING EA 15 MIN 6/21/2025 Pippa Francis PTA GP 1            PT G-Codes  Outcome Measure Options: AM-PAC 6 Clicks Basic Mobility  (PT)  AM-PAC 6 Clicks Score (PT): 19    Pippa Francis, PTA  6/21/2025

## 2025-06-21 NOTE — PROGRESS NOTES
"Patient name: Linden Montelongo  Patient : 1945  VISIT # [id number]  MR #[id number]    Procedure:Procedure(s):  AORTIC VALVE REPLACEMENT 23MM, CORONARY ARTERY BYPASS GRAFTING X2, LEFT INTERNAL MAMMARY ARTERY GRAFT, RIGHT LEG ENDOSCOPIC VEIN HARVEST,  MAZE WITH ENCOMPASS CLAMP, CRYOABLATION, LEFT ATRIAL APPENDAGE EXCLUSION 40MM, TRANSESOPHAGEAL ECHOCARDIOGRAM  CORONARY ARTERY BYPASS GRAFTING  MAZE PROCEDURE WITH ENCOMPASS CLAMP  ATRIAL APPENDAGE EXCLUSION LEFT WITH TRANSESOPHAGEAL ECHOCARDIOGRAM  Procedure Date:2025  POD:5 Days Post-Op    Subjective     Resting in bed.  Remains on 2 L per nasal cannula.  Obtaining 1250 with incentive spirometer.  Weight up 10 pounds and is now 18 pounds above baseline.  Patient has had no further syncopal episodes although did experience dizziness with standing and continues to have positive orthostatic blood pressure.  Converted into sinus rhythm last night at 1800.      Telemetry: Sinus rhythm in the 70s  IV drips: None         Objective     Visit Vitals  /67 (BP Location: Left arm, Patient Position: Sitting)   Pulse 69   Temp 97.8 °F (36.6 °C) (Oral)   Resp 18   Ht 177 cm (69.69\")   Wt 111 kg (244 lb 3.2 oz)   SpO2 94%   BMI 35.36 kg/m²   Baseline weight 238 pounds    Intake/Output Summary (Last 24 hours) at 2025 1224  Last data filed at 2025 1150  Gross per 24 hour   Intake --   Output 1210 ml   Net -1210 ml   Left pleural drain: 240 mL in 24 hours, serosanguineous, no airleak    Lab:     CBC:  Results from last 7 days   Lab Units 25  0356 25   WBC 10*3/mm3 6.33 5.85 6.78   HEMATOCRIT % 26.9* 25.9* 28.0*   PLATELETS 10*3/mm3 170 159 140          BMP:  Results from last 7 days   Lab Units 25  0356 25  2359 25  0433   SODIUM mmol/L 135* 136 134*   POTASSIUM mmol/L 3.7 3.7 3.7   CHLORIDE mmol/L 100 101 101   CO2 mmol/L 23.0 22.0 21.0*   GLUCOSE mg/dL 111* 120* 122*   BUN mg/dL 14.7 15.6 12.5   CREATININE " mg/dL 0.73* 0.78 0.67*          COAG:  Results from last 7 days   Lab Units 06/20/25  2225   INR  0.96   APTT seconds 38.4*       IMAGES:       Imaging Results (Last 24 Hours)       Procedure Component Value Units Date/Time    XR Chest 1 View [[id number]] Collected: 06/21/25 0829     Updated: 06/21/25 0833    Narrative:      EXAM: XR CHEST 1 VW-         HISTORY: Post-Op Heart Surgery       COMPARISON: 6/20/2025.     TECHNIQUE:  Frontal view(s) of the chest submitted.     FINDINGS:    Left chest tube remains in place. Small left pleural effusion has  improved. There is persistent atelectasis but also improved. There is a  tiny left apical pneumothorax which has decreased in size. Patient is  status post median sternotomy and CABG with aortic valve replacement and  left atrial appendage exclusion.          Impression:         1. Postoperative chest with stable left chest tube and improved left  apical pneumothorax.  2. Improved small bilateral pleural effusions and improving atelectasis.     This report was signed and finalized on 6/21/2025 8:30 AM by Ortiz Islas.       XR Chest 1 View [[id number]] Collected: 06/20/25 1620     Updated: 06/20/25 1624    Narrative:      EXAMINATION: XR CHEST 1 VW-     HISTORY: apical pneumo; Z74.09-Other reduced mobility;  I25.10-Atherosclerotic heart disease of native coronary artery without  angina pectoris; I35.0-Nonrheumatic aortic (valve) stenosis     Images are stored in PACS per institutional protocol.     1 view chest x-ray.     COMPARISON:  Earlier today at 3:56 a.m.     Heart size is magnified and unchanged. Median sternotomy changes.  LEFT basilar chest tube present.     LEFT apical pneumothorax again seen.  The apical pleural reflection lies 22 mm from the bony apex compared  with 19 mm on the comparison study.     The RIGHT lung is adequately expanded and essentially clear.     There is patchy atelectasis at the LEFT lung base.       Impression:      1. Tiny LEFT apical  pneumothorax is very similar as compared with 12  hours ago.           This report was signed and finalized on 6/20/2025 4:21 PM by Dr. Carlos Rosario MD.             CXR: Chest tube in place.  Stable known small left apical pneumothorax.  Stable small bilateral pleural effusions.        Physical Exam: No change in physical exam  General: Alert, oriented. No apparent distress.   Cardiovascular: Regular rate and rhythm without murmur, rubs, or gallops.    Pulmonary: Clear to auscultation bilaterally without wheezing, rubs, or rales.  Chest: Sternotomy incision clean, dry, and intact. Sternum stable. No clicks.  Left chest tube to 20 cm suction. No air leak. Fluid is serosanguineous  Abdomen: Soft, nondistended, and nontender.  Extremities: Warm, moves all extremities. No edema. Saphenectomy site clean, dry, and intact  Neurologic:  Grossly intact with no focal deficits.          Impression:  Aortic valve stenosis  Coronary artery disease  Paroxysmal atrial fibrillation  Hyperlipidemia  Hypertension    Plan:  Continue to hold diuresis.  Routine postcardiac surgery care  Encourage pulmonary toilet ambulation  Keep chest tube as output is too high for removal  Give 1 L of normal saline      Discussed with patient and nursing      Jojo Álvarez, JUDE  06/21/25  12:24 CDT

## 2025-06-21 NOTE — PLAN OF CARE
Goal Outcome Evaluation:  Plan of Care Reviewed With: patient        Progress: improving  Outcome Evaluation: Converted to AF 1157 and was rate controlled then converted back to sinus per tele at 1428 and pt was asymptomatic. Fluids given and pt ambulated outside the room with mild fatigue. Safety maintained

## 2025-06-22 ENCOUNTER — APPOINTMENT (OUTPATIENT)
Dept: GENERAL RADIOLOGY | Facility: HOSPITAL | Age: 80
End: 2025-06-22
Payer: MEDICARE

## 2025-06-22 LAB
ANION GAP SERPL CALCULATED.3IONS-SCNC: 11 MMOL/L (ref 5–15)
BUN SERPL-MCNC: 13.6 MG/DL (ref 8–23)
BUN/CREAT SERPL: 16.2 (ref 7–25)
CALCIUM SPEC-SCNC: 8 MG/DL (ref 8.6–10.5)
CHLORIDE SERPL-SCNC: 101 MMOL/L (ref 98–107)
CO2 SERPL-SCNC: 21 MMOL/L (ref 22–29)
CREAT SERPL-MCNC: 0.84 MG/DL (ref 0.76–1.27)
DEPRECATED RDW RBC AUTO: 60.5 FL (ref 37–54)
EGFRCR SERPLBLD CKD-EPI 2021: 88.2 ML/MIN/1.73
ERYTHROCYTE [DISTWIDTH] IN BLOOD BY AUTOMATED COUNT: 16.7 % (ref 12.3–15.4)
GLUCOSE BLDC GLUCOMTR-MCNC: 110 MG/DL (ref 70–130)
GLUCOSE BLDC GLUCOMTR-MCNC: 119 MG/DL (ref 70–130)
GLUCOSE BLDC GLUCOMTR-MCNC: 120 MG/DL (ref 70–130)
GLUCOSE BLDC GLUCOMTR-MCNC: 124 MG/DL (ref 70–130)
GLUCOSE SERPL-MCNC: 107 MG/DL (ref 65–99)
HCT VFR BLD AUTO: 30.2 % (ref 37.5–51)
HGB BLD-MCNC: 9.2 G/DL (ref 13–17.7)
MAGNESIUM SERPL-MCNC: 2.1 MG/DL (ref 1.6–2.4)
MCH RBC QN AUTO: 30.1 PG (ref 26.6–33)
MCHC RBC AUTO-ENTMCNC: 30.5 G/DL (ref 31.5–35.7)
MCV RBC AUTO: 98.7 FL (ref 79–97)
PLATELET # BLD AUTO: 185 10*3/MM3 (ref 140–450)
PMV BLD AUTO: 9.6 FL (ref 6–12)
POTASSIUM SERPL-SCNC: 3.8 MMOL/L (ref 3.5–5.2)
RBC # BLD AUTO: 3.06 10*6/MM3 (ref 4.14–5.8)
SODIUM SERPL-SCNC: 133 MMOL/L (ref 136–145)
WBC NRBC COR # BLD AUTO: 7.78 10*3/MM3 (ref 3.4–10.8)

## 2025-06-22 PROCEDURE — 85027 COMPLETE CBC AUTOMATED: CPT | Performed by: NURSE PRACTITIONER

## 2025-06-22 PROCEDURE — 25010000002 ENOXAPARIN PER 10 MG: Performed by: NURSE PRACTITIONER

## 2025-06-22 PROCEDURE — 83735 ASSAY OF MAGNESIUM: CPT | Performed by: NURSE PRACTITIONER

## 2025-06-22 PROCEDURE — 71045 X-RAY EXAM CHEST 1 VIEW: CPT

## 2025-06-22 PROCEDURE — 97116 GAIT TRAINING THERAPY: CPT

## 2025-06-22 PROCEDURE — 82948 REAGENT STRIP/BLOOD GLUCOSE: CPT

## 2025-06-22 PROCEDURE — 80048 BASIC METABOLIC PNL TOTAL CA: CPT | Performed by: NURSE PRACTITIONER

## 2025-06-22 RX ADMIN — METOPROLOL TARTRATE 25 MG: 25 TABLET, FILM COATED ORAL at 20:08

## 2025-06-22 RX ADMIN — FAMOTIDINE 20 MG: 20 TABLET, FILM COATED ORAL at 08:59

## 2025-06-22 RX ADMIN — ENOXAPARIN SODIUM 40 MG: 100 INJECTION SUBCUTANEOUS at 09:00

## 2025-06-22 RX ADMIN — CLOPIDOGREL BISULFATE 75 MG: 75 TABLET ORAL at 17:02

## 2025-06-22 RX ADMIN — ACETAMINOPHEN 650 MG: 325 TABLET ORAL at 17:02

## 2025-06-22 RX ADMIN — ASPIRIN 81 MG: 81 TABLET, COATED ORAL at 08:59

## 2025-06-22 RX ADMIN — ACETAMINOPHEN 650 MG: 325 TABLET ORAL at 05:12

## 2025-06-22 RX ADMIN — ATORVASTATIN CALCIUM 20 MG: 10 TABLET, FILM COATED ORAL at 20:07

## 2025-06-22 RX ADMIN — ACETAMINOPHEN 650 MG: 325 TABLET ORAL at 12:06

## 2025-06-22 RX ADMIN — AMIODARONE HYDROCHLORIDE 200 MG: 200 TABLET ORAL at 17:02

## 2025-06-22 RX ADMIN — FAMOTIDINE 20 MG: 20 TABLET, FILM COATED ORAL at 17:02

## 2025-06-22 RX ADMIN — AMIODARONE HYDROCHLORIDE 200 MG: 200 TABLET ORAL at 08:59

## 2025-06-22 RX ADMIN — POLYETHYLENE GLYCOL 3350 17 G: 17 POWDER, FOR SOLUTION ORAL at 09:00

## 2025-06-22 RX ADMIN — BISACODYL 10 MG: 5 TABLET, COATED ORAL at 08:59

## 2025-06-22 RX ADMIN — METOPROLOL TARTRATE 25 MG: 25 TABLET, FILM COATED ORAL at 08:59

## 2025-06-22 NOTE — PLAN OF CARE
Goal Outcome Evaluation:  Plan of Care Reviewed With: patient        Progress: improving  Outcome Evaluation: SB/S 59-76 per tele. C/o pain on left flank x1 relieved with setting forward in the chair. Pt has ambulated with mild dizziness reported and recovered quickly. Chest tube continues to have output. Sternal precautions maintained.

## 2025-06-22 NOTE — PROGRESS NOTES
"Patient name: Linden Montelongo  Patient : 1945  VISIT # [id number]  MR #[id number]    Procedure:Procedure(s):  AORTIC VALVE REPLACEMENT 23MM, CORONARY ARTERY BYPASS GRAFTING X2, LEFT INTERNAL MAMMARY ARTERY GRAFT, RIGHT LEG ENDOSCOPIC VEIN HARVEST,  MAZE WITH ENCOMPASS CLAMP, CRYOABLATION, LEFT ATRIAL APPENDAGE EXCLUSION 40MM, TRANSESOPHAGEAL ECHOCARDIOGRAM  CORONARY ARTERY BYPASS GRAFTING  MAZE PROCEDURE WITH ENCOMPASS CLAMP  ATRIAL APPENDAGE EXCLUSION LEFT WITH TRANSESOPHAGEAL ECHOCARDIOGRAM  Procedure Date:2025  POD:6 Days Post-Op    Subjective     Up in resting in recliner.  No dizziness when transferring from bed to recliner, has yet to ambulate. Remains on room air with SpO2 of 96%.  No change in weight and remains 6 pounds above baseline.  Creatinine 0.84 with a potassium of 3.8 and sodium 133.  Currently in sinus rhythm, converted to atrial fibs yesterday and converted back to sinus.  Chest tube output is serosanguineous and thin and continues to have significant output.  Reports pain is overall controlled.      Telemetry: Sinus rhythm in the 70s  IV drips: None         Objective     Visit Vitals  /67 (BP Location: Right arm, Patient Position: Sitting)   Pulse 70   Temp 98.2 °F (36.8 °C) (Oral)   Resp 16   Ht 177 cm (69.69\")   Wt 111 kg (244 lb 2.6 oz)   SpO2 93%   BMI 35.35 kg/m²   Baseline weight 238 pounds    Intake/Output Summary (Last 24 hours) at 2025 1118  Last data filed at 2025 1028  Gross per 24 hour   Intake --   Output 1785 ml   Net -1785 ml   Left pleural drain: 400 mL in 24 hours, serosanguineous, no airleak    Lab:     CBC:  Results from last 7 days   Lab Units 25  0406 25  0356 25  2225   WBC 10*3/mm3 7.78 6.33 5.85   HEMATOCRIT % 30.2* 26.9* 25.9*   PLATELETS 10*3/mm3 185 170 159          BMP:  Results from last 7 days   Lab Units 25  0406 25  0356 25  2359   SODIUM mmol/L 133* 135* 136   POTASSIUM mmol/L 3.8 3.7 3.7   CHLORIDE " mmol/L 101 100 101   CO2 mmol/L 21.0* 23.0 22.0   GLUCOSE mg/dL 107* 111* 120*   BUN mg/dL 13.6 14.7 15.6   CREATININE mg/dL 0.84 0.73* 0.78          COAG:  Results from last 7 days   Lab Units 06/20/25 2225   INR  0.96   APTT seconds 38.4*       IMAGES:       Imaging Results (Last 24 Hours)       Procedure Component Value Units Date/Time    XR Chest 1 View [[id number]] Collected: 06/22/25 0745     Updated: 06/22/25 0749    Narrative:      EXAM: XR CHEST 1 VW-         HISTORY: Post-Op Heart Surgery       COMPARISON: 6/21/2025.     TECHNIQUE:  Frontal and lateral views of the chest submitted.     FINDINGS:    Left chest tube has been pulled back. Small left apical pneumothorax is  stable to slightly increased in size. No large effusion is seen. Patient  is status post median sternotomy with CABG and aortic valve replacement  and left atrial appendage exclusion.          Impression:      1. Left chest tube pulled back with continued small left apical  pneumothorax which may be stable to slightly increased in size.     This report was signed and finalized on 6/22/2025 7:46 AM by Ortiz Islas.             CXR: Persistent small left apical pneumothorax.  Persistent bilateral pleural effusions.      Physical Exam:   General: Alert, oriented. No apparent distress.   Cardiovascular: Regular rate and rhythm without murmur, rubs, or gallops.    Pulmonary: Clear to auscultation bilaterally without wheezing, rubs, or rales.  Chest: Sternotomy incision clean, dry, and intact. Sternum stable. No clicks.  Left chest tube to 20 cm suction. No air leak. Fluid is serosanguineous and thin.  Abdomen: Soft, nondistended, and nontender.  Extremities: Warm, moves all extremities.  2+ bilateral lower edema. Saphenectomy site clean, dry, and intact  Neurologic:  Grossly intact with no focal deficits.          Impression:  Aortic valve stenosis  Coronary artery disease  Paroxysmal atrial  fibrillation  Hyperlipidemia  Hypertension    Plan:  Routine postcardiac surgery care  Encourage pulmonary toilet ambulation  Keep chest tube as output is too high for removal    If the patient does not experience dizziness with ambulation will need to resume Lasix 20 mg IV twice daily and potassium 20 mEq p.o. twice daily, discussed with SHER Ha.      Discussed with patient and nursing      Jojo Álvarez, JUDE  06/22/25  11:18 CDT

## 2025-06-22 NOTE — PLAN OF CARE
Goal Outcome Evaluation:  Plan of Care Reviewed With: patient        Progress: improving  Outcome Evaluation: PT tx completed. Pt up in chair, cont with chest tube with minimal pain. Nsg requests to follow pt with chair during ambulation to maximize safety. Pt amb 50', 75' then 125 ' with CGA and seated rest breaks. Pt reports some dizziness, however, recovered quickly with seated rest break. Frequent cues provided for breathing technique.Will cont to follow as pt tolerates.    Anticipated Discharge Disposition (PT): home with assist

## 2025-06-22 NOTE — PLAN OF CARE
Problem: Adult Inpatient Plan of Care  Goal: Plan of Care Review  Outcome: Progressing  Goal: Patient-Specific Goal (Individualized)  Outcome: Progressing  Goal: Absence of Hospital-Acquired Illness or Injury  Outcome: Progressing  Intervention: Identify and Manage Fall Risk  Recent Flowsheet Documentation  Taken 6/22/2025 0407 by Alvarez Freedman RN  Safety Promotion/Fall Prevention: safety round/check completed  Taken 6/22/2025 0200 by Alvarez Freedman RN  Safety Promotion/Fall Prevention: safety round/check completed  Taken 6/22/2025 0000 by Alvarez Freedman RN  Safety Promotion/Fall Prevention: safety round/check completed  Taken 6/21/2025 2200 by Alvarez Freedman RN  Safety Promotion/Fall Prevention: safety round/check completed  Taken 6/21/2025 2100 by Alvarez Freedman RN  Safety Promotion/Fall Prevention: safety round/check completed  Taken 6/21/2025 1928 by Alvarez Freedman RN  Safety Promotion/Fall Prevention: safety round/check completed  Intervention: Prevent Skin Injury  Recent Flowsheet Documentation  Taken 6/21/2025 1928 by Alvarez Freedman RN  Body Position: position changed independently  Intervention: Prevent and Manage VTE (Venous Thromboembolism) Risk  Recent Flowsheet Documentation  Taken 6/21/2025 1928 by Alvarez Freedman RN  VTE Prevention/Management: (see mar) other (see comments)  Goal: Optimal Comfort and Wellbeing  Outcome: Progressing  Intervention: Monitor Pain and Promote Comfort  Recent Flowsheet Documentation  Taken 6/21/2025 1928 by Alvarez Freedman RN  Pain Management Interventions: pain medication given  Goal: Readiness for Transition of Care  Outcome: Progressing     Problem: Skin Injury Risk Increased  Goal: Skin Health and Integrity  Outcome: Progressing     Problem: Fall Injury Risk  Goal: Absence of Fall and Fall-Related Injury  Outcome: Progressing  Intervention: Promote Injury-Free Environment  Recent Flowsheet Documentation  Taken 6/22/2025 0407 by Alvarez Freedman RN  Safety Promotion/Fall  Prevention: safety round/check completed  Taken 6/22/2025 0200 by Alvarez Freedman RN  Safety Promotion/Fall Prevention: safety round/check completed  Taken 6/22/2025 0000 by Alvarez Freedman RN  Safety Promotion/Fall Prevention: safety round/check completed  Taken 6/21/2025 2200 by Alvarez Freedman RN  Safety Promotion/Fall Prevention: safety round/check completed  Taken 6/21/2025 2100 by Alvarez Freedman RN  Safety Promotion/Fall Prevention: safety round/check completed  Taken 6/21/2025 1928 by Alvarez Freedman RN  Safety Promotion/Fall Prevention: safety round/check completed   Goal Outcome Evaluation:         S 69-78 per tele.vss. c/o pain near chest tube site, given pain meds per mar. Good output from chest tube. Safety maintained.

## 2025-06-22 NOTE — CASE MANAGEMENT/SOCIAL WORK
Continued Stay Note   [place]     Patient Name: Linden Montelongo  MRN: [id number]  Today's Date: 6/22/2025    Admit Date: 6/16/2025    Plan: Home   Discharge Plan       Row Name 06/22/25 1356       Plan    Plan Home    Plan Comments Chart reviewed; events noted.  Pt is ambulating over 75 ft.  Pt plans home; no dc needs identified.                   Discharge Codes    No documentation.                 Expected Discharge Date and Time       Expected Discharge Date Expected Discharge Time    Jun 20, 2025               GUEVARA BarkerW

## 2025-06-22 NOTE — THERAPY TREATMENT NOTE
Acute Care - Physical Therapy Treatment Note  [facility]     Patient Name: Linden Montelongo  : 1945  MRN: [id number]  Today's Date: 2025      Visit Dx:     ICD-10-CM ICD-9-CM   1. Impaired mobility [Z74.09]  Z74.09 799.89   2. Coronary artery disease involving native coronary artery of native heart without angina pectoris  I25.10 414.01   3. Aortic valve stenosis  I35.0 424.1     Patient Active Problem List   Diagnosis    Coronary artery disease involving native coronary artery of native heart without angina pectoris    Aortic valve stenosis    Nonrheumatic aortic (valve) stenosis     Past Medical History:   Diagnosis Date    A-fib     Hyperlipidemia     Hypertension     LUKE on CPAP     Sleep apnea      Past Surgical History:   Procedure Laterality Date    AORTIC VALVE REPAIR/REPLACEMENT N/A 2025    Procedure: AORTIC VALVE REPLACEMENT 23MM, CORONARY ARTERY BYPASS GRAFTING X2, LEFT INTERNAL MAMMARY ARTERY GRAFT, RIGHT LEG ENDOSCOPIC VEIN HARVEST,  MAZE WITH ENCOMPASS CLAMP, CRYOABLATION, LEFT ATRIAL APPENDAGE EXCLUSION 40MM, TRANSESOPHAGEAL ECHOCARDIOGRAM;  Surgeon: Andrew Mcneil MD;  Location:  [facility];  Service: Cardiothoracic;  Laterality: N/A;    APPENDECTOMY      ATRIAL APPENDAGE EXCLUSION LEFT WITH TRANSESOPHAGEAL ECHOCARDIOGRAM N/A 2025    Procedure: ATRIAL APPENDAGE EXCLUSION LEFT WITH TRANSESOPHAGEAL ECHOCARDIOGRAM;  Surgeon: Andrew Mcneil MD;  Location:  [facility];  Service: Cardiothoracic;  Laterality: N/A;    CORONARY ARTERY BYPASS GRAFT N/A 2025    Procedure: CORONARY ARTERY BYPASS GRAFTING;  Surgeon: Andrew Mcneil MD;  Location:  [facility];  Service: Cardiothoracic;  Laterality: N/A;    ESOPHAGUS SURGERY      MAZE PROCEDURE N/A 2025    Procedure: MAZE PROCEDURE WITH ENCOMPASS CLAMP;  Surgeon: Andrew Mcneil MD;  Location:  [facility];  Service: Cardiothoracic;  Laterality: N/A;    REPLACEMENT TOTAL KNEE Right     TOTAL HIP ARTHROPLASTY Right      PT Assessment (Last 12  Hours)       PT Evaluation and Treatment       Row Name 06/22/25 1436 06/22/25 0955       Physical Therapy Time and Intention    Subjective Information complains of;weakness;pain  -LY complains of;weakness;dizziness;dyspnea  -LY    Document Type therapy note (daily note)  -LY therapy note (daily note)  -LY    Mode of Treatment physical therapy  -LY physical therapy  -LY    Comment reviewed sternal precautions  -LY --      Row Name 06/22/25 1436 06/22/25 0955       General Information    Existing Precautions/Restrictions fall;sternal  chest tube  -LY fall;sternal  chest tube  -LY      Row Name 06/22/25 1436 06/22/25 0955       Pain    Pretreatment Pain Rating 1/10  -LY 4/10  -LY    Posttreatment Pain Rating 1/10  -LY 4/10  -LY    Pain Location flank  -LY back  -LY    Pain Side/Orientation left  -LY left  -LY    Pain Management Interventions exercise or physical activity utilized  -LY exercise or physical activity utilized  -LY    Response to Pain Interventions activity participation with tolerable pain  -LY activity participation with tolerable pain  -LY      Row Name 06/22/25 1436 06/22/25 0955       Bed Mobility    Comment, (Bed Mobility) chair  -LY chair  -LY      Row Name 06/22/25 1436 06/22/25 0955       Sit-Stand Transfer    Sit-Stand [place] (Transfers) verbal cues;contact guard  -LY verbal cues;contact guard  -LY      Row Name 06/22/25 1436 06/22/25 0955       Stand-Sit Transfer    Stand-Sit [place] (Transfers) verbal cues;contact guard  -LY verbal cues;contact guard  -LY      Row Name 06/22/25 143 06/22/25 0955       Gait/Stairs (Locomotion)    [place] Level (Gait) independent  -LY verbal cues;contact guard  -LY    Distance in Feet (Gait) 150  x2 reps with seated rest break  -LY --  50' 75' then 125' with seated rest breaks  -LY    Deviations/Abnormal Patterns (Gait) gait speed decreased;stride length decreased  -LY gait speed decreased;stride length decreased  -LY    Bilateral Gait  Deviations heel strike decreased  -LY heel strike decreased  -LY    Comment, (Gait/Stairs) cues for breathing technique, no c/o dizziness in PM  -LY very slow and guarded, frequent cues for breathing technique. Minimal dizziness noted, recovers quickly with seated rest break. nsg followed with chair  -LY      Row Name 06/22/25 1436 06/22/25 0955       Motor Skills    Comments, Therapeutic Exercise cardiac protocol  -LY cardiac protocol  -LY      Row Name             Wound 06/16/25 0817 sternal Surgical    Wound - Properties Group Placement Date: 06/16/25  - Placement Time: 0817  -LH Present on Original Admission: N  -LH Location: sternal  -LH Primary Wound Type: Surgical  -LH Additional Comments: Prevena  -LH    Retired Wound - Properties Group Placement Date: 06/16/25  - Placement Time: 0817  -LH Present on Original Admission: N  -LH Location: sternal  -LH Additional Comments: Prevena  -LH    Retired Wound - Properties Group Placement Date: 06/16/25  - Placement Time: 0817  -LH Present on Original Admission: N  -LH Location: sternal  -LH Additional Comments: Prevena  -LH    Retired Wound - Properties Group Date first assessed: 06/16/25  - Time first assessed: 0817  -LH Present on Original Admission: N  -LH Location: sternal  -LH Additional Comments: Prevena  -      Row Name             Wound 06/16/25 0817 Right anterior knee Surgical    Wound - Properties Group Placement Date: 06/16/25  - Placement Time: 0817  -LH Present on Original Admission: N  -LH Side: Right  -LH Orientation: anterior  -LH Location: knee  -LH Primary Wound Type: Surgical  -LH Additional Comments: mastisol, steri strips, mepilex, ace  -LH    Retired Wound - Properties Group Placement Date: 06/16/25  - Placement Time: 0817  -LH Present on Original Admission: N  -LH Side: Right  -LH Orientation: anterior  -LH Location: knee  -LH Additional Comments: mastisol, steri strips, mepilex, ace  -LH    Retired Wound - Properties Group  Placement Date: 06/16/25 - Placement Time: 0817 -LH Present on Original Admission: N  -LH Side: Right  -LH Orientation: anterior  -LH Location: knee  -LH Additional Comments: mastisol, steri strips, mepilex, ace  -LH    Retired Wound - Properties Group Date first assessed: 06/16/25 -LH Time first assessed: 0817 -LH Present on Original Admission: N  -LH Side: Right  -LH Location: knee  -LH Additional Comments: mastisol, steri strips, mepilex, ace  -LH      Row Name             Wound 06/16/25 0817 Right anterior groin Surgical    Wound - Properties Group Placement Date: 06/16/25 -LH Placement Time: 0817  -LH Side: Right  -LH Orientation: anterior  -LH Location: groin  -LH Primary Wound Type: Surgical  -LH Additional Comments: mastisol, steri strips, mepilex, ace  -LH    Retired Wound - Properties Group Placement Date: 06/16/25 -LH Placement Time: 0817 -LH Side: Right  -LH Orientation: anterior  -LH Location: groin  -LH Additional Comments: mastisol, steri strips, mepilex, ace  -LH    Retired Wound - Properties Group Placement Date: 06/16/25 -LH Placement Time: 0817 -LH Side: Right  -LH Orientation: anterior  -LH Location: groin  -LH Additional Comments: mastisol, steri strips, mepilex, ace  -LH    Retired Wound - Properties Group Date first assessed: 06/16/25 - Time first assessed: 0817  -LH Side: Right  -LH Location: groin  -LH Additional Comments: mastisol, steri strips, mepilex, ace  -LH      Row Name 06/22/25 0955          Plan of Care Review    Plan of Care Reviewed With patient  -LY     Progress improving  -LY     Outcome Evaluation PT tx completed. Pt up in chair, cont with chest tube with minimal pain. Nsg requests to follow pt with chair during ambulation to maximize safety. Pt amb 50', 75' then 125 ' with CGA and seated rest breaks. Pt reports some dizziness, however, recovered quickly with seated rest break. Frequent cues provided for breathing technique.Will cont to follow as pt tolerates.   -LY       Row Name 06/22/25 1436 06/22/25 0955       Positioning and Restraints    Pre-Treatment Position sitting in chair/recliner  -LY sitting in chair/recliner  -LY    Post Treatment Position chair  -LY chair  -LY    In Chair reclined;call light within reach;encouraged to call for assist;with nsg  -LY reclined;call light within reach;encouraged to call for assist  -LY              User Key  (r) = Recorded By, (t) = Taken By, (c) = Cosigned By      Initials Name Provider Type    Dania Ge, RN Registered Nurse    Pippa Boyle, PTA Physical Therapist Assistant                    Physical Therapy Education       Title: PT OT SLP Therapies (In Progress)       Topic: Physical Therapy (In Progress)       Point: Mobility training (In Progress)       Learning Progress Summary            Patient Acceptance, E,D, NR by KULWANT at 6/20/2025 1129    Comment: bed transfers    Acceptance, E, NR by GABRIELLA at 6/18/2025 1415    Comment: sternal prec, gait safety    Acceptance, E, VU,DU,NR by GABRIELLA at 6/18/2025 0800    Comment: benefits of activity, progression of PT, sternal prec    Acceptance, E, NR by SHAISTA at 6/17/2025 1438    Comment: continued sternal precautions, goal progression    Acceptance, E, VU by SHAISTA at 6/17/2025 1011    Comment: role of PT, sternal precvautions, progressive mobility and ambuakltion                      Point: Home exercise program (In Progress)       Learning Progress Summary            Patient Acceptance, E, NR by SHAISTA at 6/17/2025 1438    Comment: continued sternal precautions, goal progression    Acceptance, E, VU by SHAISTA at 6/17/2025 1011    Comment: role of PT, sternal precvautions, progressive mobility and ambuakltion                      Point: Body mechanics (In Progress)       Learning Progress Summary            Patient Acceptance, E, NR by SHAISTA at 6/17/2025 1438    Comment: continued sternal precautions, goal progression    Acceptance, E, VU by SHAISTA at 6/17/2025 1011    Comment: role of PT, sternal  precvautions, progressive mobility and ambuakltion                      Point: Precautions (Done)       Learning Progress Summary            Patient Acceptance, E, VU,DU,NR by GABRIELLA at 6/18/2025 0800    Comment: benefits of activity, progression of PT, sternal prec    Acceptance, E, NR by SHAISTA at 6/17/2025 1438    Comment: continued sternal precautions, goal progression    Acceptance, E, VU by SHAISTA at 6/17/2025 1011    Comment: role of PT, sternal precvautions, progressive mobility and ambuakltion                                      User Key       Initials Effective Dates Name Provider Type Discipline    GABRIELLA 02/03/23 -  Dereck Farias, PT DPT Physical Therapist PT    KULWANT 02/03/23 -  Vale Mack PTA Physical Therapist Assistant PT    SHAISTA 08/15/24 -  Andrew Palomino, PT DPT Physical Therapist PT                  PT Recommendation and Plan  Anticipated Discharge Disposition (PT): home with assist  Plan of Care Reviewed With: patient  Progress: improving  Outcome Evaluation: PT tx completed. Pt up in chair, cont with chest tube with minimal pain. Nsg requests to follow pt with chair during ambulation to maximize safety. Pt amb 50', 75' then 125 ' with CGA and seated rest breaks. Pt reports some dizziness, however, recovered quickly with seated rest break. Frequent cues provided for breathing technique.Will cont to follow as pt tolerates.   Outcome Measures       Row Name 06/20/25 1100             How much help from another person do you currently need...    Turning from your back to your side while in flat bed without using bedrails? 3  -KULWANT      Moving from lying on back to sitting on the side of a flat bed without bedrails? 3  -KULWANT      Moving to and from a bed to a chair (including a wheelchair)? 3  -KULWANT      Standing up from a chair using your arms (e.g., wheelchair, bedside chair)? 3  -KULWANT      Climbing 3-5 steps with a railing? 3  -KULWANT      To walk in hospital room? 3  -KULWANT      AM-PAC 6 Clicks Score (PT) 18  -KULWANT                 User Key  (r) = Recorded By, (t) = Taken By, (c) = Cosigned By      Initials Name Provider Type    Vale Dunne, GLORIA Physical Therapist Assistant                     Time Calculation:    PT Charges       Row Name 06/22/25 1511 06/22/25 1034          Time Calculation    Start Time 1436  -LY 0955  -LY     Stop Time 1506  -LY 1020  -LY     Time Calculation (min) 30 min  -LY 25 min  -LY     PT Received On 06/22/25  -LY 06/22/25  -LY        Time Calculation- PT    Total Timed Code Minutes- PT 30 minute(s)  -LY 25 minute(s)  -LY        Timed Charges    31793 - Gait Training Minutes  30  -LY 25  -LY        Total Minutes    Timed Charges Total Minutes 30  -LY 25  -LY      Total Minutes 30  -LY 25  -LY               User Key  (r) = Recorded By, (t) = Taken By, (c) = Cosigned By      Initials Name Provider Type    Pippa Boyle PTA Physical Therapist Assistant                  Therapy Charges for Today       Code Description Service Date Service Provider Modifiers Qty    08453065320 HC GAIT TRAINING EA 15 MIN 6/21/2025 Pipap Francis, PTA GP 2    99527681078 HC GAIT TRAINING EA 15 MIN 6/21/2025 Pippa Francis, PTA GP 1    89845987312 HC GAIT TRAINING EA 15 MIN 6/22/2025 Pippa Francis, PTA GP 2    68297433184 HC GAIT TRAINING EA 15 MIN 6/22/2025 Pippa Francis, PTA GP 2            PT G-Codes  Outcome Measure Options: AM-PAC 6 Clicks Basic Mobility (PT)  AM-PAC 6 Clicks Score (PT): 19    Pippa Francis PTA  6/22/2025

## 2025-06-22 NOTE — THERAPY TREATMENT NOTE
Acute Care - Physical Therapy Treatment Note  [facility]     Patient Name: Linden Montelongo  : 1945  MRN: [id number]  Today's Date: 2025      Visit Dx:     ICD-10-CM ICD-9-CM   1. Impaired mobility [Z74.09]  Z74.09 799.89   2. Coronary artery disease involving native coronary artery of native heart without angina pectoris  I25.10 414.01   3. Aortic valve stenosis  I35.0 424.1     Patient Active Problem List   Diagnosis    Coronary artery disease involving native coronary artery of native heart without angina pectoris    Aortic valve stenosis    Nonrheumatic aortic (valve) stenosis     Past Medical History:   Diagnosis Date    A-fib     Hyperlipidemia     Hypertension     LUKE on CPAP     Sleep apnea      Past Surgical History:   Procedure Laterality Date    AORTIC VALVE REPAIR/REPLACEMENT N/A 2025    Procedure: AORTIC VALVE REPLACEMENT 23MM, CORONARY ARTERY BYPASS GRAFTING X2, LEFT INTERNAL MAMMARY ARTERY GRAFT, RIGHT LEG ENDOSCOPIC VEIN HARVEST,  MAZE WITH ENCOMPASS CLAMP, CRYOABLATION, LEFT ATRIAL APPENDAGE EXCLUSION 40MM, TRANSESOPHAGEAL ECHOCARDIOGRAM;  Surgeon: Andrew Mcneil MD;  Location:  [facility];  Service: Cardiothoracic;  Laterality: N/A;    APPENDECTOMY      ATRIAL APPENDAGE EXCLUSION LEFT WITH TRANSESOPHAGEAL ECHOCARDIOGRAM N/A 2025    Procedure: ATRIAL APPENDAGE EXCLUSION LEFT WITH TRANSESOPHAGEAL ECHOCARDIOGRAM;  Surgeon: Andrew Mcneil MD;  Location:  [facility];  Service: Cardiothoracic;  Laterality: N/A;    CORONARY ARTERY BYPASS GRAFT N/A 2025    Procedure: CORONARY ARTERY BYPASS GRAFTING;  Surgeon: Andrew Mcneil MD;  Location:  [facility];  Service: Cardiothoracic;  Laterality: N/A;    ESOPHAGUS SURGERY      MAZE PROCEDURE N/A 2025    Procedure: MAZE PROCEDURE WITH ENCOMPASS CLAMP;  Surgeon: Andrew Mcneil MD;  Location:  [facility];  Service: Cardiothoracic;  Laterality: N/A;    REPLACEMENT TOTAL KNEE Right     TOTAL HIP ARTHROPLASTY Right      PT Assessment (Last 12  Hours)       PT Evaluation and Treatment       Row Name 06/22/25 0955          Physical Therapy Time and Intention    Subjective Information complains of;weakness;dizziness;dyspnea  -LY     Document Type therapy note (daily note)  -LY     Mode of Treatment physical therapy  -LY       Row Name 06/22/25 0955          General Information    Existing Precautions/Restrictions fall;sternal  chest tube  -LY       Row Name 06/22/25 0955          Pain    Pretreatment Pain Rating 4/10  -LY     Posttreatment Pain Rating 4/10  -LY     Pain Location back  -LY     Pain Side/Orientation left  -LY     Pain Management Interventions exercise or physical activity utilized  -LY     Response to Pain Interventions activity participation with tolerable pain  -LY       Row Name 06/22/25 0955          Bed Mobility    Comment, (Bed Mobility) chair  -LY       Row Name 06/22/25 0955          Sit-Stand Transfer    Sit-Stand [place] (Transfers) verbal cues;contact guard  -LY       Row Name 06/22/25 0955          Stand-Sit Transfer    Stand-Sit [place] (Transfers) verbal cues;contact guard  -LY       Row Name 06/22/25 0955          Gait/Stairs (Locomotion)    [place] Level (Gait) verbal cues;contact guard  -LY     Distance in Feet (Gait) --  50' 75' then 125' with seated rest breaks  -LY     Deviations/Abnormal Patterns (Gait) gait speed decreased;stride length decreased  -LY     Bilateral Gait Deviations heel strike decreased  -LY     Comment, (Gait/Stairs) very slow and guarded, frequent cues for breathing technique. Minimal dizziness noted, recovers quickly with seated rest break. nsg followed with chair  -LY       Row Name 06/22/25 0955          Motor Skills    Comments, Therapeutic Exercise cardiac protocol  -LY       Row Name             Wound 06/16/25 0817 sternal Surgical    Wound - Properties Group Placement Date: 06/16/25  - Placement Time: 0817  -LH Present on Original Admission: N  -LH Location: sternal  -LH Primary  Wound Type: Surgical  -LH Additional Comments: Prevena  -LH    Retired Wound - Properties Group Placement Date: 06/16/25  -LH Placement Time: 0817  -LH Present on Original Admission: N  -LH Location: sternal  -LH Additional Comments: Prevena  -LH    Retired Wound - Properties Group Placement Date: 06/16/25  -LH Placement Time: 0817  -LH Present on Original Admission: N  -LH Location: sternal  -LH Additional Comments: Prevena  -LH    Retired Wound - Properties Group Date first assessed: 06/16/25  -LH Time first assessed: 0817  -LH Present on Original Admission: N  -LH Location: sternal  -LH Additional Comments: Prevena  -LH      Row Name             Wound 06/16/25 0817 Right anterior knee Surgical    Wound - Properties Group Placement Date: 06/16/25  -LH Placement Time: 0817 -LH Present on Original Admission: N  -LH Side: Right  -LH Orientation: anterior  -LH Location: knee  -LH Primary Wound Type: Surgical  -LH Additional Comments: mastisol, steri strips, mepilex, ace  -LH    Retired Wound - Properties Group Placement Date: 06/16/25  - Placement Time: 0817  -LH Present on Original Admission: N  -LH Side: Right  -LH Orientation: anterior  -LH Location: knee  -LH Additional Comments: mastisol, steri strips, mepilex, ace  -LH    Retired Wound - Properties Group Placement Date: 06/16/25  - Placement Time: 0817  -LH Present on Original Admission: N  -LH Side: Right  -LH Orientation: anterior  -LH Location: knee  -LH Additional Comments: mastisol, steri strips, mepilex, ace  -LH    Retired Wound - Properties Group Date first assessed: 06/16/25  - Time first assessed: 0817  -LH Present on Original Admission: N  -LH Side: Right  -LH Location: knee  -LH Additional Comments: mastisol, steri strips, mepilex, ace  -LH      Row Name             Wound 06/16/25 0817 Right anterior groin Surgical    Wound - Properties Group Placement Date: 06/16/25  -LH Placement Time: 0817  -LH Side: Right  -LH Orientation: anterior  -LH  Location: groin  -LH Primary Wound Type: Surgical  -LH Additional Comments: mastisol, steri strips, mepilex, ace  -LH    Retired Wound - Properties Group Placement Date: 06/16/25 -LH Placement Time: 0817 -LH Side: Right  -LH Orientation: anterior  -LH Location: groin  -LH Additional Comments: mastisol, steri strips, mepilex, ace  -LH    Retired Wound - Properties Group Placement Date: 06/16/25 -LH Placement Time: 0817 -LH Side: Right  -LH Orientation: anterior  -LH Location: groin  -LH Additional Comments: mastisol, steri strips, mepilex, ace  -LH    Retired Wound - Properties Group Date first assessed: 06/16/25 -LH Time first assessed: 0817 -LH Side: Right  -LH Location: groin  -LH Additional Comments: mastisol, steri strips, mepilex, ace  -LH      Row Name 06/22/25 0955          Plan of Care Review    Plan of Care Reviewed With patient  -LY     Progress improving  -LY     Outcome Evaluation PT tx completed. Pt up in chair, cont with chest tube with minimal pain. Nsg requests to follow pt with chair during ambulation to maximize safety. Pt amb 50', 75' then 125 ' with CGA and seated rest breaks. Pt reports some dizziness, however, recovered quickly with seated rest break. Frequent cues provided for breathing technique.Will cont to follow as pt tolerates.  -LY       Row Name 06/22/25 0955          Positioning and Restraints    Pre-Treatment Position sitting in chair/recliner  -LY     Post Treatment Position chair  -LY     In Chair reclined;call light within reach;encouraged to call for assist  -LY               User Key  (r) = Recorded By, (t) = Taken By, (c) = Cosigned By      Initials Name Provider Type     Dania De La Rosa, RN Registered Nurse    Pippa Boyle PTA Physical Therapist Assistant                    Physical Therapy Education       Title: PT OT SLP Therapies (In Progress)       Topic: Physical Therapy (In Progress)       Point: Mobility training (In Progress)       Learning Progress Summary             Patient Acceptance, E,D, NR by KULWANT at 6/20/2025 1129    Comment: bed transfers    Acceptance, E, NR by GABRIELLA at 6/18/2025 1415    Comment: sternal prec, gait safety    Acceptance, E, VU,DU,NR by GABRIELLA at 6/18/2025 0800    Comment: benefits of activity, progression of PT, sternal prec    Acceptance, E, NR by SHAISTA at 6/17/2025 1438    Comment: continued sternal precautions, goal progression    Acceptance, E, VU by SHAISTA at 6/17/2025 1011    Comment: role of PT, sternal precvautions, progressive mobility and ambuakltion                      Point: Home exercise program (In Progress)       Learning Progress Summary            Patient Acceptance, E, NR by SHAISTA at 6/17/2025 1438    Comment: continued sternal precautions, goal progression    Acceptance, E, VU by SHAISTA at 6/17/2025 1011    Comment: role of PT, sternal precvautions, progressive mobility and ambuakltion                      Point: Body mechanics (In Progress)       Learning Progress Summary            Patient Acceptance, E, NR by SHAISTA at 6/17/2025 1438    Comment: continued sternal precautions, goal progression    Acceptance, E, VU by SHAISTA at 6/17/2025 1011    Comment: role of PT, sternal precvautions, progressive mobility and ambuakltion                      Point: Precautions (Done)       Learning Progress Summary            Patient Acceptance, E, VU,DU,NR by GABRIELLA at 6/18/2025 0800    Comment: benefits of activity, progression of PT, sternal prec    Acceptance, E, NR by SHAISTA at 6/17/2025 1438    Comment: continued sternal precautions, goal progression    Acceptance, E, VU by SHAISTA at 6/17/2025 1011    Comment: role of PT, sternal precvautions, progressive mobility and ambuakltion                                      User Key       Initials Effective Dates Name Provider Type Discipline    GABRIELLA 02/03/23 -  Dereck Farias, PT DPT Physical Therapist PT    KULWANT 02/03/23 -  Vale Mack, PTA Physical Therapist Assistant PT    SHAISTA 08/15/24 -  Andrew Palomino, PT DPT Physical  Therapist PT                  PT Recommendation and Plan  Anticipated Discharge Disposition (PT): home with assist  Plan of Care Reviewed With: patient  Progress: improving  Outcome Evaluation: PT tx completed. Pt up in chair, cont with chest tube with minimal pain. Nsg requests to follow pt with chair during ambulation to maximize safety. Pt amb 50', 75' then 125 ' with CGA and seated rest breaks. Pt reports some dizziness, however, recovered quickly with seated rest break. Frequent cues provided for breathing technique.Will cont to follow as pt tolerates.   Outcome Measures       Row Name 06/20/25 1100 06/19/25 1100          How much help from another person do you currently need...    Turning from your back to your side while in flat bed without using bedrails? 3  -KULWANT 4  -KULWANT     Moving from lying on back to sitting on the side of a flat bed without bedrails? 3  -KULWANT 3  -KULWANT     Moving to and from a bed to a chair (including a wheelchair)? 3  -KULWANT 3  -KULWANT     Standing up from a chair using your arms (e.g., wheelchair, bedside chair)? 3  -KULWANT 3  -KULWANT     Climbing 3-5 steps with a railing? 3  -KULWANT 3  -KULWANT     To walk in hospital room? 3  -KULWANT 3  -KULWANT     AM-PAC 6 Clicks Score (PT) 18  -KULWANT 19  -KULWANT               User Key  (r) = Recorded By, (t) = Taken By, (c) = Cosigned By      Initials Name Provider Type    Vale Dunne, PTA Physical Therapist Assistant                     Time Calculation:    PT Charges       Row Name 06/22/25 1034             Time Calculation    Start Time 0955  -LY      Stop Time 1020  -LY      Time Calculation (min) 25 min  -LY      PT Received On 06/22/25  -LY         Time Calculation- PT    Total Timed Code Minutes- PT 25 minute(s)  -LY         Timed Charges    63765 - Gait Training Minutes  25  -LY         Total Minutes    Timed Charges Total Minutes 25  -LY       Total Minutes 25  -LY                User Key  (r) = Recorded By, (t) = Taken By, (c) = Cosigned By      Initials Name Provider Type     Pippa Boyle PTA Physical Therapist Assistant                  Therapy Charges for Today       Code Description Service Date Service Provider Modifiers Qty    07358988009 HC GAIT TRAINING EA 15 MIN 6/21/2025 Pippa Francis, GLORIA GP 2    51166342839 HC GAIT TRAINING EA 15 MIN 6/21/2025 Pippa Francis, GLORIA GP 1    75205459656 HC GAIT TRAINING EA 15 MIN 6/22/2025 Pippa Francis, GLORIA GP 2            PT G-Codes  Outcome Measure Options: AM-PAC 6 Clicks Basic Mobility (PT)  AM-PAC 6 Clicks Score (PT): 19    Pippa Francis PTA  6/22/2025

## 2025-06-23 ENCOUNTER — APPOINTMENT (OUTPATIENT)
Dept: GENERAL RADIOLOGY | Facility: HOSPITAL | Age: 80
End: 2025-06-23
Payer: MEDICARE

## 2025-06-23 LAB
ANION GAP SERPL CALCULATED.3IONS-SCNC: 11 MMOL/L (ref 5–15)
BUN SERPL-MCNC: 12.6 MG/DL (ref 8–23)
BUN/CREAT SERPL: 15.8 (ref 7–25)
CALCIUM SPEC-SCNC: 8 MG/DL (ref 8.6–10.5)
CHLORIDE SERPL-SCNC: 101 MMOL/L (ref 98–107)
CO2 SERPL-SCNC: 23 MMOL/L (ref 22–29)
CREAT SERPL-MCNC: 0.8 MG/DL (ref 0.76–1.27)
DEPRECATED RDW RBC AUTO: 53.1 FL (ref 37–54)
EGFRCR SERPLBLD CKD-EPI 2021: 89.5 ML/MIN/1.73
ERYTHROCYTE [DISTWIDTH] IN BLOOD BY AUTOMATED COUNT: 16.1 % (ref 12.3–15.4)
GLUCOSE BLDC GLUCOMTR-MCNC: 112 MG/DL (ref 70–130)
GLUCOSE BLDC GLUCOMTR-MCNC: 118 MG/DL (ref 70–130)
GLUCOSE BLDC GLUCOMTR-MCNC: 120 MG/DL (ref 70–130)
GLUCOSE BLDC GLUCOMTR-MCNC: 129 MG/DL (ref 70–130)
GLUCOSE SERPL-MCNC: 103 MG/DL (ref 65–99)
HCT VFR BLD AUTO: 26.6 % (ref 37.5–51)
HGB BLD-MCNC: 8.7 G/DL (ref 13–17.7)
MAGNESIUM SERPL-MCNC: 2 MG/DL (ref 1.6–2.4)
MCH RBC QN AUTO: 29.6 PG (ref 26.6–33)
MCHC RBC AUTO-ENTMCNC: 32.7 G/DL (ref 31.5–35.7)
MCV RBC AUTO: 90.5 FL (ref 79–97)
PLATELET # BLD AUTO: 222 10*3/MM3 (ref 140–450)
PMV BLD AUTO: 9.8 FL (ref 6–12)
POTASSIUM SERPL-SCNC: 3.5 MMOL/L (ref 3.5–5.2)
RBC # BLD AUTO: 2.94 10*6/MM3 (ref 4.14–5.8)
SODIUM SERPL-SCNC: 135 MMOL/L (ref 136–145)
WBC NRBC COR # BLD AUTO: 6.23 10*3/MM3 (ref 3.4–10.8)

## 2025-06-23 PROCEDURE — 97116 GAIT TRAINING THERAPY: CPT

## 2025-06-23 PROCEDURE — 82948 REAGENT STRIP/BLOOD GLUCOSE: CPT

## 2025-06-23 PROCEDURE — 83735 ASSAY OF MAGNESIUM: CPT | Performed by: NURSE PRACTITIONER

## 2025-06-23 PROCEDURE — 25010000002 CALCIUM GLUCONATE-NACL 1-0.675 GM/50ML-% SOLUTION: Performed by: NURSE PRACTITIONER

## 2025-06-23 PROCEDURE — 80048 BASIC METABOLIC PNL TOTAL CA: CPT | Performed by: NURSE PRACTITIONER

## 2025-06-23 PROCEDURE — 25010000002 ENOXAPARIN PER 10 MG: Performed by: NURSE PRACTITIONER

## 2025-06-23 PROCEDURE — 85027 COMPLETE CBC AUTOMATED: CPT | Performed by: NURSE PRACTITIONER

## 2025-06-23 PROCEDURE — 71045 X-RAY EXAM CHEST 1 VIEW: CPT

## 2025-06-23 RX ORDER — CALCIUM GLUCONATE 20 MG/ML
1000 INJECTION, SOLUTION INTRAVENOUS ONCE
Status: COMPLETED | OUTPATIENT
Start: 2025-06-23 | End: 2025-06-23

## 2025-06-23 RX ADMIN — AMIODARONE HYDROCHLORIDE 200 MG: 200 TABLET ORAL at 17:04

## 2025-06-23 RX ADMIN — Medication 10 MG: at 20:53

## 2025-06-23 RX ADMIN — ACETAMINOPHEN 650 MG: 325 TABLET ORAL at 01:26

## 2025-06-23 RX ADMIN — METOPROLOL TARTRATE 25 MG: 25 TABLET, FILM COATED ORAL at 08:19

## 2025-06-23 RX ADMIN — ENOXAPARIN SODIUM 40 MG: 100 INJECTION SUBCUTANEOUS at 08:19

## 2025-06-23 RX ADMIN — FAMOTIDINE 20 MG: 20 TABLET, FILM COATED ORAL at 17:04

## 2025-06-23 RX ADMIN — ACETAMINOPHEN 650 MG: 325 TABLET ORAL at 06:45

## 2025-06-23 RX ADMIN — ACETAMINOPHEN 650 MG: 325 TABLET ORAL at 17:04

## 2025-06-23 RX ADMIN — ATORVASTATIN CALCIUM 20 MG: 10 TABLET, FILM COATED ORAL at 20:53

## 2025-06-23 RX ADMIN — AMIODARONE HYDROCHLORIDE 200 MG: 200 TABLET ORAL at 08:19

## 2025-06-23 RX ADMIN — FAMOTIDINE 20 MG: 20 TABLET, FILM COATED ORAL at 08:19

## 2025-06-23 RX ADMIN — METOPROLOL TARTRATE 25 MG: 25 TABLET, FILM COATED ORAL at 20:52

## 2025-06-23 RX ADMIN — CALCIUM GLUCONATE 1000 MG: 20 INJECTION, SOLUTION INTRAVENOUS at 11:14

## 2025-06-23 RX ADMIN — ACETAMINOPHEN 650 MG: 325 TABLET ORAL at 11:16

## 2025-06-23 RX ADMIN — ASPIRIN 81 MG: 81 TABLET, COATED ORAL at 08:19

## 2025-06-23 RX ADMIN — ACETAMINOPHEN 650 MG: 325 TABLET ORAL at 23:14

## 2025-06-23 NOTE — PLAN OF CARE
Goal Outcome Evaluation:  Plan of Care Reviewed With: patient        Progress: no change  Outcome Evaluation: VSS. Denies pain this shift. NSR 62-76 on tele. Pt did go into AF from 7942-9039 with rates in the 80's. Calcium gluconate given x1. Prevena removed. Pt reports no dizziness while walking. Left pleural tube patent and draining, so far 280cc out this shift. Safety maintained. Plan of care ongoing.

## 2025-06-23 NOTE — THERAPY TREATMENT NOTE
Acute Care - Physical Therapy Treatment Note  [facility]     Patient Name: Linden Montelongo  : 1945  MRN: [id number]  Today's Date: 2025      Visit Dx:     ICD-10-CM ICD-9-CM   1. Impaired mobility [Z74.09]  Z74.09 799.89   2. Coronary artery disease involving native coronary artery of native heart without angina pectoris  I25.10 414.01   3. Aortic valve stenosis  I35.0 424.1     Patient Active Problem List   Diagnosis    Coronary artery disease involving native coronary artery of native heart without angina pectoris    Aortic valve stenosis    Nonrheumatic aortic (valve) stenosis     Past Medical History:   Diagnosis Date    A-fib     Hyperlipidemia     Hypertension     LUKE on CPAP     Sleep apnea      Past Surgical History:   Procedure Laterality Date    AORTIC VALVE REPAIR/REPLACEMENT N/A 2025    Procedure: AORTIC VALVE REPLACEMENT 23MM, CORONARY ARTERY BYPASS GRAFTING X2, LEFT INTERNAL MAMMARY ARTERY GRAFT, RIGHT LEG ENDOSCOPIC VEIN HARVEST,  MAZE WITH ENCOMPASS CLAMP, CRYOABLATION, LEFT ATRIAL APPENDAGE EXCLUSION 40MM, TRANSESOPHAGEAL ECHOCARDIOGRAM;  Surgeon: Andrew Mcneil MD;  Location:  [facility];  Service: Cardiothoracic;  Laterality: N/A;    APPENDECTOMY      ATRIAL APPENDAGE EXCLUSION LEFT WITH TRANSESOPHAGEAL ECHOCARDIOGRAM N/A 2025    Procedure: ATRIAL APPENDAGE EXCLUSION LEFT WITH TRANSESOPHAGEAL ECHOCARDIOGRAM;  Surgeon: Andrew Mcneil MD;  Location:  [facility];  Service: Cardiothoracic;  Laterality: N/A;    CORONARY ARTERY BYPASS GRAFT N/A 2025    Procedure: CORONARY ARTERY BYPASS GRAFTING;  Surgeon: Andrew Mcneil MD;  Location:  [facility];  Service: Cardiothoracic;  Laterality: N/A;    ESOPHAGUS SURGERY      MAZE PROCEDURE N/A 2025    Procedure: MAZE PROCEDURE WITH ENCOMPASS CLAMP;  Surgeon: Andrew Mcneil MD;  Location:  [facility];  Service: Cardiothoracic;  Laterality: N/A;    REPLACEMENT TOTAL KNEE Right     TOTAL HIP ARTHROPLASTY Right      PT Assessment (Last 12  Hours)       PT Evaluation and Treatment       Row Name 06/23/25 1000          Physical Therapy Time and Intention    Subjective Information no complaints  -KJ     Document Type therapy note (daily note)  -KJ     Mode of Treatment physical therapy  -KJ     Patient Effort good  -KJ       Row Name 06/23/25 1000          General Information    Existing Precautions/Restrictions fall;sternal  prevena and chest tube  -KJ       Row Name 06/23/25 1000          Pain    Pretreatment Pain Rating 2/10  -KJ     Posttreatment Pain Rating 2/10  -KJ     Pain Location flank  -KJ     Pain Side/Orientation left  -KJ       Row Name 06/23/25 1000          Bed Mobility    Comment, (Bed Mobility) chair  -KJ       Row Name 06/23/25 1000          Sit-Stand Transfer    Sit-Stand [place] (Transfers) contact guard  -KJ       Row Name 06/23/25 1000          Stand-Sit Transfer    Stand-Sit [place] (Transfers) contact guard  -KJ     Comment, (Stand-Sit Transfer) review of sternal precautions  -KJ       Row Name 06/23/25 1000          Gait/Stairs (Locomotion)    [place] Level (Gait) contact guard  -KJ     Distance in Feet (Gait) 150  sitting rest; 200' back  -KJ     Deviations/Abnormal Patterns (Gait) gait speed decreased  -KJ     Comment, (Gait/Stairs) required sitting rest x1  -KJ       Row Name 06/23/25 1000          Motor Skills    Comments, Therapeutic Exercise cardiac protocol  -KJ       Row Name             Wound 06/16/25 0817 sternal Surgical    Wound - Properties Group Placement Date: 06/16/25  - Placement Time: 0817  -LH Present on Original Admission: N  -LH Location: sternal  -LH Primary Wound Type: Surgical  -LH Additional Comments: Prevena  -LH    Retired Wound - Properties Group Placement Date: 06/16/25  - Placement Time: 0817 - Present on Original Admission: N  -LH Location: sternal  -LH Additional Comments: Prevena  -LH    Retired Wound - Properties Group Placement Date: 06/16/25  - Placement Time: 0817  -  Present on Original Admission: N  -LH Location: sternal  -LH Additional Comments: Prevena  -LH    Retired Wound - Properties Group Date first assessed: 06/16/25  -LH Time first assessed: 0817 -LH Present on Original Admission: N  -LH Location: sternal  -LH Additional Comments: Prevena  -LH      Row Name             Wound 06/16/25 0817 Right anterior knee Surgical    Wound - Properties Group Placement Date: 06/16/25  -LH Placement Time: 0817  -LH Present on Original Admission: N  -LH Side: Right  -LH Orientation: anterior  -LH Location: knee  -LH Primary Wound Type: Surgical  -LH Additional Comments: mastisol, steri strips, mepilex, ace  -LH    Retired Wound - Properties Group Placement Date: 06/16/25  -LH Placement Time: 0817 -LH Present on Original Admission: N  -LH Side: Right  -LH Orientation: anterior  -LH Location: knee  -LH Additional Comments: mastisol, steri strips, mepilex, ace  -LH    Retired Wound - Properties Group Placement Date: 06/16/25  - Placement Time: 0817 -LH Present on Original Admission: N  -LH Side: Right  -LH Orientation: anterior  -LH Location: knee  -LH Additional Comments: mastisol, steri strips, mepilex, ace  -LH    Retired Wound - Properties Group Date first assessed: 06/16/25  -LH Time first assessed: 0817 -LH Present on Original Admission: N  -LH Side: Right  -LH Location: knee  -LH Additional Comments: mastisol, steri strips, mepilex, ace  -LH      Row Name             Wound 06/16/25 0817 Right anterior groin Surgical    Wound - Properties Group Placement Date: 06/16/25  -LH Placement Time: 0817 -LH Side: Right  -LH Orientation: anterior  -LH Location: groin  -LH Primary Wound Type: Surgical  -LH Additional Comments: mastisol, steri strips, mepilex, ace  -LH    Retired Wound - Properties Group Placement Date: 06/16/25  -LH Placement Time: 0817 -LH Side: Right  -LH Orientation: anterior  -LH Location: groin  -LH Additional Comments: mastisol, steri strips, mepilex, ace  -LH     Retired Wound - Properties Group Placement Date: 06/16/25  - Placement Time: 0817 -LH Side: Right  -LH Orientation: anterior  -LH Location: groin  -LH Additional Comments: mastisol, steri strips, mepilex, ace  -LH    Retired Wound - Properties Group Date first assessed: 06/16/25  - Time first assessed: 0817 -LH Side: Right  -LH Location: groin  -LH Additional Comments: mastisol, steri strips, mepilex, ace  -LH      Row Name 06/23/25 1000          Positioning and Restraints    Pre-Treatment Position sitting in chair/recliner  -KJ     Post Treatment Position chair  -KJ               User Key  (r) = Recorded By, (t) = Taken By, (c) = Cosigned By      Initials Name Provider Type    Maryann Lombardi, GLORIA Physical Therapist Assistant     Dania De La Rosa RN Registered Nurse                    Physical Therapy Education       Title: PT OT SLP Therapies (In Progress)       Topic: Physical Therapy (In Progress)       Point: Mobility training (In Progress)       Learning Progress Summary            Patient Acceptance, E,D, NR by KULWANT at 6/20/2025 1129    Comment: bed transfers    Acceptance, E, NR by GABRIELLA at 6/18/2025 1415    Comment: sternal prec, gait safety    Acceptance, E, VU,DU,NR by GABRIELLA at 6/18/2025 0800    Comment: benefits of activity, progression of PT, sternal prec    Acceptance, E, NR by SHAISTA at 6/17/2025 1438    Comment: continued sternal precautions, goal progression    Acceptance, E, VU by SHAISTA at 6/17/2025 1011    Comment: role of PT, sternal precvautions, progressive mobility and ambuakltion                      Point: Home exercise program (In Progress)       Learning Progress Summary            Patient Acceptance, E, NR by SHAISTA at 6/17/2025 1438    Comment: continued sternal precautions, goal progression    Acceptance, E, VU by SHAISTA at 6/17/2025 1011    Comment: role of PT, sternal precvautions, progressive mobility and ambuakltion                      Point: Body mechanics (In Progress)       Learning Progress  Summary            Patient Acceptance, E, NR by SHAISTA at 6/17/2025 1438    Comment: continued sternal precautions, goal progression    Acceptance, E, VU by SHAISTA at 6/17/2025 1011    Comment: role of PT, sternal precvautions, progressive mobility and ambuakltion                      Point: Precautions (Done)       Learning Progress Summary            Patient Acceptance, E, VU,DU,NR by GABRIELLA at 6/18/2025 0800    Comment: benefits of activity, progression of PT, sternal prec    Acceptance, E, NR by SHAISTA at 6/17/2025 1438    Comment: continued sternal precautions, goal progression    Acceptance, E, VU by SHAISTA at 6/17/2025 1011    Comment: role of PT, sternal precvautions, progressive mobility and ambuakltion                                      User Key       Initials Effective Dates Name Provider Type Discipline    GABRIELLA 02/03/23 -  Dereck Farisa, PT DPT Physical Therapist PT    KULWANT 02/03/23 -  Vale Mack PTA Physical Therapist Assistant PT    SHAISTA 08/15/24 -  Andrew Palomino, PT DPT Physical Therapist PT                  PT Recommendation and Plan             Time Calculation:    PT Charges       Row Name 06/23/25 1107             Time Calculation    Start Time 1000  -KJ      Stop Time 1030  -KJ      Time Calculation (min) 30 min  -KJ      PT Received On 06/23/25  -KJ      PT Goal Re-Cert Due Date 06/27/25  -KJ         Time Calculation- PT    Total Timed Code Minutes- PT 30 minute(s)  -KJ                User Key  (r) = Recorded By, (t) = Taken By, (c) = Cosigned By      Initials Name Provider Type    Maryann Lombardi PTA Physical Therapist Assistant                  Therapy Charges for Today       Code Description Service Date Service Provider Modifiers Qty    82417693407 HC GAIT TRAINING EA 15 MIN 6/23/2025 Maraynn Mazariegos PTA GP 2            PT G-Codes  Outcome Measure Options: AM-PAC 6 Clicks Basic Mobility (PT)  AM-PAC 6 Clicks Score (PT): 17    Maryann Mazariegos PTA  6/23/2025

## 2025-06-23 NOTE — PLAN OF CARE
Goal Outcome Evaluation:  Plan of Care Reviewed With: patient        Progress: no change  Outcome Evaluation: Patient c/o anxiety and being restless during the night, relaxation techniques promoted. Active listening utilized. Repositioned patient as needed. Patient c/o mild left flank pain at times throughout the shift, scheduled PO Tylenol given as ordered with good relief noted. Chest tube~ 110 mL output this shift. Prevena in place. Bed alarm on when patient is in bed. Patient up in the chair currently. Chair alarm in use. Up x 1-2. Patient ambulated to room 461 and back with one rest break. Safety maintained.

## 2025-06-23 NOTE — PROGRESS NOTES
Enter Query Response Below      Query Response: Chronic diastolic heart failure with non-cardiogenic volume overload only     Electronically signed by Andrew Mcneil MD, 06/23/25, 8:34 AM CDT.               If applicable, please update the problem list.

## 2025-06-23 NOTE — PROGRESS NOTES
"Patient name: Linden Montelongo  Patient : 1945  VISIT # [id number]  MR #[id number]    Procedure:Procedure(s):  AORTIC VALVE REPLACEMENT 23MM, CORONARY ARTERY BYPASS GRAFTING X2, LEFT INTERNAL MAMMARY ARTERY GRAFT, RIGHT LEG ENDOSCOPIC VEIN HARVEST,  MAZE WITH ENCOMPASS CLAMP, CRYOABLATION, LEFT ATRIAL APPENDAGE EXCLUSION 40MM, TRANSESOPHAGEAL ECHOCARDIOGRAM  CORONARY ARTERY BYPASS GRAFTING  MAZE PROCEDURE WITH ENCOMPASS CLAMP  ATRIAL APPENDAGE EXCLUSION LEFT WITH TRANSESOPHAGEAL ECHOCARDIOGRAM  Procedure Date:2025  POD:7 Days Post-Op    Subjective     On room air.  Weight is down 2 pounds and he is 4 pounds above his baseline.  He is having bowel movements.  He states he had a much better walk this morning with less dizziness.    Telemetry: Sinus rhythm 60s  IV drips: None         Objective     Visit Vitals  /66 (BP Location: Right arm, Patient Position: Lying)   Pulse 68   Temp 98.2 °F (36.8 °C) (Oral)   Resp 18   Ht 177 cm (69.69\")   Wt 110 kg (242 lb 3.2 oz)   SpO2 94%   BMI 35.07 kg/m²   Baseline weight 238 pounds    Intake/Output Summary (Last 24 hours) at 2025 0848  Last data filed at 2025 0645  Gross per 24 hour   Intake 330 ml   Output 1870 ml   Net -1540 ml     Left pleural drain: 345 mL in 24 hours, serosanguineous, no airleak    Lab:     CBC:  Results from last 7 days   Lab Units 25  0341 25  0406 25  0356   WBC 10*3/mm3 6.23 7.78 6.33   HEMATOCRIT % 26.6* 30.2* 26.9*   PLATELETS 10*3/mm3 222 185 170          BMP:  Results from last 7 days   Lab Units 25  0341 25  0406 25  0356   SODIUM mmol/L 135* 133* 135*   POTASSIUM mmol/L 3.5 3.8 3.7   CHLORIDE mmol/L 101 101 100   CO2 mmol/L 23.0 21.0* 23.0   GLUCOSE mg/dL 103* 107* 111*   BUN mg/dL 12.6 13.6 14.7   CREATININE mg/dL 0.80 0.84 0.73*          COAG:  Results from last 7 days   Lab Units 25  2225   INR  0.96   APTT seconds 38.4*       IMAGES:       Imaging Results (Last 24 Hours)       " Procedure Component Value Units Date/Time    XR Chest 1 View [[id number]] Collected: 06/23/25 0635     Updated: 06/23/25 0641    Narrative:      EXAMINATION: XR CHEST 1 VW-        HISTORY: Post-Op Heart Surgery     A frontal projection of the chest is compared with the previous study  dated 6/22/2025.     The lungs are poorly expanded.     Left upper chest pneumothorax appears smaller than the previous study.     The left-sided chest tube is in place. No change.     There is a mild pulmonary venous congestion.     There are atelectatic change in the lower lungs left more than the  right.     There is moderate cardiomegaly. There is evidence of prior cardiac  surgery. Left atrial appendage clamp is in place.     No acute bony abnormality.       Impression:      1. Left upper chest pneumothorax appears smaller than the previous  study. Left-sided chest tube in place.  2. A mild pulmonary venous congestion.  3. Small bibasilar pleural effusion and bibasilar atelectatic changes,  left more than the right.                    This report was signed and finalized on 6/23/2025 6:38 AM by Dr. Casie Berkowitz MD.             CXR: Left chest tube in stable position with trace left apical pneumothorax.  Bibasilar atelectasis.    Physical Exam:  General: Alert, oriented. No apparent distress.   Cardiovascular: Regular rate and rhythm without murmur, rubs, or gallops.    Pulmonary: Clear to auscultation bilaterally without wheezing, rubs, or rales.  Chest: Sternotomy incision clean, dry, and intact. Sternum stable. No clicks.  Left chest tube to 20 cm suction. No air leak. Fluid is serosanguineous  Abdomen: Soft, nondistended, and nontender.  Extremities: Warm, moves all extremities. No edema. Saphenectomy site clean, dry, and intact  Neurologic:  Grossly intact with no focal deficits.            Impression:  Aortic valve stenosis  Coronary artery disease  Paroxysmal atrial  fibrillation  Hyperlipidemia  Hypertension      Plan:  Calcium gluconate 1 g x 1 dose this morning  Remove Prevena  Okay for melatonin at night for sleep  Routine postcardiac surgery care  Encourage pulmonary toilet ambulation  Keep chest tube as output is too high for removal  Likely plan on discharge home tomorrow if chest tube output decreases  Discussed with patient and nursing      Maryann Chavez, JUDE  06/23/25  08:48 CDT

## 2025-06-23 NOTE — THERAPY TREATMENT NOTE
Acute Care - Physical Therapy Treatment Note  [facility]     Patient Name: Linden Montelongo  : 1945  MRN: [id number]  Today's Date: 2025      Visit Dx:     ICD-10-CM ICD-9-CM   1. Impaired mobility [Z74.09]  Z74.09 799.89   2. Coronary artery disease involving native coronary artery of native heart without angina pectoris  I25.10 414.01   3. Aortic valve stenosis  I35.0 424.1     Patient Active Problem List   Diagnosis    Coronary artery disease involving native coronary artery of native heart without angina pectoris    Aortic valve stenosis    Nonrheumatic aortic (valve) stenosis     Past Medical History:   Diagnosis Date    A-fib     Hyperlipidemia     Hypertension     LUKE on CPAP     Sleep apnea      Past Surgical History:   Procedure Laterality Date    AORTIC VALVE REPAIR/REPLACEMENT N/A 2025    Procedure: AORTIC VALVE REPLACEMENT 23MM, CORONARY ARTERY BYPASS GRAFTING X2, LEFT INTERNAL MAMMARY ARTERY GRAFT, RIGHT LEG ENDOSCOPIC VEIN HARVEST,  MAZE WITH ENCOMPASS CLAMP, CRYOABLATION, LEFT ATRIAL APPENDAGE EXCLUSION 40MM, TRANSESOPHAGEAL ECHOCARDIOGRAM;  Surgeon: Andrew Mcneil MD;  Location:  [facility];  Service: Cardiothoracic;  Laterality: N/A;    APPENDECTOMY      ATRIAL APPENDAGE EXCLUSION LEFT WITH TRANSESOPHAGEAL ECHOCARDIOGRAM N/A 2025    Procedure: ATRIAL APPENDAGE EXCLUSION LEFT WITH TRANSESOPHAGEAL ECHOCARDIOGRAM;  Surgeon: Andrew Mcneil MD;  Location:  [facility];  Service: Cardiothoracic;  Laterality: N/A;    CORONARY ARTERY BYPASS GRAFT N/A 2025    Procedure: CORONARY ARTERY BYPASS GRAFTING;  Surgeon: Andrew Mcneil MD;  Location:  [facility];  Service: Cardiothoracic;  Laterality: N/A;    ESOPHAGUS SURGERY      MAZE PROCEDURE N/A 2025    Procedure: MAZE PROCEDURE WITH ENCOMPASS CLAMP;  Surgeon: Andrew Mcneil MD;  Location:  [facility];  Service: Cardiothoracic;  Laterality: N/A;    REPLACEMENT TOTAL KNEE Right     TOTAL HIP ARTHROPLASTY Right      PT Assessment (Last 12  Hours)       PT Evaluation and Treatment       Row Name 06/23/25 1515 06/23/25 1000       Physical Therapy Time and Intention    Subjective Information no complaints  -KJ no complaints  -KJ    Document Type therapy note (daily note)  -KJ therapy note (daily note)  -KJ    Mode of Treatment physical therapy  -KJ physical therapy  -KJ    Patient Effort good  -KJ good  -KJ      Row Name 06/23/25 1515 06/23/25 1000       General Information    Existing Precautions/Restrictions fall;sternal  prevena and chest tube  -KJ fall;sternal  prevena and chest tube  -KJ      Row Name 06/23/25 1515 06/23/25 1000       Pain    Pretreatment Pain Rating 2/10  -KJ 2/10  -KJ    Posttreatment Pain Rating 2/10  -KJ 2/10  -KJ    Pain Location flank  -KJ flank  -KJ    Pain Side/Orientation left  -KJ left  -KJ      Row Name 06/23/25 1515 06/23/25 1000       Bed Mobility    Comment, (Bed Mobility) chair  -KJ chair  -KJ      Row Name 06/23/25 1515 06/23/25 1000       Sit-Stand Transfer    Sit-Stand [place] (Transfers) contact guard  -KJ contact guard  -KJ      Row Name 06/23/25 1515 06/23/25 1000       Stand-Sit Transfer    Stand-Sit [place] (Transfers) contact guard  -KJ contact guard  -KJ    Comment, (Stand-Sit Transfer) -- review of sternal precautions  -KJ      Row Name 06/23/25 1515 06/23/25 1000       Gait/Stairs (Locomotion)    [place] Level (Gait) contact guard  -KJ contact guard  -KJ    Distance in Feet (Gait) 175  sitting rest 175' back to room  -  sitting rest; 200' back  -KJ    Deviations/Abnormal Patterns (Gait) gait speed decreased  -KJ gait speed decreased  -KJ    Comment, (Gait/Stairs) required sitting rest  -KJ required sitting rest x1  -KJ      Row Name 06/23/25 1000          Motor Skills    Comments, Therapeutic Exercise cardiac protocol  -KJ       Row Name             Wound 06/16/25 0817 sternal Surgical    Wound - Properties Group Placement Date: 06/16/25  - Placement Time: 0817 - Present on  Original Admission: N  -LH Location: sternal  -LH Primary Wound Type: Surgical  -LH Additional Comments: Prevena  -LH    Retired Wound - Properties Group Placement Date: 06/16/25  - Placement Time: 0817  -LH Present on Original Admission: N  -LH Location: sternal  -LH Additional Comments: Prevena  -LH    Retired Wound - Properties Group Placement Date: 06/16/25  - Placement Time: 0817  -LH Present on Original Admission: N  -LH Location: sternal  -LH Additional Comments: Prevena  -LH    Retired Wound - Properties Group Date first assessed: 06/16/25  - Time first assessed: 0817  -LH Present on Original Admission: N  -LH Location: sternal  -LH Additional Comments: Prevena  -LH      Row Name             Wound 06/16/25 0817 Right anterior knee Surgical    Wound - Properties Group Placement Date: 06/16/25  - Placement Time: 0817 -LH Present on Original Admission: N  -LH Side: Right  -LH Orientation: anterior  -LH Location: knee  -LH Primary Wound Type: Surgical  -LH Additional Comments: mastisol, steri strips, mepilex, ace  -LH    Retired Wound - Properties Group Placement Date: 06/16/25  - Placement Time: 0817  -LH Present on Original Admission: N  -LH Side: Right  -LH Orientation: anterior  -LH Location: knee  -LH Additional Comments: mastisol, steri strips, mepilex, ace  -LH    Retired Wound - Properties Group Placement Date: 06/16/25  - Placement Time: 0817  -LH Present on Original Admission: N  -LH Side: Right  -LH Orientation: anterior  -LH Location: knee  -LH Additional Comments: mastisol, steri strips, mepilex, ace  -LH    Retired Wound - Properties Group Date first assessed: 06/16/25  - Time first assessed: 0817  -LH Present on Original Admission: N  -LH Side: Right  -LH Location: knee  -LH Additional Comments: mastisol, steri strips, mepilex, ace  -LH      Row Name             Wound 06/16/25 0817 Right anterior groin Surgical    Wound - Properties Group Placement Date: 06/16/25  - Placement  Time: 0817 -LH Side: Right  -LH Orientation: anterior  -LH Location: groin  -LH Primary Wound Type: Surgical  -LH Additional Comments: mastisol, steri strips, mepilex, ace  -LH    Retired Wound - Properties Group Placement Date: 06/16/25 - Placement Time: 0817 -LH Side: Right  -LH Orientation: anterior  -LH Location: groin  -LH Additional Comments: mastisol, steri strips, mepilex, ace  -LH    Retired Wound - Properties Group Placement Date: 06/16/25 - Placement Time: 0817 -LH Side: Right  -LH Orientation: anterior  -LH Location: groin  -LH Additional Comments: mastisol, steri strips, mepilex, ace  -LH    Retired Wound - Properties Group Date first assessed: 06/16/25 - Time first assessed: 0817 -LH Side: Right  -LH Location: groin  -LH Additional Comments: mastisol, steri strips, mepilex, ace  -LH      Row Name 06/23/25 1515 06/23/25 1000       Positioning and Restraints    Pre-Treatment Position sitting in chair/recliner  -KJ sitting in chair/recliner  -KJ    Post Treatment Position chair  -KJ chair  -KJ              User Key  (r) = Recorded By, (t) = Taken By, (c) = Cosigned By      Initials Name Provider Type    Maryann Lombardi PTA Physical Therapist Assistant     Dania De La Rosa RN Registered Nurse                    Physical Therapy Education       Title: PT OT SLP Therapies (In Progress)       Topic: Physical Therapy (In Progress)       Point: Mobility training (In Progress)       Learning Progress Summary            Patient Acceptance, E,D, NR by KULWANT at 6/20/2025 1129    Comment: bed transfers    Acceptance, E, NR by GABRIELLA at 6/18/2025 1415    Comment: sternal prec, gait safety    Acceptance, E, VU,DU,NR by GABRIELLA at 6/18/2025 0800    Comment: benefits of activity, progression of PT, sternal prec    Acceptance, E, NR by SHAISTA at 6/17/2025 1438    Comment: continued sternal precautions, goal progression    Acceptance, E, VU by SHAISTA at 6/17/2025 1011    Comment: role of PT, sternal precvautions,  progressive mobility and ambuakltion                      Point: Home exercise program (In Progress)       Learning Progress Summary            Patient Acceptance, E, NR by HSAISTA at 6/17/2025 1438    Comment: continued sternal precautions, goal progression    Acceptance, E, VU by SHAISTA at 6/17/2025 1011    Comment: role of PT, sternal precvautions, progressive mobility and ambuakltion                      Point: Body mechanics (In Progress)       Learning Progress Summary            Patient Acceptance, E, NR by SHAISTA at 6/17/2025 1438    Comment: continued sternal precautions, goal progression    Acceptance, E, VU by SHAISTA at 6/17/2025 1011    Comment: role of PT, sternal precvautions, progressive mobility and ambuakltion                      Point: Precautions (Done)       Learning Progress Summary            Patient Acceptance, E, VU,DU,NR by GABRIELLA at 6/18/2025 0800    Comment: benefits of activity, progression of PT, sternal prec    Acceptance, E, NR by SHAISTA at 6/17/2025 1438    Comment: continued sternal precautions, goal progression    Acceptance, E, VU by SHAISTA at 6/17/2025 1011    Comment: role of PT, sternal precvautions, progressive mobility and ambuakltion                                      User Key       Initials Effective Dates Name Provider Type Discipline    GABRIELLA 02/03/23 -  Dereck Farias, PT DPT Physical Therapist PT    KULWANT 02/03/23 -  Vale Mack, PTA Physical Therapist Assistant PT     08/15/24 -  Andrew Palomino, TANGELA DPT Physical Therapist PT                  PT Recommendation and Plan             Time Calculation:    PT Charges       Row Name 06/23/25 1610 06/23/25 1107          Time Calculation    Start Time 1515  -KJ 1000  -KJ     Stop Time 1540  -KJ 1030  -KJ     Time Calculation (min) 25 min  -KJ 30 min  -KJ     PT Received On -- 06/23/25  -KJ     PT Goal Re-Cert Due Date -- 06/27/25  -KJ        Time Calculation- PT    Total Timed Code Minutes- PT 15 minute(s)  -KJ 30 minute(s)  -KJ               User Key   (r) = Recorded By, (t) = Taken By, (c) = Cosigned By      Initials Name Provider Type    Maryann Lombardi PTA Physical Therapist Assistant                  Therapy Charges for Today       Code Description Service Date Service Provider Modifiers Qty    92722539685 HC GAIT TRAINING EA 15 MIN 6/23/2025 Maryann Mazariegos PTA GP 2    65044667225 HC GAIT TRAINING EA 15 MIN 6/23/2025 Maryann Mazariegos, GLORIA GP 2            PT G-Codes  Outcome Measure Options: AM-PAC 6 Clicks Basic Mobility (PT)  AM-PAC 6 Clicks Score (PT): 17    Maryann Mazariegos PTA  6/23/2025

## 2025-06-23 NOTE — PROGRESS NOTES
Nutrition Services    Patient Name:  Linden Montelongo  YOB: 1945  MRN: [id number]  Admit Date:  6/16/2025    Nutrition screening and chart review completed per length of stay protocol. Pt remains at low risk/no risk for malnutrition. Pt up 4 lbs admission to present, pt states  lbs. Pt currently on Cardiac Diet; consumed 63% of 4 meals observed. PO Fluid intake: 480 mL, UOP 1525 mL. Last BM 6/22. Continue with daily menu selections and observe food preferences. Will monitor while inpatient and follow per protocol.      Electronically signed by:  Ben Brooks  06/23/25 14:19 CDT

## 2025-06-24 ENCOUNTER — APPOINTMENT (OUTPATIENT)
Dept: GENERAL RADIOLOGY | Facility: HOSPITAL | Age: 80
End: 2025-06-24
Payer: MEDICARE

## 2025-06-24 LAB
ANION GAP SERPL CALCULATED.3IONS-SCNC: 12 MMOL/L (ref 5–15)
BUN SERPL-MCNC: 14.9 MG/DL (ref 8–23)
BUN/CREAT SERPL: 19.1 (ref 7–25)
CALCIUM SPEC-SCNC: 8.3 MG/DL (ref 8.6–10.5)
CHLORIDE SERPL-SCNC: 102 MMOL/L (ref 98–107)
CO2 SERPL-SCNC: 22 MMOL/L (ref 22–29)
CREAT SERPL-MCNC: 0.78 MG/DL (ref 0.76–1.27)
DEPRECATED RDW RBC AUTO: 53.5 FL (ref 37–54)
EGFRCR SERPLBLD CKD-EPI 2021: 90.2 ML/MIN/1.73
ERYTHROCYTE [DISTWIDTH] IN BLOOD BY AUTOMATED COUNT: 16.4 % (ref 12.3–15.4)
GLUCOSE BLDC GLUCOMTR-MCNC: 106 MG/DL (ref 70–130)
GLUCOSE BLDC GLUCOMTR-MCNC: 107 MG/DL (ref 70–130)
GLUCOSE BLDC GLUCOMTR-MCNC: 121 MG/DL (ref 70–130)
GLUCOSE BLDC GLUCOMTR-MCNC: 128 MG/DL (ref 70–130)
GLUCOSE SERPL-MCNC: 111 MG/DL (ref 65–99)
HCT VFR BLD AUTO: 27.9 % (ref 37.5–51)
HGB BLD-MCNC: 9 G/DL (ref 13–17.7)
MCH RBC QN AUTO: 29 PG (ref 26.6–33)
MCHC RBC AUTO-ENTMCNC: 32.3 G/DL (ref 31.5–35.7)
MCV RBC AUTO: 90 FL (ref 79–97)
PLATELET # BLD AUTO: 257 10*3/MM3 (ref 140–450)
PMV BLD AUTO: 9.8 FL (ref 6–12)
POTASSIUM SERPL-SCNC: 3.6 MMOL/L (ref 3.5–5.2)
RBC # BLD AUTO: 3.1 10*6/MM3 (ref 4.14–5.8)
SODIUM SERPL-SCNC: 136 MMOL/L (ref 136–145)
WBC NRBC COR # BLD AUTO: 8.7 10*3/MM3 (ref 3.4–10.8)

## 2025-06-24 PROCEDURE — 80048 BASIC METABOLIC PNL TOTAL CA: CPT | Performed by: NURSE PRACTITIONER

## 2025-06-24 PROCEDURE — 25010000002 FUROSEMIDE PER 20 MG: Performed by: NURSE PRACTITIONER

## 2025-06-24 PROCEDURE — 85027 COMPLETE CBC AUTOMATED: CPT | Performed by: NURSE PRACTITIONER

## 2025-06-24 PROCEDURE — 97116 GAIT TRAINING THERAPY: CPT

## 2025-06-24 PROCEDURE — 82948 REAGENT STRIP/BLOOD GLUCOSE: CPT

## 2025-06-24 PROCEDURE — 71045 X-RAY EXAM CHEST 1 VIEW: CPT

## 2025-06-24 PROCEDURE — 25010000002 ENOXAPARIN PER 10 MG: Performed by: NURSE PRACTITIONER

## 2025-06-24 RX ORDER — POTASSIUM CHLORIDE 1500 MG/1
40 TABLET, EXTENDED RELEASE ORAL ONCE
Status: COMPLETED | OUTPATIENT
Start: 2025-06-24 | End: 2025-06-24

## 2025-06-24 RX ORDER — FUROSEMIDE 10 MG/ML
20 INJECTION INTRAMUSCULAR; INTRAVENOUS ONCE
Status: COMPLETED | OUTPATIENT
Start: 2025-06-24 | End: 2025-06-24

## 2025-06-24 RX ADMIN — POTASSIUM CHLORIDE 40 MEQ: 1500 TABLET, EXTENDED RELEASE ORAL at 09:07

## 2025-06-24 RX ADMIN — ENOXAPARIN SODIUM 40 MG: 100 INJECTION SUBCUTANEOUS at 09:07

## 2025-06-24 RX ADMIN — METOPROLOL TARTRATE 25 MG: 25 TABLET, FILM COATED ORAL at 09:07

## 2025-06-24 RX ADMIN — ACETAMINOPHEN 650 MG: 325 TABLET ORAL at 12:12

## 2025-06-24 RX ADMIN — ACETAMINOPHEN 650 MG: 325 TABLET ORAL at 06:13

## 2025-06-24 RX ADMIN — FUROSEMIDE 20 MG: 10 INJECTION, SOLUTION INTRAMUSCULAR; INTRAVENOUS at 09:07

## 2025-06-24 RX ADMIN — ACETAMINOPHEN 650 MG: 325 TABLET ORAL at 17:58

## 2025-06-24 RX ADMIN — Medication 10 MG: at 20:22

## 2025-06-24 RX ADMIN — ASPIRIN 81 MG: 81 TABLET, COATED ORAL at 09:07

## 2025-06-24 RX ADMIN — AMIODARONE HYDROCHLORIDE 200 MG: 200 TABLET ORAL at 17:58

## 2025-06-24 RX ADMIN — METOPROLOL TARTRATE 25 MG: 25 TABLET, FILM COATED ORAL at 20:22

## 2025-06-24 RX ADMIN — AMIODARONE HYDROCHLORIDE 200 MG: 200 TABLET ORAL at 09:09

## 2025-06-24 RX ADMIN — ATORVASTATIN CALCIUM 20 MG: 10 TABLET, FILM COATED ORAL at 20:22

## 2025-06-24 NOTE — PROGRESS NOTES
"Patient name: Linden Montelongo  Patient : 1945  VISIT # [id number]  MR #[id number]    Procedure:Procedure(s):  AORTIC VALVE REPLACEMENT 23MM, CORONARY ARTERY BYPASS GRAFTING X2, LEFT INTERNAL MAMMARY ARTERY GRAFT, RIGHT LEG ENDOSCOPIC VEIN HARVEST,  MAZE WITH ENCOMPASS CLAMP, CRYOABLATION, LEFT ATRIAL APPENDAGE EXCLUSION 40MM, TRANSESOPHAGEAL ECHOCARDIOGRAM  CORONARY ARTERY BYPASS GRAFTING  MAZE PROCEDURE WITH ENCOMPASS CLAMP  ATRIAL APPENDAGE EXCLUSION LEFT WITH TRANSESOPHAGEAL ECHOCARDIOGRAM  Procedure Date:2025  POD:8 Days Post-Op    Subjective     On room air.  Weight is down 2 pounds and he is 2 pounds above his baseline.  He is having bowel movements.  Walking 175 to 200 feet with PT.  Converted to sinus rhythm at 1322 yesterday and has remained in sinus.  Dizziness continues to improve.    Telemetry: Sinus rhythm 64-76  IV drips: None         Objective     Visit Vitals  /77 (BP Location: Right arm, Patient Position: Lying)   Pulse 71   Temp 97.7 °F (36.5 °C) (Oral)   Resp 18   Ht 177 cm (69.69\")   Wt 109 kg (240 lb 3.2 oz)   SpO2 95%   BMI 34.78 kg/m²   Baseline weight 238 pounds    Intake/Output Summary (Last 24 hours) at 2025 0726  Last data filed at 2025 0326  Gross per 24 hour   Intake 720 ml   Output 1400 ml   Net -680 ml     Left pleural drain: 400 mL in 24 hours, serosanguineous, no airleak    Lab:     CBC:  Results from last 7 days   Lab Units 25  0506 25  0341 25  0406   WBC 10*3/mm3 8.70 6.23 7.78   HEMATOCRIT % 27.9* 26.6* 30.2*   PLATELETS 10*3/mm3 257 222 185          BMP:  Results from last 7 days   Lab Units 25  0506 25  0341 25  0406   SODIUM mmol/L 136 135* 133*   POTASSIUM mmol/L 3.6 3.5 3.8   CHLORIDE mmol/L 102 101 101   CO2 mmol/L 22.0 23.0 21.0*   GLUCOSE mg/dL 111* 103* 107*   BUN mg/dL 14.9 12.6 13.6   CREATININE mg/dL 0.78 0.80 0.84          COAG:  Results from last 7 days   Lab Units 25  2225   INR  0.96   APTT " seconds 38.4*       IMAGES:       Imaging Results (Last 24 Hours)       Procedure Component Value Units Date/Time    XR Chest 1 View - In process [[id number]] Resulted: 06/24/25 0431     Updated: 06/24/25 0431    This result has not been signed. Information might be incomplete.            CXR: Left chest tube in stable position with small left apical pneumothorax.  Bibasilar atelectasis.    Physical Exam:  General: Alert, oriented. No apparent distress.   Cardiovascular: Regular rate and rhythm without murmur, rubs, or gallops.    Pulmonary: Clear to auscultation bilaterally without wheezing, rubs, or rales.  Chest: Sternotomy incision clean, dry, and intact. Sternum stable. No clicks.  Left chest tube to 20 cm suction. No air leak. Fluid is serosanguineous  Abdomen: Soft, nondistended, and nontender.  Extremities: Warm, moves all extremities. No edema. Saphenectomy site clean, dry, and intact  Neurologic:  Grossly intact with no focal deficits.            Impression:  Aortic valve stenosis  Coronary artery disease  Paroxysmal atrial fibrillation  Hyperlipidemia  Hypertension      Plan:  Lasix 20 mg x 1 dose this morning  Replace potassium  Routine postcardiac surgery care  Encourage pulmonary toilet ambulation  Keep chest tube as output is too high for removal  Likely ready for discharge when chest tube output decreases  Discussed with patient and nursing      JUDE Urena  06/24/25  07:26 CDT

## 2025-06-24 NOTE — CASE MANAGEMENT/SOCIAL WORK
Continued Stay Note   [place]     Patient Name: Linden Montelongo  MRN: [id number]  Today's Date: 6/24/2025    Admit Date: 6/16/2025    Plan: Home   Discharge Plan       Row Name 06/24/25 0920       Plan    Plan Home    Plan Comments Chart reviewed; events noted.  Pt plans home; no dc needs identified.                   Discharge Codes    No documentation.                 Expected Discharge Date and Time       Expected Discharge Date Expected Discharge Time    Jun 24, 2025               GUEVARA SanchezW

## 2025-06-24 NOTE — THERAPY TREATMENT NOTE
Acute Care - Physical Therapy Treatment Note  [facility]     Patient Name: Linden Montelongo  : 1945  MRN: [id number]  Today's Date: 2025      Visit Dx:     ICD-10-CM ICD-9-CM   1. Impaired mobility [Z74.09]  Z74.09 799.89   2. Coronary artery disease involving native coronary artery of native heart without angina pectoris  I25.10 414.01   3. Aortic valve stenosis  I35.0 424.1     Patient Active Problem List   Diagnosis    Coronary artery disease involving native coronary artery of native heart without angina pectoris    Aortic valve stenosis    Nonrheumatic aortic (valve) stenosis     Past Medical History:   Diagnosis Date    A-fib     Hyperlipidemia     Hypertension     LUKE on CPAP     Sleep apnea      Past Surgical History:   Procedure Laterality Date    AORTIC VALVE REPAIR/REPLACEMENT N/A 2025    Procedure: AORTIC VALVE REPLACEMENT 23MM, CORONARY ARTERY BYPASS GRAFTING X2, LEFT INTERNAL MAMMARY ARTERY GRAFT, RIGHT LEG ENDOSCOPIC VEIN HARVEST,  MAZE WITH ENCOMPASS CLAMP, CRYOABLATION, LEFT ATRIAL APPENDAGE EXCLUSION 40MM, TRANSESOPHAGEAL ECHOCARDIOGRAM;  Surgeon: Andrew Mcneil MD;  Location:  [facility];  Service: Cardiothoracic;  Laterality: N/A;    APPENDECTOMY      ATRIAL APPENDAGE EXCLUSION LEFT WITH TRANSESOPHAGEAL ECHOCARDIOGRAM N/A 2025    Procedure: ATRIAL APPENDAGE EXCLUSION LEFT WITH TRANSESOPHAGEAL ECHOCARDIOGRAM;  Surgeon: Andrew Mcneil MD;  Location:  [facility];  Service: Cardiothoracic;  Laterality: N/A;    CORONARY ARTERY BYPASS GRAFT N/A 2025    Procedure: CORONARY ARTERY BYPASS GRAFTING;  Surgeon: Andrew Mcneil MD;  Location:  [facility];  Service: Cardiothoracic;  Laterality: N/A;    ESOPHAGUS SURGERY      MAZE PROCEDURE N/A 2025    Procedure: MAZE PROCEDURE WITH ENCOMPASS CLAMP;  Surgeon: Andrew Mcneil MD;  Location:  [facility];  Service: Cardiothoracic;  Laterality: N/A;    REPLACEMENT TOTAL KNEE Right     TOTAL HIP ARTHROPLASTY Right      PT Assessment (Last 12  Hours)       PT Evaluation and Treatment       Row Name 06/24/25 1050          Physical Therapy Time and Intention    Subjective Information no complaints  -KJ     Document Type therapy note (daily note)  -KJ     Mode of Treatment physical therapy  -KJ     Patient Effort good  -KJ       Row Name 06/24/25 1050          General Information    Existing Precautions/Restrictions fall;sternal  chest tube  -KJ       Row Name 06/24/25 1050          Pain    Pretreatment Pain Rating 3/10  -KJ     Posttreatment Pain Rating 3/10  -KJ     Pain Location flank  -KJ     Pain Side/Orientation right  -KJ       Row Name 06/24/25 1050          Bed Mobility    Comment, (Bed Mobility) chair  -KJ       Row Name 06/24/25 1050          Sit-Stand Transfer    Sit-Stand [place] (Transfers) standby assist  -KJ       Row Name 06/24/25 1050          Stand-Sit Transfer    Stand-Sit [place] (Transfers) contact guard  -KJ       Row Name 06/24/25 1050          Gait/Stairs (Locomotion)    [place] Level (Gait) contact guard  -KJ     Distance in Feet (Gait) 175  sitting rest 175' back to bed  -KJ     Deviations/Abnormal Patterns (Gait) gait speed decreased  -KJ       Row Name 06/24/25 1050          Motor Skills    Comments, Therapeutic Exercise cardiac protocol  -KJ       Row Name             Wound 06/16/25 0817 sternal Surgical    Wound - Properties Group Placement Date: 06/16/25  - Placement Time: 0817  -LH Present on Original Admission: N  -LH Location: sternal  -LH Primary Wound Type: Surgical  -LH Additional Comments: [facility]  -    Retired Wound - Properties Group Placement Date: 06/16/25  - Placement Time: 0817  -LH Present on Original Admission: N  -LH Location: sternal  -LH Additional Comments: [facility]    Retired Wound - Properties Group Placement Date: 06/16/25  - Placement Time: 0817  -LH Present on Original Admission: N  -LH Location: sternal  -LH Additional Comments[facility]  -    Retired Wound - Properties  Group Date first assessed: 06/16/25 - Time first assessed: 0817  -LH Present on Original Admission: N  -LH Location: sternal  -LH Additional Comments: [facility]  -LH      Row Name             Wound 06/16/25 0817 Right anterior knee Surgical    Wound - Properties Group Placement Date: 06/16/25  -LH Placement Time: 0817  -LH Present on Original Admission: N  -LH Side: Right  -LH Orientation: anterior  -LH Location: knee  -LH Primary Wound Type: Surgical  -LH Additional Comments: mastisol, steri strips, mepilex, ace  -LH    Retired Wound - Properties Group Placement Date: 06/16/25 - Placement Time: 0817  -LH Present on Original Admission: N  -LH Side: Right  -LH Orientation: anterior  -LH Location: knee  -LH Additional Comments: mastisol, steri strips, mepilex, ace  -LH    Retired Wound - Properties Group Placement Date: 06/16/25 - Placement Time: 0817  -LH Present on Original Admission: N  -LH Side: Right  -LH Orientation: anterior  -LH Location: knee  -LH Additional Comments: mastisol, steri strips, mepilex, ace  -LH    Retired Wound - Properties Group Date first assessed: 06/16/25 - Time first assessed: 0817  -LH Present on Original Admission: N  -LH Side: Right  -LH Location: knee  -LH Additional Comments: mastisol, steri strips, mepilex, ace  -LH      Row Name             Wound 06/16/25 0817 Right anterior groin Surgical    Wound - Properties Group Placement Date: 06/16/25 -LH Placement Time: 0817  -LH Side: Right  -LH Orientation: anterior  -LH Location: groin  -LH Primary Wound Type: Surgical  -LH Additional Comments: mastisol, steri strips, mepilex, ace  -LH    Retired Wound - Properties Group Placement Date: 06/16/25 -LH Placement Time: 0817  -LH Side: Right  -LH Orientation: anterior  -LH Location: groin  -LH Additional Comments: mastisol, steri strips, mepilex, ace  -LH    Retired Wound - Properties Group Placement Date: 06/16/25 -LH Placement Time: 0817  -LH Side: Right  -LH Orientation:  anterior  -LH Location: groin  -LH Additional Comments: mastisol, steri strips, mepilex, ace  -LH    Retired Wound - Properties Group Date first assessed: 06/16/25  - Time first assessed: 0817 - Side: Right  -LH Location: groin  -LH Additional Comments: mastisol, steri strips, mepilex, ace  -LH      Row Name 06/24/25 1050          Positioning and Restraints    Pre-Treatment Position sitting in chair/recliner  -KJ     Post Treatment Position chair  -KJ               User Key  (r) = Recorded By, (t) = Taken By, (c) = Cosigned By      Initials Name Provider Type    Maryann Lombardi, GLORIA Physical Therapist Assistant     Dania De La Rosa RN Registered Nurse                    Physical Therapy Education       Title: PT OT SLP Therapies (In Progress)       Topic: Physical Therapy (In Progress)       Point: Mobility training (In Progress)       Learning Progress Summary            Patient Acceptance, E,D, NR by KULWANT at 6/20/2025 1129    Comment: bed transfers    Acceptance, E, NR by GABRIELLA at 6/18/2025 1415    Comment: sternal prec, gait safety    Acceptance, E, VU,DU,NR by GABRIELLA at 6/18/2025 0800    Comment: benefits of activity, progression of PT, sternal prec    Acceptance, E, NR by SHAISTA at 6/17/2025 1438    Comment: continued sternal precautions, goal progression    Acceptance, E, VU by SHAISTA at 6/17/2025 1011    Comment: role of PT, sternal precvautions, progressive mobility and ambuakltion                      Point: Home exercise program (In Progress)       Learning Progress Summary            Patient Acceptance, E, NR by SHAISTA at 6/17/2025 1438    Comment: continued sternal precautions, goal progression    Acceptance, E, VU by SHAISTA at 6/17/2025 1011    Comment: role of PT, sternal precvautions, progressive mobility and ambuakltion                      Point: Body mechanics (In Progress)       Learning Progress Summary            Patient Acceptance, E, NR by SHAISTA at 6/17/2025 1438    Comment: continued sternal precautions, goal  progression    Acceptance, E, VU by SHAISTA at 6/17/2025 1011    Comment: role of PT, sternal precvautions, progressive mobility and ambuakltion                      Point: Precautions (Done)       Learning Progress Summary            Patient Acceptance, E, VU,DU,NR by GABRIELLA at 6/18/2025 0800    Comment: benefits of activity, progression of PT, sternal prec    Acceptance, E, NR by SHAISTA at 6/17/2025 1438    Comment: continued sternal precautions, goal progression    Acceptance, E, VU by SHAISTA at 6/17/2025 1011    Comment: role of PT, sternal precvautions, progressive mobility and ambuakltion                                      User Key       Initials Effective Dates Name Provider Type Discipline    GABRIELLA 02/03/23 -  Dereck Farias, PT DPT Physical Therapist PT    KULWANT 02/03/23 -  Vale Mack, PTA Physical Therapist Assistant PT    SHAISTA 08/15/24 -  Andrew Palomino, PT DPT Physical Therapist PT                  PT Recommendation and Plan             Time Calculation:    PT Charges       Row Name 06/24/25 1120             Time Calculation    Start Time 1050  -KJ      Stop Time 1113  -KJ      Time Calculation (min) 23 min  -KJ      PT Received On 06/24/25  -KJ      PT Goal Re-Cert Due Date 06/27/25  -KJ         Time Calculation- PT    Total Timed Code Minutes- PT 23 minute(s)  -KJ                User Key  (r) = Recorded By, (t) = Taken By, (c) = Cosigned By      Initials Name Provider Type    Maryann Lombardi, GLORIA Physical Therapist Assistant                  Therapy Charges for Today       Code Description Service Date Service Provider Modifiers Qty    96184240625 HC GAIT TRAINING EA 15 MIN 6/23/2025 Maryann Mazariegos, PTA GP 2    33667268702 HC GAIT TRAINING EA 15 MIN 6/23/2025 Maryann Mazariegos, PTA GP 2    90780571758 HC GAIT TRAINING EA 15 MIN 6/24/2025 Maryann Mazariegos, PTA GP 2            PT G-Codes  Outcome Measure Options: AM-PAC 6 Clicks Basic Mobility (PT)  AM-PAC 6 Clicks Score (PT): 18    Maryann Mazariegos  PTA  6/24/2025

## 2025-06-24 NOTE — PLAN OF CARE
Goal Outcome Evaluation:              Outcome Evaluation: Patient is alert and oriented x 4 . Reports 3/10 pain to right side  when breathing. Patient flipped into atrial fibrillation at 0821 this morning with  heart rate ranging from . He went back in to normal sinus rhythm at 0930. L chest tube remains in place. Out put for shift so far is 320.  Call light is within reach, safety maintained.

## 2025-06-24 NOTE — THERAPY TREATMENT NOTE
Acute Care - Physical Therapy Treatment Note  [facility]     Patient Name: Linden Montelongo  : 1945  MRN: [id number]  Today's Date: 2025      Visit Dx:     ICD-10-CM ICD-9-CM   1. Impaired mobility [Z74.09]  Z74.09 799.89   2. Coronary artery disease involving native coronary artery of native heart without angina pectoris  I25.10 414.01   3. Aortic valve stenosis  I35.0 424.1     Patient Active Problem List   Diagnosis    Coronary artery disease involving native coronary artery of native heart without angina pectoris    Aortic valve stenosis    Nonrheumatic aortic (valve) stenosis     Past Medical History:   Diagnosis Date    A-fib     Hyperlipidemia     Hypertension     LUKE on CPAP     Sleep apnea      Past Surgical History:   Procedure Laterality Date    AORTIC VALVE REPAIR/REPLACEMENT N/A 2025    Procedure: AORTIC VALVE REPLACEMENT 23MM, CORONARY ARTERY BYPASS GRAFTING X2, LEFT INTERNAL MAMMARY ARTERY GRAFT, RIGHT LEG ENDOSCOPIC VEIN HARVEST,  MAZE WITH ENCOMPASS CLAMP, CRYOABLATION, LEFT ATRIAL APPENDAGE EXCLUSION 40MM, TRANSESOPHAGEAL ECHOCARDIOGRAM;  Surgeon: Andrew Mcneil MD;  Location:  [facility];  Service: Cardiothoracic;  Laterality: N/A;    APPENDECTOMY      ATRIAL APPENDAGE EXCLUSION LEFT WITH TRANSESOPHAGEAL ECHOCARDIOGRAM N/A 2025    Procedure: ATRIAL APPENDAGE EXCLUSION LEFT WITH TRANSESOPHAGEAL ECHOCARDIOGRAM;  Surgeon: Andrew Mcneil MD;  Location:  [facility];  Service: Cardiothoracic;  Laterality: N/A;    CORONARY ARTERY BYPASS GRAFT N/A 2025    Procedure: CORONARY ARTERY BYPASS GRAFTING;  Surgeon: Andrew Mcneil MD;  Location:  [facility];  Service: Cardiothoracic;  Laterality: N/A;    ESOPHAGUS SURGERY      MAZE PROCEDURE N/A 2025    Procedure: MAZE PROCEDURE WITH ENCOMPASS CLAMP;  Surgeon: Andrew Mcneil MD;  Location:  [facility];  Service: Cardiothoracic;  Laterality: N/A;    REPLACEMENT TOTAL KNEE Right     TOTAL HIP ARTHROPLASTY Right      PT Assessment (Last 12  Hours)       PT Evaluation and Treatment       Row Name 06/24/25 1500 06/24/25 1050       Physical Therapy Time and Intention    Subjective Information no complaints  -KJ no complaints  -KJ    Document Type therapy note (daily note)  -KJ therapy note (daily note)  -KJ    Mode of Treatment physical therapy  -KJ physical therapy  -KJ    Patient Effort good  -KJ good  -KJ      Row Name 06/24/25 1500 06/24/25 1050       General Information    Existing Precautions/Restrictions fall;sternal  chest tube  -KJ fall;sternal  chest tube  -KJ      Row Name 06/24/25 1500 06/24/25 1050       Pain    Pretreatment Pain Rating 3/10  -KJ 3/10  -KJ    Posttreatment Pain Rating 3/10  -KJ 3/10  -KJ    Pain Location -- flank  -KJ    Pain Side/Orientation right  -KJ right  -KJ      Row Name 06/24/25 1050          Bed Mobility    Comment, (Bed Mobility) chair  -KJ       Row Name 06/24/25 1500 06/24/25 1050       Sit-Stand Transfer    Sit-Stand [place] (Transfers) standby assist  -KJ standby assist  -KJ      Row Name 06/24/25 1500 06/24/25 1050       Stand-Sit Transfer    Stand-Sit [place] (Transfers) contact guard  -KJ contact guard  -KJ      Row Name 06/24/25 1500 06/24/25 1050       Gait/Stairs (Locomotion)    [place] Level (Gait) contact guard  -KJ contact guard  -KJ    Distance in Feet (Gait) 175  sitting rest 175' back to room  -  sitting rest 175' back to bed  -KJ    Deviations/Abnormal Patterns (Gait) gait speed decreased  -KJ gait speed decreased  -KJ      Row Name 06/24/25 1050          Motor Skills    Comments, Therapeutic Exercise cardiac protocol  -KJ       Row Name             Wound 06/16/25 0817 sternal Surgical    Wound - Properties Group Placement Date: 06/16/25  [facility] Placement Time: 0817  - Present on Original Admission: N  -LH Location: sternal  -LH Primary Wound Type: Surgical  -LH Additional Comments: [facility]  -LH    Retired Wound - Properties Group Placement Date: 06/16/25  - Placement Time:  0817  -LH Present on Original Admission: N  -LH Location: sternal  -LH Additional Comments: [facility]  -LH    Retired Wound - Properties Group Placement Date: 06/16/25  -LH Placement Time: 0817  -LH Present on Original Admission: N  -LH Location: sternal  -LH Additional Comments: [facility]  -LH    Retired Wound - Properties Group Date first assessed: 06/16/25  -LH Time first assessed: 0817  -LH Present on Original Admission: N  -LH Location: sternal  -LH Additional Comments: [facility]  -LH      Row Name             Wound 06/16/25 0817 Right anterior knee Surgical    Wound - Properties Group Placement Date: 06/16/25  -LH Placement Time: 0817  -LH Present on Original Admission: N  -LH Side: Right  -LH Orientation: anterior  -LH Location: knee  -LH Primary Wound Type: Surgical  -LH Additional Comments: mastisol, steri strips, mepilex, ace  -LH    Retired Wound - Properties Group Placement Date: 06/16/25 -LH Placement Time: 0817  -LH Present on Original Admission: N  -LH Side: Right  -LH Orientation: anterior  -LH Location: knee  -LH Additional Comments: mastisol, steri strips, mepilex, ace  -LH    Retired Wound - Properties Group Placement Date: 06/16/25 -LH Placement Time: 0817  -LH Present on Original Admission: N  -LH Side: Right  -LH Orientation: anterior  -LH Location: knee  -LH Additional Comments: mastisol, steri strips, mepilex, ace  -LH    Retired Wound - Properties Group Date first assessed: 06/16/25 -LH Time first assessed: 0817  -LH Present on Original Admission: N  -LH Side: Right  -LH Location: knee  -LH Additional Comments: mastisol, steri strips, mepilex, ace  -LH      Row Name             Wound 06/16/25 0817 Right anterior groin Surgical    Wound - Properties Group Placement Date: 06/16/25 -LH Placement Time: 0817  -LH Side: Right  -LH Orientation: anterior  -LH Location: groin  -LH Primary Wound Type: Surgical  -LH Additional Comments: mastisol, steri strips, mepilex, ace  -LH    Retired Wound -  Properties Group Placement Date: 06/16/25  - Placement Time: 0817 -LH Side: Right  -LH Orientation: anterior  -LH Location: groin  -LH Additional Comments: mastisol, steri strips, mepilex, ace  -LH    Retired Wound - Properties Group Placement Date: 06/16/25  - Placement Time: 0817 -LH Side: Right  -LH Orientation: anterior  -LH Location: groin  -LH Additional Comments: mastisol, steri strips, mepilex, ace  -LH    Retired Wound - Properties Group Date first assessed: 06/16/25  - Time first assessed: 0817 -LH Side: Right  -LH Location: groin  -LH Additional Comments: mastisol, steri strips, mepilex, ace  -LH      Row Name 06/24/25 1500 06/24/25 1050       Positioning and Restraints    Pre-Treatment Position sitting in chair/recliner  -KJ sitting in chair/recliner  -KJ    Post Treatment Position chair  -KJ chair  -KJ              User Key  (r) = Recorded By, (t) = Taken By, (c) = Cosigned By      Initials Name Provider Type    Maryann Lombardi, PTA Physical Therapist Assistant     Dania De La Rosa RN Registered Nurse                    Physical Therapy Education       Title: PT OT SLP Therapies (In Progress)       Topic: Physical Therapy (In Progress)       Point: Mobility training (In Progress)       Learning Progress Summary            Patient Acceptance, E,D, NR by KULWANT at 6/20/2025 1129    Comment: bed transfers    Acceptance, E, NR by GABRIELLA at 6/18/2025 1415    Comment: sternal prec, gait safety    Acceptance, E, VU,DU,NR by GABRIELLA at 6/18/2025 0800    Comment: benefits of activity, progression of PT, sternal prec    Acceptance, E, NR by SHAISTA at 6/17/2025 1438    Comment: continued sternal precautions, goal progression    Acceptance, E, VU by SHAISTA at 6/17/2025 1011    Comment: role of PT, sternal precvautions, progressive mobility and ambuakltion                      Point: Home exercise program (In Progress)       Learning Progress Summary            Patient Acceptance, E, NR by SHAISTA at 6/17/2025 1438    Comment:  continued sternal precautions, goal progression    Acceptance, E, VU by SHAISTA at 6/17/2025 1011    Comment: role of PT, sternal precvautions, progressive mobility and ambuakltion                      Point: Body mechanics (In Progress)       Learning Progress Summary            Patient Acceptance, E, NR by SHAISTA at 6/17/2025 1438    Comment: continued sternal precautions, goal progression    Acceptance, E, VU by SHAISTA at 6/17/2025 1011    Comment: role of PT, sternal precvautions, progressive mobility and ambuakltion                      Point: Precautions (Done)       Learning Progress Summary            Patient Acceptance, E, VU,DU,NR by GABRIELAL at 6/18/2025 0800    Comment: benefits of activity, progression of PT, sternal prec    Acceptance, E, NR by SHAISTA at 6/17/2025 1438    Comment: continued sternal precautions, goal progression    Acceptance, E, VU by SHAISTA at 6/17/2025 1011    Comment: role of PT, sternal precvautions, progressive mobility and ambuakltion                                      User Key       Initials Effective Dates Name Provider Type Discipline    GABRIELLA 02/03/23 -  Dereck Farias, PT DPT Physical Therapist PT    KULWANT 02/03/23 -  Vale Mack, PTA Physical Therapist Assistant PT     08/15/24 -  Andrew Palomino, PT DPT Physical Therapist PT                  PT Recommendation and Plan             Time Calculation:    PT Charges       Row Name 06/24/25 1608 06/24/25 1120          Time Calculation    Start Time 1500  -KJ 1050  -KJ     Stop Time 1515  -KJ 1113  -KJ     Time Calculation (min) 15 min  -KJ 23 min  -KJ     PT Received On -- 06/24/25  -KJ     PT Goal Re-Cert Due Date -- 06/27/25  -KJ        Time Calculation- PT    Total Timed Code Minutes- PT -- 23 minute(s)  -KJ               User Key  (r) = Recorded By, (t) = Taken By, (c) = Cosigned By      Initials Name Provider Type    Maryann Lombardi, PTA Physical Therapist Assistant                  Therapy Charges for Today       Code Description Service  Date Service Provider Modifiers Qty    65953623738 HC GAIT TRAINING EA 15 MIN 6/23/2025 Maryann Mazariegos, PTA GP 2    65151491539 HC GAIT TRAINING EA 15 MIN 6/23/2025 Maryann Mazariegos, PTA GP 2    61104620085 HC GAIT TRAINING EA 15 MIN 6/24/2025 Maryann Mazariegos, PTA GP 2    74243277216 HC GAIT TRAINING EA 15 MIN 6/24/2025 Maryann Mazariegos, PTA  1            PT G-Codes  Outcome Measure Options: AM-PAC 6 Clicks Basic Mobility (PT)  AM-PAC 6 Clicks Score (PT): 18    Maryann Mazariegos, GLORIA  6/24/2025

## 2025-06-24 NOTE — PLAN OF CARE
Goal Outcome Evaluation:              Outcome Evaluation: A/Ox4; VSS; pain managed effectively with ordered medications; standby assist with ambulation/transfers; safety plan remains in place; L chest tube patent/draining; continue with POC.

## 2025-06-25 ENCOUNTER — APPOINTMENT (OUTPATIENT)
Dept: CT IMAGING | Facility: HOSPITAL | Age: 80
End: 2025-06-25
Payer: MEDICARE

## 2025-06-25 ENCOUNTER — APPOINTMENT (OUTPATIENT)
Dept: GENERAL RADIOLOGY | Facility: HOSPITAL | Age: 80
End: 2025-06-25
Payer: MEDICARE

## 2025-06-25 LAB
ANION GAP SERPL CALCULATED.3IONS-SCNC: 13 MMOL/L (ref 5–15)
BUN SERPL-MCNC: 14.2 MG/DL (ref 8–23)
BUN/CREAT SERPL: 20.9 (ref 7–25)
CALCIUM SPEC-SCNC: 8.2 MG/DL (ref 8.6–10.5)
CHLORIDE SERPL-SCNC: 102 MMOL/L (ref 98–107)
CO2 SERPL-SCNC: 19 MMOL/L (ref 22–29)
CREAT SERPL-MCNC: 0.68 MG/DL (ref 0.76–1.27)
DEPRECATED RDW RBC AUTO: 58.5 FL (ref 37–54)
EGFRCR SERPLBLD CKD-EPI 2021: 94 ML/MIN/1.73
ERYTHROCYTE [DISTWIDTH] IN BLOOD BY AUTOMATED COUNT: 16.9 % (ref 12.3–15.4)
GLUCOSE BLDC GLUCOMTR-MCNC: 107 MG/DL (ref 70–130)
GLUCOSE BLDC GLUCOMTR-MCNC: 109 MG/DL (ref 70–130)
GLUCOSE BLDC GLUCOMTR-MCNC: 110 MG/DL (ref 70–130)
GLUCOSE BLDC GLUCOMTR-MCNC: 110 MG/DL (ref 70–130)
GLUCOSE SERPL-MCNC: 104 MG/DL (ref 65–99)
HCT VFR BLD AUTO: 30.9 % (ref 37.5–51)
HGB BLD-MCNC: 9.7 G/DL (ref 13–17.7)
MCH RBC QN AUTO: 30.3 PG (ref 26.6–33)
MCHC RBC AUTO-ENTMCNC: 31.4 G/DL (ref 31.5–35.7)
MCV RBC AUTO: 96.6 FL (ref 79–97)
PLATELET # BLD AUTO: 280 10*3/MM3 (ref 140–450)
PMV BLD AUTO: 9.7 FL (ref 6–12)
POTASSIUM SERPL-SCNC: 4.5 MMOL/L (ref 3.5–5.2)
RBC # BLD AUTO: 3.2 10*6/MM3 (ref 4.14–5.8)
SODIUM SERPL-SCNC: 134 MMOL/L (ref 136–145)
WBC NRBC COR # BLD AUTO: 6.71 10*3/MM3 (ref 3.4–10.8)

## 2025-06-25 PROCEDURE — 97116 GAIT TRAINING THERAPY: CPT

## 2025-06-25 PROCEDURE — 80048 BASIC METABOLIC PNL TOTAL CA: CPT | Performed by: NURSE PRACTITIONER

## 2025-06-25 PROCEDURE — 85027 COMPLETE CBC AUTOMATED: CPT | Performed by: NURSE PRACTITIONER

## 2025-06-25 PROCEDURE — 71250 CT THORAX DX C-: CPT

## 2025-06-25 PROCEDURE — 71045 X-RAY EXAM CHEST 1 VIEW: CPT

## 2025-06-25 PROCEDURE — 25010000002 ENOXAPARIN PER 10 MG: Performed by: NURSE PRACTITIONER

## 2025-06-25 PROCEDURE — 25010000002 FUROSEMIDE PER 20 MG: Performed by: NURSE PRACTITIONER

## 2025-06-25 PROCEDURE — 82948 REAGENT STRIP/BLOOD GLUCOSE: CPT

## 2025-06-25 RX ORDER — POTASSIUM CHLORIDE 1500 MG/1
20 TABLET, EXTENDED RELEASE ORAL ONCE
Status: COMPLETED | OUTPATIENT
Start: 2025-06-25 | End: 2025-06-25

## 2025-06-25 RX ORDER — FUROSEMIDE 10 MG/ML
20 INJECTION INTRAMUSCULAR; INTRAVENOUS ONCE
Status: COMPLETED | OUTPATIENT
Start: 2025-06-25 | End: 2025-06-25

## 2025-06-25 RX ADMIN — AMIODARONE HYDROCHLORIDE 200 MG: 200 TABLET ORAL at 18:00

## 2025-06-25 RX ADMIN — ACETAMINOPHEN 650 MG: 325 TABLET ORAL at 18:00

## 2025-06-25 RX ADMIN — ASPIRIN 81 MG: 81 TABLET, COATED ORAL at 08:52

## 2025-06-25 RX ADMIN — FUROSEMIDE 20 MG: 10 INJECTION, SOLUTION INTRAMUSCULAR; INTRAVENOUS at 08:52

## 2025-06-25 RX ADMIN — ATORVASTATIN CALCIUM 20 MG: 10 TABLET, FILM COATED ORAL at 20:05

## 2025-06-25 RX ADMIN — ENOXAPARIN SODIUM 40 MG: 100 INJECTION SUBCUTANEOUS at 08:52

## 2025-06-25 RX ADMIN — ACETAMINOPHEN 650 MG: 325 TABLET ORAL at 06:03

## 2025-06-25 RX ADMIN — ACETAMINOPHEN 650 MG: 325 TABLET ORAL at 00:08

## 2025-06-25 RX ADMIN — POTASSIUM CHLORIDE 20 MEQ: 1500 TABLET, EXTENDED RELEASE ORAL at 08:52

## 2025-06-25 RX ADMIN — BISACODYL 10 MG: 5 TABLET, COATED ORAL at 20:05

## 2025-06-25 RX ADMIN — Medication 10 MG: at 20:05

## 2025-06-25 RX ADMIN — ACETAMINOPHEN 650 MG: 325 TABLET ORAL at 11:59

## 2025-06-25 RX ADMIN — METOPROLOL TARTRATE 25 MG: 25 TABLET, FILM COATED ORAL at 08:52

## 2025-06-25 RX ADMIN — ACETAMINOPHEN 650 MG: 325 TABLET ORAL at 23:24

## 2025-06-25 RX ADMIN — METOPROLOL TARTRATE 25 MG: 25 TABLET, FILM COATED ORAL at 20:05

## 2025-06-25 RX ADMIN — AMIODARONE HYDROCHLORIDE 200 MG: 200 TABLET ORAL at 08:52

## 2025-06-25 NOTE — PLAN OF CARE
Goal Outcome Evaluation:  Plan of Care Reviewed With: patient        Progress: improving  Outcome Evaluation: Pt A/Ox4; denies pain this shift; L chest tube remains in place; dressings C/D/I; patient unable to stay asleep this shift after administration of melatonin; VSS; fall/safety precautions maintained; up with assist x 1; call light within reach; cont POC.

## 2025-06-25 NOTE — PROGRESS NOTES
"Patient name: Linden Montelongo  Patient : 1945  VISIT # [id number]  MR #[id number]    Procedure:Procedure(s):  AORTIC VALVE REPLACEMENT 23MM, CORONARY ARTERY BYPASS GRAFTING X2, LEFT INTERNAL MAMMARY ARTERY GRAFT, RIGHT LEG ENDOSCOPIC VEIN HARVEST,  MAZE WITH ENCOMPASS CLAMP, CRYOABLATION, LEFT ATRIAL APPENDAGE EXCLUSION 40MM, TRANSESOPHAGEAL ECHOCARDIOGRAM  CORONARY ARTERY BYPASS GRAFTING  MAZE PROCEDURE WITH ENCOMPASS CLAMP  ATRIAL APPENDAGE EXCLUSION LEFT WITH TRANSESOPHAGEAL ECHOCARDIOGRAM  Procedure Date:2025  POD:9 Days Post-Op    Subjective     On room air.  Weight is down 2 pounds and he is at his baseline weight.  Having bowel movements.  Walking 175 feet with physical therapy.    Telemetry: Sinus rhythm 60s  IV drips: None         Objective     Visit Vitals  /66 (BP Location: Right arm, Patient Position: Sitting)   Pulse 66   Temp 97.7 °F (36.5 °C) (Oral)   Resp 18   Ht 177 cm (69.69\")   Wt 108 kg (238 lb)   SpO2 96%   BMI 34.46 kg/m²   Baseline weight 238 pounds    Intake/Output Summary (Last 24 hours) at 2025 0811  Last data filed at 2025 0603  Gross per 24 hour   Intake 960 ml   Output 1700 ml   Net -740 ml     Left pleural drain: 520 mL in 24 hours, serosanguineous, no airleak    Lab:     CBC:  Results from last 7 days   Lab Units 25  0534 25  0506 25  0341   WBC 10*3/mm3 6.71 8.70 6.23   HEMATOCRIT % 30.9* 27.9* 26.6*   PLATELETS 10*3/mm3 280 257 222          BMP:  Results from last 7 days   Lab Units 25  0534 25  0506 25  0341   SODIUM mmol/L 134* 136 135*   POTASSIUM mmol/L 4.5 3.6 3.5   CHLORIDE mmol/L 102 102 101   CO2 mmol/L 19.0* 22.0 23.0   GLUCOSE mg/dL 104* 111* 103*   BUN mg/dL 14.2 14.9 12.6   CREATININE mg/dL 0.68* 0.78 0.80          COAG:  Results from last 7 days   Lab Units 25  2225   INR  0.96   APTT seconds 38.4*       IMAGES:       Imaging Results (Last 24 Hours)       Procedure Component Value Units Date/Time    XR " Chest 1 View [[id number]] Collected: 06/25/25 0724     Updated: 06/25/25 0728    Narrative:      EXAMINATION: XR CHEST 1 VW-  6/25/2025 7:24 AM     HISTORY: Postop cardiac surgery.     FINDINGS: Today's exam is compared to previous study of 1 day earlier.  There is persistent mild pulmonary venous hypertension with  redistribution and widening of the vascular pedicle. Small effusions are  present. No free air beneath the diaphragms. There is some increased  lucency projecting over the left lung apex but without a discrete  appreciable pneumothorax.       Impression:      1. Persistent mild pulmonary vascular congestion. Small effusions  present.     This report was signed and finalized on 6/25/2025 7:25 AM by Dr. Florencio Parker MD.             CXR: Left chest tube in stable position with small pleural effusions.  Bibasilar atelectasis.  Stable exam    Physical Exam:  General: Alert, oriented. No apparent distress.   Cardiovascular: Regular rate and rhythm without murmur, rubs, or gallops.    Pulmonary: Clear to auscultation bilaterally without wheezing, rubs, or rales.  Chest: Sternotomy incision clean, dry, and intact. Sternum stable. No clicks.  Left chest tube to 20 cm suction. No air leak. Fluid is serosanguineous  Abdomen: Soft, nondistended, and nontender.  Extremities: Warm, moves all extremities. No edema. Saphenectomy site clean, dry, and intact  Neurologic:  Grossly intact with no focal deficits.            Impression:  Aortic valve stenosis  Coronary artery disease  Paroxysmal atrial fibrillation  Hyperlipidemia  Hypertension      Plan:  Lasix 20 mg IV x 1 dose this morning  Obtain CT chest without contrast today due to persistent chest tube output  Routine postcardiac surgery care  Encourage pulmonary toilet ambulation  Keep chest tube as output is too high for removal  Likely ready for discharge when chest tube output decreases  Discussed with patient and nursing      Maryann Chavez  JUDE  06/25/25  08:11 CDT

## 2025-06-25 NOTE — PLAN OF CARE
Goal Outcome Evaluation:              Outcome Evaluation: Patient is alert and oriented x 4.  L chest tube remains in place. A fib this morning at -0928, back in to normal sinus at 1051 am.   Atrium replaced due to tipping for accurate documentation of out put. Minimal complaints of pain today. Expresses desire to go home but understands the need to stay due to high out put from chest tube.

## 2025-06-25 NOTE — THERAPY TREATMENT NOTE
Acute Care - Physical Therapy Treatment Note  [facility]     Patient Name: Linden Montelongo  : 1945  MRN: [id number]  Today's Date: 2025      Visit Dx:     ICD-10-CM ICD-9-CM   1. Impaired mobility [Z74.09]  Z74.09 799.89   2. Coronary artery disease involving native coronary artery of native heart without angina pectoris  I25.10 414.01   3. Aortic valve stenosis  I35.0 424.1     Patient Active Problem List   Diagnosis    Coronary artery disease involving native coronary artery of native heart without angina pectoris    Aortic valve stenosis    Nonrheumatic aortic (valve) stenosis     Past Medical History:   Diagnosis Date    A-fib     Hyperlipidemia     Hypertension     LUKE on CPAP     Sleep apnea      Past Surgical History:   Procedure Laterality Date    AORTIC VALVE REPAIR/REPLACEMENT N/A 2025    Procedure: AORTIC VALVE REPLACEMENT 23MM, CORONARY ARTERY BYPASS GRAFTING X2, LEFT INTERNAL MAMMARY ARTERY GRAFT, RIGHT LEG ENDOSCOPIC VEIN HARVEST,  MAZE WITH ENCOMPASS CLAMP, CRYOABLATION, LEFT ATRIAL APPENDAGE EXCLUSION 40MM, TRANSESOPHAGEAL ECHOCARDIOGRAM;  Surgeon: Andrew Mcneil MD;  Location:  [facility];  Service: Cardiothoracic;  Laterality: N/A;    APPENDECTOMY      ATRIAL APPENDAGE EXCLUSION LEFT WITH TRANSESOPHAGEAL ECHOCARDIOGRAM N/A 2025    Procedure: ATRIAL APPENDAGE EXCLUSION LEFT WITH TRANSESOPHAGEAL ECHOCARDIOGRAM;  Surgeon: Andrew Mcneil MD;  Location:  [facility];  Service: Cardiothoracic;  Laterality: N/A;    CORONARY ARTERY BYPASS GRAFT N/A 2025    Procedure: CORONARY ARTERY BYPASS GRAFTING;  Surgeon: Andrew Mcneil MD;  Location:  [facility];  Service: Cardiothoracic;  Laterality: N/A;    ESOPHAGUS SURGERY      MAZE PROCEDURE N/A 2025    Procedure: MAZE PROCEDURE WITH ENCOMPASS CLAMP;  Surgeon: Andrew Mcneil MD;  Location:  [facility];  Service: Cardiothoracic;  Laterality: N/A;    REPLACEMENT TOTAL KNEE Right     TOTAL HIP ARTHROPLASTY Right      PT Assessment (Last 12  Hours)       PT Evaluation and Treatment       Row Name 06/25/25 143 06/25/25 1115       Physical Therapy Time and Intention    Subjective Information complains of;dyspnea  -AB complains of;fatigue  -KJ    Document Type therapy note (daily note)  -AB therapy note (daily note)  -KJ    Mode of Treatment physical therapy  -AB physical therapy  -KJ    Patient Effort -- good  -KJ      Row Name 06/25/25 1436 06/25/25 1115       General Information    Existing Precautions/Restrictions fall;sternal  chest tube  -AB fall;sternal  chest tube  -KJ      Row Name 06/25/25 1436 06/25/25 1115       Pain    Pretreatment Pain Rating 0/10 - no pain  -AB 0/10 - no pain  -KJ    Posttreatment Pain Rating 0/10 - no pain  -AB 0/10 - no pain  -KJ      Row Name 06/25/25 1436 06/25/25 1115       Bed Mobility    Comment, (Bed Mobility) up in chair  -AB chair  -KJ      Row Name 06/25/25 143 06/25/25 1115       Sit-Stand Transfer    Sit-Stand Palmer (Transfers) supervision;verbal cues  -AB supervision;verbal cues  -KJ      Row Name 06/25/25 1436 06/25/25 1115       Stand-Sit Transfer    Stand-Sit Palmer (Transfers) supervision  -AB supervision  -KJ      Row Name 06/25/25 143 06/25/25 1115       Gait/Stairs (Locomotion)    Palmer Level (Gait) contact guard;standby assist  -AB contact guard  -KJ    Distance in Feet (Gait) 200  x2 with sitting rest  -  -KJ    Deviations/Abnormal Patterns (Gait) gait speed decreased  -AB gait speed decreased  -KJ      Row Name 06/25/25 1436 06/25/25 1115       Motor Skills    Comments, Therapeutic Exercise Cardiac protocol  -AB cardiac protocol  -KJ      Row Name             Wound 06/16/25 0817 sternal Surgical    Wound - Properties Group Placement Date: 06/16/25  - Placement Time: 0817  -LH Present on Original Admission: N  -LH Location: sternal  -LH Primary Wound Type: Surgical  -LH Additional Comments: Prevena  -LH    Retired Wound - Properties Group Placement Date: 06/16/25  -  Placement Time: 0817  -LH Present on Original Admission: N  -LH Location: sternal  -LH Additional Comments: Prevena  -LH    Retired Wound - Properties Group Placement Date: 06/16/25  -LH Placement Time: 0817  -LH Present on Original Admission: N  -LH Location: sternal  -LH Additional Comments: Prevena  -LH    Retired Wound - Properties Group Date first assessed: 06/16/25  - Time first assessed: 0817 -LH Present on Original Admission: N  -LH Location: sternal  -LH Additional Comments: Prevena  -LH      Row Name             Wound 06/16/25 0817 Right anterior knee Surgical    Wound - Properties Group Placement Date: 06/16/25  - Placement Time: 0817  -LH Present on Original Admission: N  -LH Side: Right  -LH Orientation: anterior  -LH Location: knee  -LH Primary Wound Type: Surgical  -LH Additional Comments: mastisol, steri strips, mepilex, ace  -LH    Retired Wound - Properties Group Placement Date: 06/16/25 - Placement Time: 0817 -LH Present on Original Admission: N  -LH Side: Right  -LH Orientation: anterior  -LH Location: knee  -LH Additional Comments: mastisol, steri strips, mepilex, ace  -LH    Retired Wound - Properties Group Placement Date: 06/16/25 - Placement Time: 0817 -LH Present on Original Admission: N  -LH Side: Right  -LH Orientation: anterior  -LH Location: knee  -LH Additional Comments: mastisol, steri strips, mepilex, ace  -LH    Retired Wound - Properties Group Date first assessed: 06/16/25 - Time first assessed: 0817 -LH Present on Original Admission: N  -LH Side: Right  -LH Location: knee  -LH Additional Comments: mastisol, steri strips, mepilex, ace  -LH      Row Name             Wound 06/16/25 0817 Right anterior groin Surgical    Wound - Properties Group Placement Date: 06/16/25 -LH Placement Time: 0817 -LH Side: Right  -LH Orientation: anterior  -LH Location: groin  -LH Primary Wound Type: Surgical  -LH Additional Comments: mastisol, steri strips, mepilex, ace  -LH    Retired  Wound - Properties Group Placement Date: 06/16/25  - Placement Time: 0817 -LH Side: Right  -LH Orientation: anterior  -LH Location: groin  -LH Additional Comments: mastisol, steri strips, mepilex, ace  -LH    Retired Wound - Properties Group Placement Date: 06/16/25  - Placement Time: 0817 -LH Side: Right  -LH Orientation: anterior  -LH Location: groin  -LH Additional Comments: mastisol, steri strips, mepilex, ace  -LH    Retired Wound - Properties Group Date first assessed: 06/16/25  - Time first assessed: 0817 -LH Side: Right  -LH Location: groin  -LH Additional Comments: mastisol, steri strips, mepilex, ace  -LH      Row Name 06/25/25 1436 06/25/25 1115       Positioning and Restraints    Pre-Treatment Position sitting in chair/recliner  -AB sitting in chair/recliner  -KJ    Post Treatment Position chair  -AB chair  -KJ    In Chair reclined;sitting;call light within reach  -AB --              User Key  (r) = Recorded By, (t) = Taken By, (c) = Cosigned By      Initials Name Provider Type    AB July Griffin, PTA Physical Therapist Assistant    Maryann Lombardi, GLORIA Physical Therapist Assistant    Dania Ge, RN Registered Nurse                    Physical Therapy Education       Title: PT OT SLP Therapies (In Progress)       Topic: Physical Therapy (In Progress)       Point: Mobility training (In Progress)       Learning Progress Summary            Patient Acceptance, E,D, NR by KULWANT at 6/20/2025 1129    Comment: bed transfers    Acceptance, E, NR by GABRIELLA at 6/18/2025 1415    Comment: sternal prec, gait safety    Acceptance, E, VU,DU,NR by GABRIELLA at 6/18/2025 0800    Comment: benefits of activity, progression of PT, sternal prec    Acceptance, E, NR by SHAISTA at 6/17/2025 1438    Comment: continued sternal precautions, goal progression    Acceptance, E, VU by SHAISTA at 6/17/2025 1011    Comment: role of PT, sternal precvautions, progressive mobility and ambuakltion                      Point: Home exercise  program (In Progress)       Learning Progress Summary            Patient Acceptance, E, NR by SHAISTA at 6/17/2025 1438    Comment: continued sternal precautions, goal progression    Acceptance, E, VU by SHAISTA at 6/17/2025 1011    Comment: role of PT, sternal precvautions, progressive mobility and ambuakltion                      Point: Body mechanics (In Progress)       Learning Progress Summary            Patient Acceptance, E, NR by SHAISTA at 6/17/2025 1438    Comment: continued sternal precautions, goal progression    Acceptance, E, VU by SHAISTA at 6/17/2025 1011    Comment: role of PT, sternal precvautions, progressive mobility and ambuakltion                      Point: Precautions (Done)       Learning Progress Summary            Patient Acceptance, E, VU,DU,NR by GABRIELLA at 6/18/2025 0800    Comment: benefits of activity, progression of PT, sternal prec    Acceptance, E, NR by SHAISTA at 6/17/2025 1438    Comment: continued sternal precautions, goal progression    Acceptance, E, VU by SHAISTA at 6/17/2025 1011    Comment: role of PT, sternal precvautions, progressive mobility and ambuakltion                                      User Key       Initials Effective Dates Name Provider Type Discipline    GABRIELLA 02/03/23 -  Dereck Farias, PT DPT Physical Therapist PT    KULWANT 02/03/23 -  Vale Mack, PTA Physical Therapist Assistant PT     08/15/24 -  Andrew Palomino, TANGELA DPT Physical Therapist PT                  PT Recommendation and Plan             Time Calculation:    PT Charges       Row Name 06/25/25 1436 06/25/25 1140          Time Calculation    Start Time 1436  -AB 1115  -KJ     Stop Time 1454  -AB 1140  -KJ     Time Calculation (min) 18 min  -AB 25 min  -KJ     PT Received On 06/25/25  -AB 06/25/25  -KJ     PT Goal Re-Cert Due Date -- 06/27/25  -KJ        Time Calculation- PT    Total Timed Code Minutes- PT 18 minute(s)  -AB 25 minute(s)  -KJ               User Key  (r) = Recorded By, (t) = Taken By, (c) = Cosigned By      Initials  Name Provider Type    AB July Griffin, PTA Physical Therapist Assistant    Maryann Lombardi, PTA Physical Therapist Assistant                      PT G-Codes  Outcome Measure Options: AM-PAC 6 Clicks Basic Mobility (PT)  AM-PAC 6 Clicks Score (PT): 18    July Griffin PTA  6/25/2025

## 2025-06-25 NOTE — THERAPY TREATMENT NOTE
Acute Care - Physical Therapy Treatment Note  [facility]     Patient Name: Linden Montelongo  : 1945  MRN: [id number]  Today's Date: 2025      Visit Dx:     ICD-10-CM ICD-9-CM   1. Impaired mobility [Z74.09]  Z74.09 799.89   2. Coronary artery disease involving native coronary artery of native heart without angina pectoris  I25.10 414.01   3. Aortic valve stenosis  I35.0 424.1     Patient Active Problem List   Diagnosis    Coronary artery disease involving native coronary artery of native heart without angina pectoris    Aortic valve stenosis    Nonrheumatic aortic (valve) stenosis     Past Medical History:   Diagnosis Date    A-fib     Hyperlipidemia     Hypertension     LUKE on CPAP     Sleep apnea      Past Surgical History:   Procedure Laterality Date    AORTIC VALVE REPAIR/REPLACEMENT N/A 2025    Procedure: AORTIC VALVE REPLACEMENT 23MM, CORONARY ARTERY BYPASS GRAFTING X2, LEFT INTERNAL MAMMARY ARTERY GRAFT, RIGHT LEG ENDOSCOPIC VEIN HARVEST,  MAZE WITH ENCOMPASS CLAMP, CRYOABLATION, LEFT ATRIAL APPENDAGE EXCLUSION 40MM, TRANSESOPHAGEAL ECHOCARDIOGRAM;  Surgeon: Andrew Mcneil MD;  Location:  [facility];  Service: Cardiothoracic;  Laterality: N/A;    APPENDECTOMY      ATRIAL APPENDAGE EXCLUSION LEFT WITH TRANSESOPHAGEAL ECHOCARDIOGRAM N/A 2025    Procedure: ATRIAL APPENDAGE EXCLUSION LEFT WITH TRANSESOPHAGEAL ECHOCARDIOGRAM;  Surgeon: Andrew Mcneil MD;  Location:  [facility];  Service: Cardiothoracic;  Laterality: N/A;    CORONARY ARTERY BYPASS GRAFT N/A 2025    Procedure: CORONARY ARTERY BYPASS GRAFTING;  Surgeon: Andrew Mcneil MD;  Location:  [facility];  Service: Cardiothoracic;  Laterality: N/A;    ESOPHAGUS SURGERY      MAZE PROCEDURE N/A 2025    Procedure: MAZE PROCEDURE WITH ENCOMPASS CLAMP;  Surgeon: Andrew Mcneil MD;  Location:  [facility];  Service: Cardiothoracic;  Laterality: N/A;    REPLACEMENT TOTAL KNEE Right     TOTAL HIP ARTHROPLASTY Right      PT Assessment (Last 12  Hours)       PT Evaluation and Treatment       Row Name 06/25/25 1115          Physical Therapy Time and Intention    Subjective Information complains of;fatigue  -KJ     Document Type therapy note (daily note)  -KJ     Mode of Treatment physical therapy  -KJ     Patient Effort good  -KJ       Row Name 06/25/25 1115          General Information    Existing Precautions/Restrictions fall;sternal  chest tube  -KJ       Row Name 06/25/25 1115          Pain    Pretreatment Pain Rating 0/10 - no pain  -KJ     Posttreatment Pain Rating 0/10 - no pain  -KJ       Row Name 06/25/25 1115          Bed Mobility    Comment, (Bed Mobility) chair  -KJ       Row Name 06/25/25 1115          Sit-Stand Transfer    Sit-Stand [place] (Transfers) supervision;verbal cues  -KJ       Row Name 06/25/25 1115          Stand-Sit Transfer    Stand-Sit [place] (Transfers) supervision  -KJ       Row Name 06/25/25 1115          Gait/Stairs (Locomotion)    [place] Level (Gait) contact guard  -KJ     Distance in Feet (Gait) 200  -KJ     Deviations/Abnormal Patterns (Gait) gait speed decreased  -KJ       Row Name 06/25/25 1115          Motor Skills    Comments, Therapeutic Exercise cardiac protocol  -KJ       Row Name             Wound 06/16/25 0817 sternal Surgical    Wound - Properties Group Placement Date: 06/16/25  - Placement Time: 0817  -LH Present on Original Admission: N  -LH Location: sternal  -LH Primary Wound Type: Surgical  -LH Additional Comments: [facility]  -    Retired Wound - Properties Group Placement Date: 06/16/25  - Placement Time: 0817  -LH Present on Original Admission: N  -LH Location: sternal  -LH Additional Comments: [facility]  -    Retired Wound - Properties Group Placement Date: 06/16/25  - Placement Time: 0817  -LH Present on Original Admission: N  -LH Location: sternal  -LH Additional Comments: [facility]  -    Retired Wound - Properties Group Date first assessed: 06/16/25  - Time first assessed: 0817   -LH Present on Original Admission: N  -LH Location: sternal  -LH Additional Comments: [facility]  -LH      Row Name             Wound 06/16/25 0817 Right anterior knee Surgical    Wound - Properties Group Placement Date: 06/16/25  -LH Placement Time: 0817  -LH Present on Original Admission: N  -LH Side: Right  -LH Orientation: anterior  -LH Location: knee  -LH Primary Wound Type: Surgical  -LH Additional Comments: mastisol, steri strips, mepilex, ace  -LH    Retired Wound - Properties Group Placement Date: 06/16/25  - Placement Time: 0817  -LH Present on Original Admission: N  -LH Side: Right  -LH Orientation: anterior  -LH Location: knee  -LH Additional Comments: mastisol, steri strips, mepilex, ace  -LH    Retired Wound - Properties Group Placement Date: 06/16/25  - Placement Time: 0817  -LH Present on Original Admission: N  -LH Side: Right  -LH Orientation: anterior  -LH Location: knee  -LH Additional Comments: mastisol, steri strips, mepilex, ace  -LH    Retired Wound - Properties Group Date first assessed: 06/16/25  - Time first assessed: 0817  -LH Present on Original Admission: N  -LH Side: Right  -LH Location: knee  -LH Additional Comments: mastisol, steri strips, mepilex, ace  -LH      Row Name             Wound 06/16/25 0817 Right anterior groin Surgical    Wound - Properties Group Placement Date: 06/16/25  - Placement Time: 0817 -LH Side: Right  -LH Orientation: anterior  -LH Location: groin  -LH Primary Wound Type: Surgical  -LH Additional Comments: mastisol, steri strips, mepilex, ace  -LH    Retired Wound - Properties Group Placement Date: 06/16/25  - Placement Time: 0817 -LH Side: Right  -LH Orientation: anterior  -LH Location: groin  -LH Additional Comments: mastisol, steri strips, mepilex, ace  -LH    Retired Wound - Properties Group Placement Date: 06/16/25  - Placement Time: 0817 -LH Side: Right  -LH Orientation: anterior  -LH Location: groin  -LH Additional Comments: mastisol, steri  strips, mepilex, ace  -LH    Retired Wound - Properties Group Date first assessed: 06/16/25  - Time first assessed: 0817  - Side: Right  -LH Location: groin  -LH Additional Comments: mastisol, steri strips, mepilex, ace  -LH      Row Name 06/25/25 1115          Positioning and Restraints    Pre-Treatment Position sitting in chair/recliner  -KJ     Post Treatment Position chair  -KJ               User Key  (r) = Recorded By, (t) = Taken By, (c) = Cosigned By      Initials Name Provider Type    Maryann Lombardi, PTA Physical Therapist Assistant     Dania De La Rosa RN Registered Nurse                    Physical Therapy Education       Title: PT OT SLP Therapies (In Progress)       Topic: Physical Therapy (In Progress)       Point: Mobility training (In Progress)       Learning Progress Summary            Patient Acceptance, E,D, NR by KULWANT at 6/20/2025 1129    Comment: bed transfers    Acceptance, E, NR by GABRIELLA at 6/18/2025 1415    Comment: sternal prec, gait safety    Acceptance, E, VU,DU,NR by GABRIELLA at 6/18/2025 0800    Comment: benefits of activity, progression of PT, sternal prec    Acceptance, E, NR by SHAISTA at 6/17/2025 1438    Comment: continued sternal precautions, goal progression    Acceptance, E, VU by SHAISTA at 6/17/2025 1011    Comment: role of PT, sternal precvautions, progressive mobility and ambuakltion                      Point: Home exercise program (In Progress)       Learning Progress Summary            Patient Acceptance, E, NR by SHAISTA at 6/17/2025 1438    Comment: continued sternal precautions, goal progression    Acceptance, E, VU by SHAISTA at 6/17/2025 1011    Comment: role of PT, sternal precvautions, progressive mobility and ambuakltion                      Point: Body mechanics (In Progress)       Learning Progress Summary            Patient Acceptance, E, NR by SHAISTA at 6/17/2025 1438    Comment: continued sternal precautions, goal progression    Acceptance, E, VU by SHAISTA at 6/17/2025 1011    Comment: role  of PT, sternal precvautions, progressive mobility and ambuakltion                      Point: Precautions (Done)       Learning Progress Summary            Patient Acceptance, E, VU,DU,NR by GABRIELLA at 6/18/2025 0800    Comment: benefits of activity, progression of PT, sternal prec    Acceptance, E, NR by SHAISTA at 6/17/2025 1438    Comment: continued sternal precautions, goal progression    Acceptance, E, VU by SHAISTA at 6/17/2025 1011    Comment: role of PT, sternal precvautions, progressive mobility and ambuakltion                                      User Key       Initials Effective Dates Name Provider Type Discipline    GABRIELLA 02/03/23 -  Dereck Farias, PT DPT Physical Therapist PT    KULWANT 02/03/23 -  Vale Mack, PTA Physical Therapist Assistant PT    SHAISTA 08/15/24 -  Andrew Palomino, PT DPT Physical Therapist PT                  PT Recommendation and Plan             Time Calculation:    PT Charges       Row Name 06/25/25 1140             Time Calculation    Start Time 1115  -KJ      Stop Time 1140  -KJ      Time Calculation (min) 25 min  -KJ      PT Received On 06/25/25  -KJ      PT Goal Re-Cert Due Date 06/27/25  -KJ         Time Calculation- PT    Total Timed Code Minutes- PT 25 minute(s)  -KJ                User Key  (r) = Recorded By, (t) = Taken By, (c) = Cosigned By      Initials Name Provider Type    Maryann Lombardi PTA Physical Therapist Assistant                  Therapy Charges for Today       Code Description Service Date Service Provider Modifiers Qty    34246868511 HC GAIT TRAINING EA 15 MIN 6/24/2025 Maryann Mazariegos, PTA GP 2    37051877451 HC GAIT TRAINING EA 15 MIN 6/24/2025 Maryann Mazariegos PTA GP 1    49479305375 HC GAIT TRAINING EA 15 MIN 6/25/2025 Maryann Mazariegos, PTA GP 2            PT G-Codes  Outcome Measure Options: AM-PAC 6 Clicks Basic Mobility (PT)  AM-PAC 6 Clicks Score (PT): 18    Maryann Mazariegos PTA  6/25/2025

## 2025-06-26 ENCOUNTER — APPOINTMENT (OUTPATIENT)
Dept: GENERAL RADIOLOGY | Facility: HOSPITAL | Age: 80
End: 2025-06-26
Payer: MEDICARE

## 2025-06-26 LAB
ANION GAP SERPL CALCULATED.3IONS-SCNC: 13 MMOL/L (ref 5–15)
BUN SERPL-MCNC: 14.8 MG/DL (ref 8–23)
BUN/CREAT SERPL: 17.4 (ref 7–25)
CALCIUM SPEC-SCNC: 8.4 MG/DL (ref 8.6–10.5)
CHLORIDE SERPL-SCNC: 99 MMOL/L (ref 98–107)
CO2 SERPL-SCNC: 23 MMOL/L (ref 22–29)
CREAT SERPL-MCNC: 0.85 MG/DL (ref 0.76–1.27)
DEPRECATED RDW RBC AUTO: 54.4 FL (ref 37–54)
EGFRCR SERPLBLD CKD-EPI 2021: 87.8 ML/MIN/1.73
ERYTHROCYTE [DISTWIDTH] IN BLOOD BY AUTOMATED COUNT: 16.5 % (ref 12.3–15.4)
GLUCOSE BLDC GLUCOMTR-MCNC: 103 MG/DL (ref 70–130)
GLUCOSE BLDC GLUCOMTR-MCNC: 127 MG/DL (ref 70–130)
GLUCOSE BLDC GLUCOMTR-MCNC: 137 MG/DL (ref 70–130)
GLUCOSE BLDC GLUCOMTR-MCNC: 99 MG/DL (ref 70–130)
GLUCOSE SERPL-MCNC: 102 MG/DL (ref 65–99)
HCT VFR BLD AUTO: 30.6 % (ref 37.5–51)
HGB BLD-MCNC: 9.8 G/DL (ref 13–17.7)
MCH RBC QN AUTO: 29.3 PG (ref 26.6–33)
MCHC RBC AUTO-ENTMCNC: 32 G/DL (ref 31.5–35.7)
MCV RBC AUTO: 91.3 FL (ref 79–97)
PLATELET # BLD AUTO: 325 10*3/MM3 (ref 140–450)
PMV BLD AUTO: 9.4 FL (ref 6–12)
POTASSIUM SERPL-SCNC: 3.9 MMOL/L (ref 3.5–5.2)
RBC # BLD AUTO: 3.35 10*6/MM3 (ref 4.14–5.8)
SODIUM SERPL-SCNC: 135 MMOL/L (ref 136–145)
WBC NRBC COR # BLD AUTO: 7.84 10*3/MM3 (ref 3.4–10.8)

## 2025-06-26 PROCEDURE — 82948 REAGENT STRIP/BLOOD GLUCOSE: CPT

## 2025-06-26 PROCEDURE — 71045 X-RAY EXAM CHEST 1 VIEW: CPT

## 2025-06-26 PROCEDURE — 25010000002 FUROSEMIDE PER 20 MG: Performed by: NURSE PRACTITIONER

## 2025-06-26 PROCEDURE — 25010000002 ENOXAPARIN PER 10 MG: Performed by: NURSE PRACTITIONER

## 2025-06-26 PROCEDURE — 97116 GAIT TRAINING THERAPY: CPT

## 2025-06-26 PROCEDURE — 85027 COMPLETE CBC AUTOMATED: CPT | Performed by: NURSE PRACTITIONER

## 2025-06-26 PROCEDURE — 80048 BASIC METABOLIC PNL TOTAL CA: CPT | Performed by: NURSE PRACTITIONER

## 2025-06-26 RX ORDER — POTASSIUM CHLORIDE 1500 MG/1
20 TABLET, EXTENDED RELEASE ORAL ONCE
Status: COMPLETED | OUTPATIENT
Start: 2025-06-26 | End: 2025-06-26

## 2025-06-26 RX ORDER — AMIODARONE HYDROCHLORIDE 200 MG/1
200 TABLET ORAL
Status: DISCONTINUED | OUTPATIENT
Start: 2025-06-26 | End: 2025-06-29 | Stop reason: HOSPADM

## 2025-06-26 RX ORDER — FUROSEMIDE 10 MG/ML
20 INJECTION INTRAMUSCULAR; INTRAVENOUS ONCE
Status: COMPLETED | OUTPATIENT
Start: 2025-06-26 | End: 2025-06-26

## 2025-06-26 RX ADMIN — ACETAMINOPHEN 650 MG: 325 TABLET ORAL at 17:20

## 2025-06-26 RX ADMIN — ENOXAPARIN SODIUM 40 MG: 100 INJECTION SUBCUTANEOUS at 08:21

## 2025-06-26 RX ADMIN — AMIODARONE HYDROCHLORIDE 200 MG: 200 TABLET ORAL at 08:20

## 2025-06-26 RX ADMIN — FUROSEMIDE 20 MG: 10 INJECTION, SOLUTION INTRAMUSCULAR; INTRAVENOUS at 08:21

## 2025-06-26 RX ADMIN — POTASSIUM CHLORIDE 20 MEQ: 1500 TABLET, EXTENDED RELEASE ORAL at 08:21

## 2025-06-26 RX ADMIN — ACETAMINOPHEN 650 MG: 325 TABLET ORAL at 05:58

## 2025-06-26 RX ADMIN — FAMOTIDINE 20 MG: 20 TABLET, FILM COATED ORAL at 08:20

## 2025-06-26 RX ADMIN — METOPROLOL TARTRATE 25 MG: 25 TABLET, FILM COATED ORAL at 08:21

## 2025-06-26 RX ADMIN — ATORVASTATIN CALCIUM 20 MG: 10 TABLET, FILM COATED ORAL at 20:55

## 2025-06-26 RX ADMIN — METOPROLOL TARTRATE 25 MG: 25 TABLET, FILM COATED ORAL at 20:55

## 2025-06-26 RX ADMIN — Medication 10 MG: at 20:55

## 2025-06-26 RX ADMIN — POLYETHYLENE GLYCOL 3350 17 G: 17 POWDER, FOR SOLUTION ORAL at 08:22

## 2025-06-26 RX ADMIN — ASPIRIN 81 MG: 81 TABLET, COATED ORAL at 08:20

## 2025-06-26 RX ADMIN — ACETAMINOPHEN 650 MG: 325 TABLET ORAL at 12:33

## 2025-06-26 RX ADMIN — BISACODYL 10 MG: 5 TABLET, COATED ORAL at 08:22

## 2025-06-26 NOTE — THERAPY TREATMENT NOTE
Acute Care - Physical Therapy Treatment Note  [facility]     Patient Name: Linden Montelongo  : 1945  MRN: [id number]  Today's Date: 2025      Visit Dx:     ICD-10-CM ICD-9-CM   1. Impaired mobility [Z74.09]  Z74.09 799.89   2. Coronary artery disease involving native coronary artery of native heart without angina pectoris  I25.10 414.01   3. Aortic valve stenosis  I35.0 424.1     Patient Active Problem List   Diagnosis    Coronary artery disease involving native coronary artery of native heart without angina pectoris    Aortic valve stenosis    Nonrheumatic aortic (valve) stenosis     Past Medical History:   Diagnosis Date    A-fib     Hyperlipidemia     Hypertension     LUKE on CPAP     Sleep apnea      Past Surgical History:   Procedure Laterality Date    AORTIC VALVE REPAIR/REPLACEMENT N/A 2025    Procedure: AORTIC VALVE REPLACEMENT 23MM, CORONARY ARTERY BYPASS GRAFTING X2, LEFT INTERNAL MAMMARY ARTERY GRAFT, RIGHT LEG ENDOSCOPIC VEIN HARVEST,  MAZE WITH ENCOMPASS CLAMP, CRYOABLATION, LEFT ATRIAL APPENDAGE EXCLUSION 40MM, TRANSESOPHAGEAL ECHOCARDIOGRAM;  Surgeon: Andrew Mcneil MD;  Location:  [facility];  Service: Cardiothoracic;  Laterality: N/A;    APPENDECTOMY      ATRIAL APPENDAGE EXCLUSION LEFT WITH TRANSESOPHAGEAL ECHOCARDIOGRAM N/A 2025    Procedure: ATRIAL APPENDAGE EXCLUSION LEFT WITH TRANSESOPHAGEAL ECHOCARDIOGRAM;  Surgeon: Andrew Mcneil MD;  Location:  [facility];  Service: Cardiothoracic;  Laterality: N/A;    CORONARY ARTERY BYPASS GRAFT N/A 2025    Procedure: CORONARY ARTERY BYPASS GRAFTING;  Surgeon: Andrew Mcneil MD;  Location:  [facility];  Service: Cardiothoracic;  Laterality: N/A;    ESOPHAGUS SURGERY      MAZE PROCEDURE N/A 2025    Procedure: MAZE PROCEDURE WITH ENCOMPASS CLAMP;  Surgeon: Andrew Mcneil MD;  Location:  [facility];  Service: Cardiothoracic;  Laterality: N/A;    REPLACEMENT TOTAL KNEE Right     TOTAL HIP ARTHROPLASTY Right      PT Assessment (Last 12  Hours)       PT Evaluation and Treatment       Row Name 06/26/25 1059          Physical Therapy Time and Intention    Subjective Information no complaints  -KJ     Document Type therapy note (daily note)  -KJ     Mode of Treatment physical therapy  -KJ     Patient Effort good  -KJ       Row Name 06/26/25 1059          General Information    Existing Precautions/Restrictions fall;sternal  chest tube  -KJ       Row Name 06/26/25 1059          Pain    Pretreatment Pain Rating 0/10 - no pain  -KJ     Posttreatment Pain Rating 0/10 - no pain  -KJ       Row Name 06/26/25 1059          Sit-Stand Transfer    Sit-Stand [place] (Transfers) supervision;verbal cues  -KJ       Row Name 06/26/25 1059          Stand-Sit Transfer    Stand-Sit [place] (Transfers) supervision  -KJ       Row Name 06/26/25 1059          Gait/Stairs (Locomotion)    [place] Level (Gait) contact guard;standby assist  -KJ     Distance in Feet (Gait) 200  sit down rest 200'  back to room  -KJ     Deviations/Abnormal Patterns (Gait) gait speed decreased  -KJ       Row Name             Wound 06/16/25 0817 sternal Surgical    Wound - Properties Group Placement Date: 06/16/25  - Placement Time: 0817  -LH Present on Original Admission: N  -LH Location: sternal  -LH Primary Wound Type: Surgical  -LH Additional Comments: [facility]  -    Retired Wound - Properties Group Placement Date: 06/16/25  - Placement Time: 0817  -LH Present on Original Admission: N  -LH Location: sternal  -LH Additional Comments: [facility]    Retired Wound - Properties Group Placement Date: 06/16/25  - Placement Time: 0817  - Present on Original Admission: N  -LH Location: sternal  -LH Additional Comments: [facility]    Retired Wound - Properties Group Date first assessed: 06/16/25  - Time first assessed: 0817  - Present on Original Admission: N  -LH Location: sternal  -LH Additional Comments: [facility]      Row Name             Wound 06/16/25 0817 Right  anterior knee Surgical    Wound - Properties Group Placement Date: 06/16/25 -LH Placement Time: 0817  -LH Present on Original Admission: N  -LH Side: Right  -LH Orientation: anterior  -LH Location: knee  -LH Primary Wound Type: Surgical  -LH Additional Comments: mastisol, steri strips, mepilex, ace  -LH    Retired Wound - Properties Group Placement Date: 06/16/25 -LH Placement Time: 0817  -LH Present on Original Admission: N  -LH Side: Right  -LH Orientation: anterior  -LH Location: knee  -LH Additional Comments: mastisol, steri strips, mepilex, ace  -LH    Retired Wound - Properties Group Placement Date: 06/16/25 -LH Placement Time: 0817  -LH Present on Original Admission: N  -LH Side: Right  -LH Orientation: anterior  -LH Location: knee  -LH Additional Comments: mastisol, steri strips, mepilex, ace  -LH    Retired Wound - Properties Group Date first assessed: 06/16/25 -LH Time first assessed: 0817  -LH Present on Original Admission: N  -LH Side: Right  -LH Location: knee  -LH Additional Comments: mastisol, steri strips, mepilex, ace  -LH      Row Name             Wound 06/16/25 0817 Right anterior groin Surgical    Wound - Properties Group Placement Date: 06/16/25 -LH Placement Time: 0817  -LH Side: Right  -LH Orientation: anterior  -LH Location: groin  -LH Primary Wound Type: Surgical  -LH Additional Comments: mastisol, steri strips, mepilex, ace  -LH    Retired Wound - Properties Group Placement Date: 06/16/25 -LH Placement Time: 0817  -LH Side: Right  -LH Orientation: anterior  -LH Location: groin  -LH Additional Comments: mastisol, steri strips, mepilex, ace  -LH    Retired Wound - Properties Group Placement Date: 06/16/25 -LH Placement Time: 0817  -LH Side: Right  -LH Orientation: anterior  -LH Location: groin  -LH Additional Comments: mastisol, steri strips, mepilex, ace  -LH    Retired Wound - Properties Group Date first assessed: 06/16/25 -LH Time first assessed: 0817  -LH Side: Right  -LH  Location: groin  - Additional Comments: mastisol, steri strips, mepilex, ace  -LH      Row Name 06/26/25 1051          Positioning and Restraints    Pre-Treatment Position sitting in chair/recliner  -KJ     Post Treatment Position chair  -KJ               User Key  (r) = Recorded By, (t) = Taken By, (c) = Cosigned By      Initials Name Provider Type    Maryann Lombardi, PTA Physical Therapist Assistant     Dania De La Rosa, RN Registered Nurse                    Physical Therapy Education       Title: PT OT SLP Therapies (In Progress)       Topic: Physical Therapy (In Progress)       Point: Mobility training (In Progress)       Learning Progress Summary            Patient Acceptance, E,D, NR by KULWANT at 6/20/2025 1129    Comment: bed transfers    Acceptance, E, NR by GABRIELLA at 6/18/2025 1415    Comment: sternal prec, gait safety    Acceptance, E, VU,DU,NR by GABRIELLA at 6/18/2025 0800    Comment: benefits of activity, progression of PT, sternal prec    Acceptance, E, NR by SHAISTA at 6/17/2025 1438    Comment: continued sternal precautions, goal progression    Acceptance, E, VU by SHAISTA at 6/17/2025 1011    Comment: role of PT, sternal precvautions, progressive mobility and ambuakltion                      Point: Home exercise program (In Progress)       Learning Progress Summary            Patient Acceptance, E, NR by SHAISTA at 6/17/2025 1438    Comment: continued sternal precautions, goal progression    Acceptance, E, VU by SHAISTA at 6/17/2025 1011    Comment: role of PT, sternal precvautions, progressive mobility and ambuakltion                      Point: Body mechanics (In Progress)       Learning Progress Summary            Patient Acceptance, E, NR by SHAISTA at 6/17/2025 1438    Comment: continued sternal precautions, goal progression    Acceptance, E, VU by SHAISTA at 6/17/2025 1011    Comment: role of PT, sternal precvautions, progressive mobility and ambuakltion                      Point: Precautions (Done)       Learning Progress Summary             Patient Acceptance, E, VU,DU,NR by GABRIELLA at 6/18/2025 0800    Comment: benefits of activity, progression of PT, sternal prec    Acceptance, E, NR by SHAISTA at 6/17/2025 1438    Comment: continued sternal precautions, goal progression    Acceptance, E, VU by SHAISTA at 6/17/2025 1011    Comment: role of PT, sternal precvautions, progressive mobility and ambuakltion                                      User Key       Initials Effective Dates Name Provider Type Discipline    GABRIELLA 02/03/23 -  Dereck Farias, PT DPT Physical Therapist PT    KULWANT 02/03/23 -  Vale Mack PTA Physical Therapist Assistant PT    SHAISTA 08/15/24 -  Andrew Palomino, PT DPT Physical Therapist PT                  PT Recommendation and Plan             Time Calculation:    PT Charges       Row Name 06/26/25 1115             Time Calculation    Start Time 1059  -KJ      Stop Time 1115  -KJ      Time Calculation (min) 16 min  -KJ      PT Received On 06/26/25  -KJ      PT Goal Re-Cert Due Date 06/27/25  -KJ         Time Calculation- PT    Total Timed Code Minutes- PT 16 minute(s)  -KJ                User Key  (r) = Recorded By, (t) = Taken By, (c) = Cosigned By      Initials Name Provider Type    Maryann Lombardi PTA Physical Therapist Assistant                  Therapy Charges for Today       Code Description Service Date Service Provider Modifiers Qty    73030350942 HC GAIT TRAINING EA 15 MIN 6/25/2025 Maryann Mazariegos PTA GP 2    35390581743 HC GAIT TRAINING EA 15 MIN 6/26/2025 Maryann Mazariegos PTA GP 1            PT G-Codes  Outcome Measure Options: AM-PAC 6 Clicks Basic Mobility (PT)  AM-PAC 6 Clicks Score (PT): 18    Maryann Mazariegos PTA  6/26/2025

## 2025-06-26 NOTE — PROGRESS NOTES
"Patient name: Linden Montelongo  Patient : 1945  VISIT # [id number]  MR #[id number]    Procedure:Procedure(s):  AORTIC VALVE REPLACEMENT 23MM, CORONARY ARTERY BYPASS GRAFTING X2, LEFT INTERNAL MAMMARY ARTERY GRAFT, RIGHT LEG ENDOSCOPIC VEIN HARVEST,  MAZE WITH ENCOMPASS CLAMP, CRYOABLATION, LEFT ATRIAL APPENDAGE EXCLUSION 40MM, TRANSESOPHAGEAL ECHOCARDIOGRAM  CORONARY ARTERY BYPASS GRAFTING  MAZE PROCEDURE WITH ENCOMPASS CLAMP  ATRIAL APPENDAGE EXCLUSION LEFT WITH TRANSESOPHAGEAL ECHOCARDIOGRAM  Procedure Date:2025  POD:10 Days Post-Op    Subjective     On room air.  Weight is down 5 pounds and he is 5 pounds below his baseline.  He is having bowel movements.  Walking 200 feet with physical therapy.    Telemetry: Sinus rhythm 65-73  IV drips: None         Objective     Visit Vitals  /65 (BP Location: Right arm, Patient Position: Sitting)   Pulse 67   Temp 97.9 °F (36.6 °C) (Oral)   Resp 18   Ht 177 cm (69.69\")   Wt 106 kg (233 lb 6.4 oz)   SpO2 93%   BMI 33.79 kg/m²   Baseline weight 238 pounds    Intake/Output Summary (Last 24 hours) at 2025 0823  Last data filed at 2025 0600  Gross per 24 hour   Intake 720 ml   Output 3425 ml   Net -2705 ml     Left pleural drain: 700 mL in 24 hours, serosanguineous, no airleak    Lab:     CBC:  Results from last 7 days   Lab Units 25  0334 25  0534 25  0506   WBC 10*3/mm3 7.84 6.71 8.70   HEMATOCRIT % 30.6* 30.9* 27.9*   PLATELETS 10*3/mm3 325 280 257          BMP:  Results from last 7 days   Lab Units 25  0334 25  0534 25  0506   SODIUM mmol/L 135* 134* 136   POTASSIUM mmol/L 3.9 4.5 3.6   CHLORIDE mmol/L 99 102 102   CO2 mmol/L 23.0 19.0* 22.0   GLUCOSE mg/dL 102* 104* 111*   BUN mg/dL 14.8 14.2 14.9   CREATININE mg/dL 0.85 0.68* 0.78          COAG:  Results from last 7 days   Lab Units 25  2225   INR  0.96   APTT seconds 38.4*       IMAGES:       Imaging Results (Last 24 Hours)       Procedure Component Value " Units Date/Time    XR Chest 1 View [[id number]] Collected: 06/26/25 0715     Updated: 06/26/25 0720    Narrative:      XR CHEST 1 VW-     HISTORY: Post-Op Heart Surgery     COMPARISON: 6/25/2025     FINDINGS: Frontal view of the chest obtained.     Median sternotomy wires with prosthetic cardiac valve and left atrial  appendage clip. Left inferior thoracotomy tube. Prominent central  pulmonary vascular structures with stable cardiomegaly. Very small  pleural effusions. Tiny left apical pneumothorax. No acute regional bony  pathology.       Impression:      1. Mediastinal surgical changes with a left inferior thoracotomy tube.  Tiny left apical pneumothorax. Small pleural effusions with probable  bibasilar atelectasis. Pulmonary venous congestion.        This report was signed and finalized on 6/26/2025 7:17 AM by Dr. Kayla Yang MD.       CT Chest Without Contrast Diagnostic [[id number]] Collected: 06/25/25 1414     Updated: 06/25/25 1422    Narrative:      CT CHEST WO CONTRAST DIAGNOSTIC-      HISTORY: left pleural effusion, persistent left chest tube output;  Z74.09-Other reduced mobility; I25.10-Atherosclerotic heart disease of  native coronary artery without angina pectoris; I35.0-Nonrheumatic  aortic (valve) stenosis     COMPARISON: 6/3/2025     DOSE LENGTH PRODUCT: 402.48 mGy.cm . Automated exposure control was also  utilized to decrease patient radiation dose.     TECHNIQUE: Serial helical tomographic images of the chest were acquired  without contrast. Multiplanar reformatted images were provided for  review.     FINDINGS:     Visualized neck base: The imaged portion of the base of the neck appears  unremarkable. The patient has undergone recent median sternotomy.     Lungs: A left-sided thoracostomy tube is in place via anterior approach.  The tip projects in the posterior costophrenic angle. A small left  effusion is present as well as a small anterior pneumothorax. Left  lingular and left lower lobe  subsegmental atelectasis is present. A  moderate right-sided pleural effusion is present with right basilar  compressive atelectasis. The lungs are otherwise clear except for remote  granulomatous disease.. No right-sided pneumothorax. The airways are  clear.     Heart: There is mild cardiac enlargement. The patient has undergone  aortic valve replacement as well as left atrial appendage occlusion.  Mitral annulus calcifications are present. There is a trace pericardial  effusion. Advanced coronary artery atheromatous calcification.     Vasculature: There is calcified atheromatous plaque in the aorta without  aneurysmal dilation. The pulmonary arteries are normal in caliber.     Mediastinum and lymph nodes: No suspicious hilar or mediastinal  adenopathy. Incidental granulomatous calcification..     Skeletal and soft tissues: Recent median sternotomy with mild stranding  overlying the sternum. No discrete fluid collection or gas present.. No  acute bony abnormality. Moderate thoracic spine degenerative change.     Upper abdomen: There is a 13 mm hypodense right adrenal nodule likely  represent an adenoma. Limited images the upper abdomen are otherwise  unremarkable..          Impression:      1. A left-sided thoracostomy tube is well-positioned with the tip in the  posterior costophrenic angle. Small residual effusion with left basilar  atelectasis. Small anterior left-sided pneumothorax.  2. Moderate right effusion with right basilar compressive atelectasis.  No suspicious nodularity demonstrated. No right-sided pneumothorax.  3. Status post recent median sternotomy with aortic valve replacement. A  trace pericardial effusion is present. There is postoperative air within  the anterior mediastinum as well as evidence of recent median sternotomy  with overlying fat stranding in the subcutaneous tissues overlying the  sternum.  4. Suspected small right adrenal adenoma.           This report was signed and finalized on  6/25/2025 2:19 PM by Dr. Florencio Parker MD.             CXR: Left chest tube in stable position with small pleural effusions.  Bibasilar atelectasis.  Tiny left apical pneumothorax.    Physical Exam:  General: Alert, oriented. No apparent distress.   Cardiovascular: Regular rate and rhythm without murmur, rubs, or gallops.    Pulmonary: Clear to auscultation bilaterally without wheezing, rubs, or rales.  Chest: Sternotomy incision clean, dry, and intact. Sternum stable. No clicks.  Left chest tube to 20 cm suction. No air leak. Fluid is serosanguineous  Abdomen: Soft, nondistended, and nontender.  Extremities: Warm, moves all extremities. No edema. Saphenectomy site clean, dry, and intact  Neurologic:  Grossly intact with no focal deficits.            Impression:  Aortic valve stenosis  Coronary artery disease  Paroxysmal atrial fibrillation  Hyperlipidemia  Hypertension      Plan:  Lasix 20 mg IV x 1 dose today  Routine postcardiac surgery care  Encourage pulmonary toilet ambulation  Keep chest tube as output is too high for removal  Likely ready for discharge when chest tube output decreases.  Discussed possible chest tube removal tomorrow with the understanding that he may need outpatient thoracentesis after discharge.  Will reassess in the morning and evaluate chest tube output at that time.  Discussed with patient and nursing      JUDE Urena  06/26/25  08:23 CDT

## 2025-06-26 NOTE — PLAN OF CARE
Goal Outcome Evaluation:   VSS A+Ox4. No complaints of pain all day. Continuing to monitor chest tube output; potential removal tomorrow. Had bm today.

## 2025-06-26 NOTE — PLAN OF CARE
Goal Outcome Evaluation:              Outcome Evaluation: VSS. S 65-73 per tele. c/o pain this shift and recieved medication per physician orders. call light within reach. safety maintained.

## 2025-06-26 NOTE — CASE MANAGEMENT/SOCIAL WORK
Continued Stay Note  NAIMA Crenshaw     Patient Name: Linden Montelongo  MRN: [id number]  Today's Date: 6/26/2025    Admit Date: 6/16/2025    Plan: Home   Discharge Plan       Row Name 06/26/25 0845       Plan    Plan Home    Plan Comments Patient plans to return home upon discharge with no discharge needs at this time.                   Discharge Codes    No documentation.                 Expected Discharge Date and Time       Expected Discharge Date Expected Discharge Time    Jun 24, 2025               CAROL Randolph

## 2025-06-26 NOTE — THERAPY TREATMENT NOTE
Acute Care - Physical Therapy Treatment Note  [facility]     Patient Name: Linden Montelongo  : 1945  MRN: [id number]  Today's Date: 2025      Visit Dx:     ICD-10-CM ICD-9-CM   1. Impaired mobility [Z74.09]  Z74.09 799.89   2. Coronary artery disease involving native coronary artery of native heart without angina pectoris  I25.10 414.01   3. Aortic valve stenosis  I35.0 424.1     Patient Active Problem List   Diagnosis    Coronary artery disease involving native coronary artery of native heart without angina pectoris    Aortic valve stenosis    Nonrheumatic aortic (valve) stenosis     Past Medical History:   Diagnosis Date    A-fib     Hyperlipidemia     Hypertension     LUKE on CPAP     Sleep apnea      Past Surgical History:   Procedure Laterality Date    AORTIC VALVE REPAIR/REPLACEMENT N/A 2025    Procedure: AORTIC VALVE REPLACEMENT 23MM, CORONARY ARTERY BYPASS GRAFTING X2, LEFT INTERNAL MAMMARY ARTERY GRAFT, RIGHT LEG ENDOSCOPIC VEIN HARVEST,  MAZE WITH ENCOMPASS CLAMP, CRYOABLATION, LEFT ATRIAL APPENDAGE EXCLUSION 40MM, TRANSESOPHAGEAL ECHOCARDIOGRAM;  Surgeon: Andrew Mcneil MD;  Location:  [facility] OR;  Service: Cardiothoracic;  Laterality: N/A;    APPENDECTOMY      ATRIAL APPENDAGE EXCLUSION LEFT WITH TRANSESOPHAGEAL ECHOCARDIOGRAM N/A 2025    Procedure: ATRIAL APPENDAGE EXCLUSION LEFT WITH TRANSESOPHAGEAL ECHOCARDIOGRAM;  Surgeon: Andrew Mcneil MD;  Location:  [facility] OR;  Service: Cardiothoracic;  Laterality: N/A;    CORONARY ARTERY BYPASS GRAFT N/A 2025    Procedure: CORONARY ARTERY BYPASS GRAFTING;  Surgeon: Andrew Mcneil MD;  Location:  [facility] OR;  Service: Cardiothoracic;  Laterality: N/A;    ESOPHAGUS SURGERY      MAZE PROCEDURE N/A 2025    Procedure: MAZE PROCEDURE WITH ENCOMPASS CLAMP;  Surgeon: Andrew Mcneil MD;  Location:  [facility];  Service: Cardiothoracic;  Laterality: N/A;    REPLACEMENT TOTAL KNEE Right     TOTAL HIP ARTHROPLASTY Right      PT Assessment (Last 12  Dr. Caicedo,   I understand that Ana's cath went well and no blockages were seen. Do you have any concerns about her moving ahead with surgery at the end of the month, given the ischemia seen on stress test?      Thanks!  Ave Reid   "Hours)       PT Evaluation and Treatment       Row Name 06/26/25 1516 06/26/25 1059       Physical Therapy Time and Intention    Subjective Information no complaints  -KJ no complaints  -KJ    Document Type therapy note (daily note)  -KJ therapy note (daily note)  -KJ    Mode of Treatment physical therapy  -KJ physical therapy  -KJ    Patient Effort good  -KJ good  -KJ      Row Name 06/26/25 1516 06/26/25 1059       General Information    Existing Precautions/Restrictions fall;sternal  chest tube  -KJ fall;sternal  chest tube  -KJ      Row Name 06/26/25 1516 06/26/25 1059       Pain    Pretreatment Pain Rating 0/10 - no pain  -KJ 0/10 - no pain  -KJ    Posttreatment Pain Rating 0/10 - no pain  -KJ 0/10 - no pain  -KJ      Row Name 06/26/25 1516          Bed Mobility    Comment, (Bed Mobility) up in chair  -KJ       Row Name 06/26/25 1516 06/26/25 1059       Sit-Stand Transfer    Sit-Stand [place] (Transfers) supervision;verbal cues  -KJ supervision;verbal cues  -KJ      Row Name 06/26/25 1516 06/26/25 1059       Stand-Sit Transfer    Stand-Sit [place] (Transfers) supervision  -KJ supervision  -KJ      Row Name 06/26/25 1516 06/26/25 1059       Gait/Stairs (Locomotion)    [place] Level (Gait) contact guard;standby assist  -KJ contact guard;standby assist  -KJ    Distance in Feet (Gait) 200  sitting rest due to SOB. 200\" back to room  -  sit down rest 200'  back to room  -KJ    Deviations/Abnormal Patterns (Gait) gait speed decreased  -KJ gait speed decreased  -KJ      Row Name 06/26/25 1516          Motor Skills    Comments, Therapeutic Exercise cardiac protocol  -KJ       Row Name             Wound 06/16/25 0817 sternal Surgical    Wound - Properties Group Placement Date: 06/16/25  - Placement Time: 0817  -LH Present on Original Admission: N  -LH Location: sternal  -LH Primary Wound Type: Surgical  -LH Additional Comments: Prevena  -LH    Retired Wound - Properties Group Placement Date: " 06/16/25  - Placement Time: 0817  -LH Present on Original Admission: N  -LH Location: sternal  -LH Additional Comments: Prevena  -LH    Retired Wound - Properties Group Placement Date: 06/16/25  -LH Placement Time: 0817  -LH Present on Original Admission: N  -LH Location: sternal  -LH Additional Comments: Prevena  -LH    Retired Wound - Properties Group Date first assessed: 06/16/25  - Time first assessed: 0817  -LH Present on Original Admission: N  -LH Location: sternal  -LH Additional Comments: Prevena  -LH      Row Name             Wound 06/16/25 0817 Right anterior knee Surgical    Wound - Properties Group Placement Date: 06/16/25  - Placement Time: 0817  -LH Present on Original Admission: N  -LH Side: Right  -LH Orientation: anterior  -LH Location: knee  -LH Primary Wound Type: Surgical  -LH Additional Comments: mastisol, steri strips, mepilex, ace  -LH    Retired Wound - Properties Group Placement Date: 06/16/25  - Placement Time: 0817  -LH Present on Original Admission: N  -LH Side: Right  -LH Orientation: anterior  -LH Location: knee  -LH Additional Comments: mastisol, steri strips, mepilex, ace  -LH    Retired Wound - Properties Group Placement Date: 06/16/25  - Placement Time: 0817  -LH Present on Original Admission: N  -LH Side: Right  -LH Orientation: anterior  -LH Location: knee  -LH Additional Comments: mastisol, steri strips, mepilex, ace  -LH    Retired Wound - Properties Group Date first assessed: 06/16/25  - Time first assessed: 0817  -LH Present on Original Admission: N  -LH Side: Right  -LH Location: knee  -LH Additional Comments: mastisol, steri strips, mepilex, ace  -LH      Row Name             Wound 06/16/25 0817 Right anterior groin Surgical    Wound - Properties Group Placement Date: 06/16/25  -LH Placement Time: 0817  -LH Side: Right  -LH Orientation: anterior  -LH Location: groin  -LH Primary Wound Type: Surgical  -LH Additional Comments: mastisol, steri strips, mepilex, ace   -LH    Retired Wound - Properties Group Placement Date: 06/16/25  - Placement Time: 0817 -LH Side: Right  -LH Orientation: anterior  -LH Location: groin  -LH Additional Comments: mastisol, steri strips, mepilex, ace  -LH    Retired Wound - Properties Group Placement Date: 06/16/25  - Placement Time: 0817 -LH Side: Right  -LH Orientation: anterior  -LH Location: groin  -LH Additional Comments: mastisol, steri strips, mepilex, ace  -LH    Retired Wound - Properties Group Date first assessed: 06/16/25  - Time first assessed: 0817 -LH Side: Right  -LH Location: groin  -LH Additional Comments: mastisol, steri strips, mepilex, ace  -LH      Row Name 06/26/25 1516 06/26/25 1059       Positioning and Restraints    Pre-Treatment Position sitting in chair/recliner  -KJ sitting in chair/recliner  -KJ    Post Treatment Position chair  -KJ chair  -KJ              User Key  (r) = Recorded By, (t) = Taken By, (c) = Cosigned By      Initials Name Provider Type    Maryann Lombardi PTA Physical Therapist Assistant     Dania De La Rosa RN Registered Nurse                    Physical Therapy Education       Title: PT OT SLP Therapies (In Progress)       Topic: Physical Therapy (In Progress)       Point: Mobility training (In Progress)       Learning Progress Summary            Patient Acceptance, E,D, NR by KULWANT at 6/20/2025 1129    Comment: bed transfers    Acceptance, E, NR by GABRIELLA at 6/18/2025 1415    Comment: sternal prec, gait safety    Acceptance, E, VU,DU,NR by GABRIELLA at 6/18/2025 0800    Comment: benefits of activity, progression of PT, sternal prec    Acceptance, E, NR by SHAISTA at 6/17/2025 1438    Comment: continued sternal precautions, goal progression    Acceptance, E, VU by SHAISTA at 6/17/2025 1011    Comment: role of PT, sternal precvautions, progressive mobility and ambuakltion                      Point: Home exercise program (In Progress)       Learning Progress Summary            Patient Acceptance, E, NR by SHAISTA at  6/17/2025 1438    Comment: continued sternal precautions, goal progression    Acceptance, E, VU by SHAISTA at 6/17/2025 1011    Comment: role of PT, sternal precvautions, progressive mobility and ambuakltion                      Point: Body mechanics (In Progress)       Learning Progress Summary            Patient Acceptance, E, NR by SHAISTA at 6/17/2025 1438    Comment: continued sternal precautions, goal progression    Acceptance, E, VU by SHAISTA at 6/17/2025 1011    Comment: role of PT, sternal precvautions, progressive mobility and ambuakltion                      Point: Precautions (Done)       Learning Progress Summary            Patient Acceptance, E, VU,DU,NR by GABRIELLA at 6/18/2025 0800    Comment: benefits of activity, progression of PT, sternal prec    Acceptance, E, NR by SHAISTA at 6/17/2025 1438    Comment: continued sternal precautions, goal progression    Acceptance, E, VU by SHAISTA at 6/17/2025 1011    Comment: role of PT, sternal precvautions, progressive mobility and ambuakltion                                      User Key       Initials Effective Dates Name Provider Type Discipline    GABRIELLA 02/03/23 -  Dereck Farias, PT DPT Physical Therapist PT    KULWANT 02/03/23 -  Vale Mack PTA Physical Therapist Assistant PT     08/15/24 -  Andrew Palomino, PT DPT Physical Therapist PT                  PT Recommendation and Plan             Time Calculation:    PT Charges       Row Name 06/26/25 1609 06/26/25 1115          Time Calculation    Start Time 1516  -KJ 1059  -KJ     Stop Time 1529  -KJ 1115  -KJ     Time Calculation (min) 13 min  -KJ 16 min  -KJ     PT Received On 06/26/25  -KJ 06/26/25  -KJ     PT Goal Re-Cert Due Date 06/27/25  -KJ 06/27/25  -KJ        Time Calculation- PT    Total Timed Code Minutes- PT 13 minute(s)  -KJ 16 minute(s)  -KJ               User Key  (r) = Recorded By, (t) = Taken By, (c) = Cosigned By      Initials Name Provider Type    KJ Maryann Mazariegos, PTA Physical Therapist Assistant                   Therapy Charges for Today       Code Description Service Date Service Provider Modifiers Qty    99138356844 HC GAIT TRAINING EA 15 MIN 6/25/2025 Maryann Mazariegos, PTA GP 2    78680783301 HC GAIT TRAINING EA 15 MIN 6/26/2025 Maryann Mazariegos, PTA GP 1    17598870666 HC GAIT TRAINING EA 15 MIN 6/26/2025 Maryann Mazariegos, PTA GP 1            PT G-Codes  Outcome Measure Options: AM-PAC 6 Clicks Basic Mobility (PT)  AM-PAC 6 Clicks Score (PT): 18    Maryann Mazariegos PTA  6/26/2025

## 2025-06-27 ENCOUNTER — APPOINTMENT (OUTPATIENT)
Dept: GENERAL RADIOLOGY | Facility: HOSPITAL | Age: 80
End: 2025-06-27
Payer: MEDICARE

## 2025-06-27 LAB
ANION GAP SERPL CALCULATED.3IONS-SCNC: 12 MMOL/L (ref 5–15)
BUN SERPL-MCNC: 13.4 MG/DL (ref 8–23)
BUN/CREAT SERPL: 17 (ref 7–25)
CALCIUM SPEC-SCNC: 8.1 MG/DL (ref 8.6–10.5)
CHLORIDE SERPL-SCNC: 102 MMOL/L (ref 98–107)
CO2 SERPL-SCNC: 19 MMOL/L (ref 22–29)
CREAT SERPL-MCNC: 0.79 MG/DL (ref 0.76–1.27)
DEPRECATED RDW RBC AUTO: 63.2 FL (ref 37–54)
EGFRCR SERPLBLD CKD-EPI 2021: 89.8 ML/MIN/1.73
ERYTHROCYTE [DISTWIDTH] IN BLOOD BY AUTOMATED COUNT: 17 % (ref 12.3–15.4)
GLUCOSE BLDC GLUCOMTR-MCNC: 110 MG/DL (ref 70–130)
GLUCOSE BLDC GLUCOMTR-MCNC: 113 MG/DL (ref 70–130)
GLUCOSE BLDC GLUCOMTR-MCNC: 117 MG/DL (ref 70–130)
GLUCOSE BLDC GLUCOMTR-MCNC: 126 MG/DL (ref 70–130)
GLUCOSE SERPL-MCNC: 103 MG/DL (ref 65–99)
HCT VFR BLD AUTO: 33 % (ref 37.5–51)
HGB BLD-MCNC: 9.7 G/DL (ref 13–17.7)
MCH RBC QN AUTO: 30.1 PG (ref 26.6–33)
MCHC RBC AUTO-ENTMCNC: 29.4 G/DL (ref 31.5–35.7)
MCV RBC AUTO: 102.5 FL (ref 79–97)
PLATELET # BLD AUTO: 312 10*3/MM3 (ref 140–450)
PMV BLD AUTO: 9.4 FL (ref 6–12)
POTASSIUM SERPL-SCNC: 3.9 MMOL/L (ref 3.5–5.2)
RBC # BLD AUTO: 3.22 10*6/MM3 (ref 4.14–5.8)
SODIUM SERPL-SCNC: 133 MMOL/L (ref 136–145)
WBC NRBC COR # BLD AUTO: 8.07 10*3/MM3 (ref 3.4–10.8)

## 2025-06-27 PROCEDURE — 25010000002 LIDOCAINE PF 2% 2 % SOLUTION 10 ML AMPULE: Performed by: NURSE PRACTITIONER

## 2025-06-27 PROCEDURE — 25010000002 ENOXAPARIN PER 10 MG: Performed by: NURSE PRACTITIONER

## 2025-06-27 PROCEDURE — 85027 COMPLETE CBC AUTOMATED: CPT | Performed by: NURSE PRACTITIONER

## 2025-06-27 PROCEDURE — 97116 GAIT TRAINING THERAPY: CPT

## 2025-06-27 PROCEDURE — 82948 REAGENT STRIP/BLOOD GLUCOSE: CPT

## 2025-06-27 PROCEDURE — 71045 X-RAY EXAM CHEST 1 VIEW: CPT

## 2025-06-27 PROCEDURE — 80048 BASIC METABOLIC PNL TOTAL CA: CPT | Performed by: NURSE PRACTITIONER

## 2025-06-27 PROCEDURE — 97164 PT RE-EVAL EST PLAN CARE: CPT

## 2025-06-27 RX ADMIN — AMIODARONE HYDROCHLORIDE 200 MG: 200 TABLET ORAL at 08:48

## 2025-06-27 RX ADMIN — TALC 4 G: 4 POWDER INTRAPLEURAL at 13:38

## 2025-06-27 RX ADMIN — METOPROLOL TARTRATE 25 MG: 25 TABLET, FILM COATED ORAL at 20:55

## 2025-06-27 RX ADMIN — ACETAMINOPHEN 650 MG: 325 TABLET ORAL at 23:15

## 2025-06-27 RX ADMIN — BISACODYL 10 MG: 5 TABLET, COATED ORAL at 20:55

## 2025-06-27 RX ADMIN — Medication 10 MG: at 20:55

## 2025-06-27 RX ADMIN — ACETAMINOPHEN 650 MG: 325 TABLET ORAL at 00:14

## 2025-06-27 RX ADMIN — FAMOTIDINE 20 MG: 20 TABLET, FILM COATED ORAL at 08:47

## 2025-06-27 RX ADMIN — ENOXAPARIN SODIUM 40 MG: 100 INJECTION SUBCUTANEOUS at 08:48

## 2025-06-27 RX ADMIN — ACETAMINOPHEN 650 MG: 325 TABLET ORAL at 05:19

## 2025-06-27 RX ADMIN — METOPROLOL TARTRATE 25 MG: 25 TABLET, FILM COATED ORAL at 08:48

## 2025-06-27 RX ADMIN — ACETAMINOPHEN 650 MG: 325 TABLET ORAL at 12:22

## 2025-06-27 RX ADMIN — ASPIRIN 81 MG: 81 TABLET, COATED ORAL at 08:47

## 2025-06-27 RX ADMIN — ACETAMINOPHEN 650 MG: 325 TABLET ORAL at 17:29

## 2025-06-27 RX ADMIN — ATORVASTATIN CALCIUM 20 MG: 10 TABLET, FILM COATED ORAL at 20:55

## 2025-06-27 NOTE — THERAPY RE-EVALUATION
Patient Name: Linden Montelongo  : 1945    MRN: [id number]                              Today's Date: 2025       Admit Date: 2025    Visit Dx:     ICD-10-CM ICD-9-CM   1. Impaired mobility [Z74.09]  Z74.09 799.89   2. Coronary artery disease involving native coronary artery of native heart without angina pectoris  I25.10 414.01   3. Aortic valve stenosis  I35.0 424.1     Patient Active Problem List   Diagnosis    Coronary artery disease involving native coronary artery of native heart without angina pectoris    Aortic valve stenosis    Nonrheumatic aortic (valve) stenosis     Past Medical History:   Diagnosis Date    A-fib     Hyperlipidemia     Hypertension     LUKE on CPAP     Sleep apnea      Past Surgical History:   Procedure Laterality Date    AORTIC VALVE REPAIR/REPLACEMENT N/A 2025    Procedure: AORTIC VALVE REPLACEMENT 23MM, CORONARY ARTERY BYPASS GRAFTING X2, LEFT INTERNAL MAMMARY ARTERY GRAFT, RIGHT LEG ENDOSCOPIC VEIN HARVEST,  MAZE WITH ENCOMPASS CLAMP, CRYOABLATION, LEFT ATRIAL APPENDAGE EXCLUSION 40MM, TRANSESOPHAGEAL ECHOCARDIOGRAM;  Surgeon: Andrew Mcneil MD;  Location:  [facility];  Service: Cardiothoracic;  Laterality: N/A;    APPENDECTOMY      ATRIAL APPENDAGE EXCLUSION LEFT WITH TRANSESOPHAGEAL ECHOCARDIOGRAM N/A 2025    Procedure: ATRIAL APPENDAGE EXCLUSION LEFT WITH TRANSESOPHAGEAL ECHOCARDIOGRAM;  Surgeon: Andrew Mcneil MD;  Location:  [facility];  Service: Cardiothoracic;  Laterality: N/A;    CORONARY ARTERY BYPASS GRAFT N/A 2025    Procedure: CORONARY ARTERY BYPASS GRAFTING;  Surgeon: Andrew Mcneil MD;  Location:  [facility];  Service: Cardiothoracic;  Laterality: N/A;    ESOPHAGUS SURGERY      MAZE PROCEDURE N/A 2025    Procedure: MAZE PROCEDURE WITH ENCOMPASS CLAMP;  Surgeon: Andrew Mcneil MD;  Location:  [facility];  Service: Cardiothoracic;  Laterality: N/A;    REPLACEMENT TOTAL KNEE Right     TOTAL HIP ARTHROPLASTY Right       General Information       Row  Name 06/27/25 1111          Physical Therapy Time and Intention    Document Type re-evaluation  re-eval due for time of admission  -     Mode of Treatment physical therapy  -       Row Name 06/27/25 1111          General Information    Patient Profile Reviewed yes  -AJ     Existing Precautions/Restrictions fall;sternal;other (see comments)  L sided chest tube  -       Row Name 06/27/25 1111          Cognition    Orientation Status (Cognition) oriented x 4  -       Row Name 06/27/25 1111          Safety Issues/Impairments Affecting Functional Mobility    Impairments Affecting Function (Mobility) pain  -               User Key  (r) = Recorded By, (t) = Taken By, (c) = Cosigned By      Initials Name Provider Type    Andrew Perez PT DPT Physical Therapist                   Mobility       Row Name 06/27/25 1111          Bed Mobility    Comment, (Bed Mobility) in recliner upon arrival  -       Row Name 06/27/25 1111          Bed-Chair Transfer    Bed-Chair [place] (Transfers) standby assist  -       Row Name 06/27/25 1111          Sit-Stand Transfer    Sit-Stand [place] (Transfers) standby assist  -       Row Name 06/27/25 1111          Gait/Stairs (Locomotion)    [place] Level (Gait) standby assist  -     Patient was able to Ambulate yes  -AJ     Distance in Feet (Gait) 420  -AJ     Deviations/Abnormal Patterns (Gait) bilateral deviations;stride length decreased  -               User Key  (r) = Recorded By, (t) = Taken By, (c) = Cosigned By      Initials Name Provider Type    Andrew Perez PT DPT Physical Therapist                   Obj/Interventions       Row Name 06/27/25 1111          Range of Motion Comprehensive    General Range of Motion bilateral lower extremity ROM WFL  -       Row Name 06/27/25 1111          Strength Comprehensive (MMT)    General Manual Muscle Testing (MMT) Assessment no strength deficits identified  -       Row Name 06/27/25 1111          Motor  Skills    Motor Skills functional endurance  -AJ     Functional Endurance improving as he felt less pain and SOA, did not require rest break  -       Row Name 06/27/25 1111          Balance    Balance Assessment sitting static balance;sitting dynamic balance;standing static balance;standing dynamic balance  -AJ     Static Sitting Balance independent  -AJ     Dynamic Sitting Balance independent  -AJ     Position, Sitting Balance supported;sitting in chair  -AJ     Static Standing Balance standby assist  -AJ     Dynamic Standing Balance standby assist  -AJ     Position/Device Used, Standing Balance unsupported  -AJ     Balance Interventions sitting;standing;sit to stand;supported;static;dynamic  -AJ               User Key  (r) = Recorded By, (t) = Taken By, (c) = Cosigned By      Initials Name Provider Type    Andrew Perez, PT DPT Physical Therapist                   Goals/Plan       Row Name 06/27/25 1111          Bed Mobility Goal 1 (PT)    Activity/Assistive Device (Bed Mobility Goal 1, PT) bed mobility activities, all;rolling to left;rolling to right;supine to sit;sit to supine  -AJ     [place] Level/Cues Needed (Bed Mobility Goal 1, PT) independent  -AJ     Time Frame (Bed Mobility Goal 1, PT) long term goal (LTG);10 days  -AJ     Strategies/Barriers (Bed Mobility Goal 1, PT) maintain sternal precuations  -AJ     Progress/Outcomes (Bed Mobility Goal 1, PT) good progress toward goal;continuing progress toward goal;goal ongoing  -       Row Name 06/27/25 1111          Transfer Goal 1 (PT)    Activity/Assistive Device (Transfer Goal 1, PT) sit-to-stand/stand-to-sit;bed-to-chair/chair-to-bed;toilet;wheelchair transfer;car transfer;other (see comments)  -AJ     [place] Level/Cues Needed (Transfer Goal 1, PT) standby assist;verbal cues required  -AJ     Time Frame (Transfer Goal 1, PT) long term goal (LTG);10 days  -AJ     Strategies/Barriers (Transfers Goal 1, PT) maintain sternal precuations  -AJ      Progress/Outcome (Transfer Goal 1, PT) good progress toward goal;continuing progress toward goal;goal ongoing  -       Row Name 06/27/25 1111          Gait Training Goal 1 (PT)    Activity/Assistive Device (Gait Training Goal 1, PT) gait (walking locomotion);decrease asymmetrical patterns;decrease fall risk;diminish gait deviation;forward stepping;improve balance and speed;increase endurance/gait distance;increase energy conservation;normalize weight shifts  -AJ     [place] Level (Gait Training Goal 1, PT) standby assist;contact guard required  -AJ     Distance (Gait Training Goal 1, PT) 500' with no pain and sternal precautions  -AJ     Time Frame (Gait Training Goal 1, PT) long term goal (LTG);10 days  -AJ     Progress/Outcome (Gait Training Goal 1, PT) goal revised this date  -       Row Name 06/27/25 1111          Stairs Goal 1 (PT)    Activity/Assistive Device (Stairs Goal 1, PT) ascending stairs;descending stairs;decrease fall risk;step-to-step;improve balance and speed  -AJ     [place] Level/Cues Needed (Stairs Goal 1, PT) standby assist  -AJ     Number of Stairs (Stairs Goal 1, PT) 3 as needed for home safety  -AJ     Time Frame (Stairs Goal 1, PT) long term goal (LTG);10 days  -AJ     Progress/Outcome (Stairs Goal 1, PT) continuing progress toward goal;progress slower than expected;goal ongoing  -       Row Name 06/27/25 1111          Therapy Assessment/Plan (PT)    Planned Therapy Interventions (PT) balance training;bed mobility training;gait training;home exercise program;patient/family education;transfer training;postural re-education;ROM (range of motion);stair training;strengthening;stretching  -AJ               User Key  (r) = Recorded By, (t) = Taken By, (c) = Cosigned By      Initials Name Provider Type    Andrew Perez, PT DPT Physical Therapist                   Clinical Impression       Row Name 06/27/25 1111          Pain    Pretreatment Pain Rating 0/10 - no pain  -AJ      Posttreatment Pain Rating 1/10  -     Pain Location flank;chest  -AJ     Pain Side/Orientation left  -     Pain Management Interventions exercise or physical activity utilized;nursing notified  -     Response to Pain Interventions activity participation with increased pain;activity participation with tolerable pain  -AJ       Row Name 06/27/25 1111          Plan of Care Review    Plan of Care Reviewed With patient;family  -     Progress improving  -     Outcome Evaluation PT re-eval complete for time of admission. Pt reports he is feeling improvement overall but is eager to return home soon. Pt HR prior to moiblity of 86 and up in the recliner, require verbal cueing to verbalize sternal precautions. Pt stood and ambulated length of hallway without standing rest break this date, totaling 420' Pt returned to recliner and performed ther ex to cool down, HR reading of 79. pt improving overall and goals updated to reflect progress. Anticipate return home when medically stable  -       Row Name 06/27/25 1111          Therapy Assessment/Plan (PT)    Patient/Family Therapy Goals Statement (PT) hurry and go home  -     Rehab Potential (PT) good  -AJ     Criteria for Skilled Interventions Met (PT) yes;meets criteria;skilled treatment is necessary  -     Therapy Frequency (PT) 2 times/day  -AJ     Predicted Duration of Therapy Intervention (PT) until d/c  -       Row Name 06/27/25 1111          Positioning and Restraints    Pre-Treatment Position sitting in chair/recliner  -     Post Treatment Position chair  -AJ     In Chair notified nsg;sitting;call light within reach;encouraged to call for assist;with family/caregiver  -               User Key  (r) = Recorded By, (t) = Taken By, (c) = Cosigned By      Initials Name Provider Type    Andrew Perez, PT DPT Physical Therapist                   Outcome Measures       Row Name 06/27/25 1111 06/27/25 0800       How much help from another person do you  currently need...    Turning from your back to your side while in flat bed without using bedrails? 4  -AJ 4  -MF    Moving from lying on back to sitting on the side of a flat bed without bedrails? 4  -AJ 4  -MF    Moving to and from a bed to a chair (including a wheelchair)? 4  -AJ 4  -MF    Standing up from a chair using your arms (e.g., wheelchair, bedside chair)? 3  -AJ 4  -MF    Climbing 3-5 steps with a railing? 3  -AJ 4  -MF    To walk in hospital room? 4  -AJ 4  -MF    AM-PAC 6 Clicks Score (PT) 22  -AJ 24  -    Highest Level of Mobility Goal Walk 25 Feet or More-7  -AJ Walk 250 Feet or More - 8  -      Row Name 06/27/25 1111          Functional Assessment    Outcome Measure Options AM-PAC 6 Clicks Basic Mobility (PT)  -               User Key  (r) = Recorded By, (t) = Taken By, (c) = Cosigned By      Initials Name Provider Type    Lotus Davila RN Registered Nurse    Andrew Perez, PT DPT Physical Therapist                                 Physical Therapy Education       Title: PT OT SLP Therapies (Done)       Topic: Physical Therapy (Done)       Point: Mobility training (Done)       Learning Progress Summary            Patient Acceptance, E, VU by SHAISTA at 6/27/2025 1310    Comment: role of PT, goal ongoing and progression    Acceptance, E,D, NR by KULWANT at 6/20/2025 1129    Comment: bed transfers    Acceptance, E, NR by GABRIELLA at 6/18/2025 1415    Comment: sternal prec, gait safety    Acceptance, E, VU,DU,NR by GABRIELLA at 6/18/2025 0800    Comment: benefits of activity, progression of PT, sternal prec    Acceptance, E, NR by SHAISTA at 6/17/2025 1438    Comment: continued sternal precautions, goal progression    Acceptance, E, VU by SHAISTA at 6/17/2025 1011    Comment: role of PT, sternal precvautions, progressive mobility and ambuakltion                      Point: Home exercise program (Done)       Learning Progress Summary            Patient Acceptance, E, VU by SHAISTA at 6/27/2025 1310    Comment: role of PT,  goal ongoing and progression    Acceptance, E, NR by SHAISTA at 6/17/2025 1438    Comment: continued sternal precautions, goal progression    Acceptance, E, VU by SHAISTA at 6/17/2025 1011    Comment: role of PT, sternal precvautions, progressive mobility and ambuakltion                      Point: Body mechanics (Done)       Learning Progress Summary            Patient Acceptance, E, VU by SHAISTA at 6/27/2025 1310    Comment: role of PT, goal ongoing and progression    Acceptance, E, NR by SHAISTA at 6/17/2025 1438    Comment: continued sternal precautions, goal progression    Acceptance, E, VU by AJ at 6/17/2025 1011    Comment: role of PT, sternal precvautions, progressive mobility and ambuakltion                      Point: Precautions (Done)       Learning Progress Summary            Patient Acceptance, E, VU by SHAISTA at 6/27/2025 1310    Comment: role of PT, goal ongoing and progression    Acceptance, E, VU,DU,NR by GABRIELLA at 6/18/2025 0800    Comment: benefits of activity, progression of PT, sternal prec    Acceptance, E, NR by SHAISTA at 6/17/2025 1438    Comment: continued sternal precautions, goal progression    Acceptance, E, VU by SHAISTA at 6/17/2025 1011    Comment: role of PT, sternal precvautions, progressive mobility and ambuakltion                                      User Key       Initials Effective Dates Name Provider Type Discipline    GABRIELLA 02/03/23 -  Dereck Farias, PT DPT Physical Therapist PT    KULWANT 02/03/23 -  Vale Mack, PTA Physical Therapist Assistant PT     08/15/24 -  Andrew Palomino, TANGELA DPT Physical Therapist PT                  PT Recommendation and Plan  Planned Therapy Interventions (PT): balance training, bed mobility training, gait training, home exercise program, patient/family education, transfer training, postural re-education, ROM (range of motion), stair training, strengthening, stretching  Progress: improving  Outcome Evaluation: PT re-eval complete for time of admission. Pt reports he is feeling  improvement overall but is eager to return home soon. Pt HR prior to moiblity of 86 and up in the recliner, require verbal cueing to verbalize sternal precautions. Pt stood and ambulated length of hallway without standing rest break this date, totaling 420' Pt returned to recliner and performed ther ex to cool down, HR reading of 79. pt improving overall and goals updated to reflect progress. Anticipate return home when medically stable     Time Calculation:         PT Charges       Row Name 06/27/25 1111             Time Calculation    Start Time 1111  -AJ      Stop Time 1135  +15 for chart review of old goals and attempted session but pt asked to come back later  -AJ      Time Calculation (min) 24 min  -AJ      PT Received On 06/27/25  -AJ      PT Goal Re-Cert Due Date 07/07/25  -AJ         Timed Charges    41366 - Gait Training Minutes  8  -AJ         Untimed Charges    PT Eval/Re-eval Minutes 30  -AJ         Total Minutes    Timed Charges Total Minutes 8  -AJ      Untimed Charges Total Minutes 30  -AJ       Total Minutes 38  -AJ                User Key  (r) = Recorded By, (t) = Taken By, (c) = Cosigned By      Initials Name Provider Type    Andrew Perez, PT DPT Physical Therapist                  Therapy Charges for Today       Code Description Service Date Service Provider Modifiers Qty    56551778835 HC GAIT TRAINING EA 15 MIN 6/27/2025 Andrew Palomino PT DPT GP 1    05037775315 HC PT RE-EVAL ESTABLISHED PLAN 2 6/27/2025 Andrew Palomino PT DPT GP 1            PT G-Codes  Outcome Measure Options: AM-PAC 6 Clicks Basic Mobility (PT)  AM-PAC 6 Clicks Score (PT): 22  PT Discharge Summary  Anticipated Discharge Disposition (PT): home with assist    Andrew Palomino PT DPT  6/27/2025

## 2025-06-27 NOTE — PLAN OF CARE
Goal Outcome Evaluation:  Plan of Care Reviewed With: patient, family        Progress: improving  Outcome Evaluation: PT re-eval complete for time of admission. Pt reports he is feeling improvement overall but is eager to return home soon. Pt HR prior to moiblity of 86 and up in the recliner, require verbal cueing to verbalize sternal precautions. Pt stood and ambulated length of hallway without standing rest break this date, totaling 420' Pt returned to recliner and performed ther ex to cool down, HR reading of 79. pt improving overall and goals updated to reflect progress. Anticipate return home when medically stable    Anticipated Discharge Disposition (PT): home with assist

## 2025-06-27 NOTE — PROGRESS NOTES
"Patient name: Linden Montelongo  Patient : 1945  VISIT # [id number]  MR #[id number]    Procedure:Procedure(s):  AORTIC VALVE REPLACEMENT 23MM, CORONARY ARTERY BYPASS GRAFTING X2, LEFT INTERNAL MAMMARY ARTERY GRAFT, RIGHT LEG ENDOSCOPIC VEIN HARVEST,  MAZE WITH ENCOMPASS CLAMP, CRYOABLATION, LEFT ATRIAL APPENDAGE EXCLUSION 40MM, TRANSESOPHAGEAL ECHOCARDIOGRAM  CORONARY ARTERY BYPASS GRAFTING  MAZE PROCEDURE WITH ENCOMPASS CLAMP  ATRIAL APPENDAGE EXCLUSION LEFT WITH TRANSESOPHAGEAL ECHOCARDIOGRAM  Procedure Date:2025  POD:11 Days Post-Op    Subjective     On room air.  Weight is down 2 pounds and he is 7 pounds below his baseline.  He is having bowel movements.  Walking 200 feet with physical therapy.  Converted to A-fib rate controlled this morning    Telemetry: A fib 90s  IV drips: None         Objective     Visit Vitals  /59 (BP Location: Right arm, Patient Position: Sitting)   Pulse 68   Temp 97.9 °F (36.6 °C) (Oral)   Resp 18   Ht 177 cm (69.69\")   Wt 105 kg (231 lb 3.2 oz)   SpO2 94%   BMI 33.47 kg/m²   Baseline weight 238 pounds    Intake/Output Summary (Last 24 hours) at 2025 0915  Last data filed at 2025 0800  Gross per 24 hour   Intake 960 ml   Output 2415 ml   Net -1455 ml     Left pleural drain: 450 mL in 24 hours, serosanguineous, no airleak    Lab:     CBC:  Results from last 7 days   Lab Units 25  04225  0334 25  0534   WBC 10*3/mm3 8.07 7.84 6.71   HEMATOCRIT % 33.0* 30.6* 30.9*   PLATELETS 10*3/mm3 312 325 280          BMP:  Results from last 7 days   Lab Units 25  0429 25  0334 25  0534   SODIUM mmol/L 133* 135* 134*   POTASSIUM mmol/L 3.9 3.9 4.5   CHLORIDE mmol/L 102 99 102   CO2 mmol/L 19.0* 23.0 19.0*   GLUCOSE mg/dL 103* 102* 104*   BUN mg/dL 13.4 14.8 14.2   CREATININE mg/dL 0.79 0.85 0.68*          COAG:  Results from last 7 days   Lab Units 25  2225   INR  0.96   APTT seconds 38.4*       IMAGES:       Imaging Results (Last  " Hours)       Procedure Component Value Units Date/Time    XR Chest 1 View [[id number]] Collected: 06/27/25 0742     Updated: 06/27/25 0747    Narrative:      EXAMINATION:  XR CHEST 1 VW-       HISTORY: Postop heart surgery.     COMPARISON: 6/26/2025.     TECHNIQUE: Single view AP image.     FINDINGS: A left chest tube remains in place. Trace left pneumothorax in  the lung apex. There is continued mild atelectasis in the left lung  base. There is mild blunting of the costophrenic angles. There is  cardiomegaly. Status post median sternotomy. No other acute bony  abnormality.          Impression:      1. Mild atelectasis left lung base, stable. Left chest tube remains in  place. Trace amount of pneumothorax left lung apex.  2. Minimal blunting of the costophrenic angles. Cardiomegaly.           This report was signed and finalized on 6/27/2025 7:43 AM by Dr. Pramod Grayson MD.             CXR: Left chest tube in stable position with small pleural effusions.  Bibasilar atelectasis.  Tiny left apical pneumothorax is stable.  Improved pulmonary vascular congestion    Physical Exam:  General: Alert, oriented. No apparent distress.   Cardiovascular: Regular rate and rhythm without murmur, rubs, or gallops.    Pulmonary: Clear to auscultation bilaterally without wheezing, rubs, or rales.  Chest: Sternotomy incision clean, dry, and intact. Sternum stable. No clicks.  Left chest tube to 20 cm suction. No air leak. Fluid is serosanguineous  Abdomen: Soft, nondistended, and nontender.  Extremities: Warm, moves all extremities. No edema. Saphenectomy site clean, dry, and intact  Neurologic:  Grossly intact with no focal deficits.            Impression:  Aortic valve stenosis  Coronary artery disease  Paroxysmal atrial fibrillation  Hyperlipidemia  Hypertension      Plan:  Chest tube output remains too high for removal.  Discussed with patient the possibility of chest tube removal with the understanding that pleural fluid may  return requiring outpatient thoracentesis versus proceeding with talc pleurodesis through chest tube at bedside.  I explained to patient that if we do proceed with talc, he will need to stay an additional 3 more days.  He would like to think about his options further and discussed with his family.  I will follow-up this afternoon with Dr. Mcneil to get patient's decision.  Routine postcardiac surgery care  Encourage pulmonary toilet ambulation  Discussed with patient and nursing      JUDE Urena  06/27/25  09:15 CDT

## 2025-06-27 NOTE — PLAN OF CARE
Goal Outcome Evaluation:  Plan of Care Reviewed With: patient        Progress: no change  Outcome Evaluation: Patient had talc procedure completed this shift. Medication administered by JUDE Urena. Patient remains on RA unless sleep then 2LNC placed for support. Pain controlled with scheduled tylenol. Ambulated with no difficulties to the 4c nursing desk and back. S 65-72 with brief time in rate controlled Afl per tele.

## 2025-06-27 NOTE — PLAN OF CARE
Problem: Adult Inpatient Plan of Care  Goal: Plan of Care Review  Outcome: Not Progressing  Flowsheets  Taken 6/27/2025 0423 by Lizz Will, RN  Outcome Evaluation: S 66-75.  VSS.  X1 bowel movement overnight.  No pain reported.  Safety maintained.  Plan of Care Reviewed With: patient  Progress: improving

## 2025-06-28 ENCOUNTER — APPOINTMENT (OUTPATIENT)
Dept: GENERAL RADIOLOGY | Facility: HOSPITAL | Age: 80
End: 2025-06-28
Payer: MEDICARE

## 2025-06-28 LAB
ANION GAP SERPL CALCULATED.3IONS-SCNC: 14 MMOL/L (ref 5–15)
BUN SERPL-MCNC: 11.3 MG/DL (ref 8–23)
BUN/CREAT SERPL: 15.3 (ref 7–25)
CALCIUM SPEC-SCNC: 8.2 MG/DL (ref 8.6–10.5)
CHLORIDE SERPL-SCNC: 101 MMOL/L (ref 98–107)
CO2 SERPL-SCNC: 17 MMOL/L (ref 22–29)
CREAT SERPL-MCNC: 0.74 MG/DL (ref 0.76–1.27)
DEPRECATED RDW RBC AUTO: 61 FL (ref 37–54)
EGFRCR SERPLBLD CKD-EPI 2021: 91.6 ML/MIN/1.73
ERYTHROCYTE [DISTWIDTH] IN BLOOD BY AUTOMATED COUNT: 16.9 % (ref 12.3–15.4)
GLUCOSE BLDC GLUCOMTR-MCNC: 109 MG/DL (ref 70–130)
GLUCOSE BLDC GLUCOMTR-MCNC: 110 MG/DL (ref 70–130)
GLUCOSE BLDC GLUCOMTR-MCNC: 111 MG/DL (ref 70–130)
GLUCOSE BLDC GLUCOMTR-MCNC: 133 MG/DL (ref 70–130)
GLUCOSE SERPL-MCNC: 100 MG/DL (ref 65–99)
HCT VFR BLD AUTO: 33.9 % (ref 37.5–51)
HGB BLD-MCNC: 10.2 G/DL (ref 13–17.7)
MCH RBC QN AUTO: 29.9 PG (ref 26.6–33)
MCHC RBC AUTO-ENTMCNC: 30.1 G/DL (ref 31.5–35.7)
MCV RBC AUTO: 99.4 FL (ref 79–97)
PLATELET # BLD AUTO: 326 10*3/MM3 (ref 140–450)
PMV BLD AUTO: 9.3 FL (ref 6–12)
POTASSIUM SERPL-SCNC: 4.4 MMOL/L (ref 3.5–5.2)
RBC # BLD AUTO: 3.41 10*6/MM3 (ref 4.14–5.8)
SODIUM SERPL-SCNC: 132 MMOL/L (ref 136–145)
WBC NRBC COR # BLD AUTO: 10.9 10*3/MM3 (ref 3.4–10.8)

## 2025-06-28 PROCEDURE — 80048 BASIC METABOLIC PNL TOTAL CA: CPT | Performed by: NURSE PRACTITIONER

## 2025-06-28 PROCEDURE — 97116 GAIT TRAINING THERAPY: CPT

## 2025-06-28 PROCEDURE — 71045 X-RAY EXAM CHEST 1 VIEW: CPT

## 2025-06-28 PROCEDURE — 85027 COMPLETE CBC AUTOMATED: CPT | Performed by: NURSE PRACTITIONER

## 2025-06-28 PROCEDURE — 25810000003 SODIUM CHLORIDE 0.9 % SOLUTION: Performed by: NURSE PRACTITIONER

## 2025-06-28 PROCEDURE — 82948 REAGENT STRIP/BLOOD GLUCOSE: CPT

## 2025-06-28 PROCEDURE — 25010000002 ENOXAPARIN PER 10 MG: Performed by: NURSE PRACTITIONER

## 2025-06-28 RX ADMIN — SODIUM CHLORIDE 500 ML: 9 INJECTION, SOLUTION INTRAVENOUS at 09:02

## 2025-06-28 RX ADMIN — BISACODYL 10 MG: 5 TABLET, COATED ORAL at 20:01

## 2025-06-28 RX ADMIN — POLYETHYLENE GLYCOL 3350 17 G: 17 POWDER, FOR SOLUTION ORAL at 09:03

## 2025-06-28 RX ADMIN — FAMOTIDINE 20 MG: 20 TABLET, FILM COATED ORAL at 09:03

## 2025-06-28 RX ADMIN — ACETAMINOPHEN 650 MG: 325 TABLET ORAL at 04:58

## 2025-06-28 RX ADMIN — ASPIRIN 81 MG: 81 TABLET, COATED ORAL at 09:03

## 2025-06-28 RX ADMIN — METOPROLOL TARTRATE 25 MG: 25 TABLET, FILM COATED ORAL at 20:01

## 2025-06-28 RX ADMIN — ATORVASTATIN CALCIUM 20 MG: 10 TABLET, FILM COATED ORAL at 20:01

## 2025-06-28 RX ADMIN — Medication 10 MG: at 20:01

## 2025-06-28 RX ADMIN — ENOXAPARIN SODIUM 40 MG: 100 INJECTION SUBCUTANEOUS at 09:03

## 2025-06-28 RX ADMIN — AMIODARONE HYDROCHLORIDE 200 MG: 200 TABLET ORAL at 09:03

## 2025-06-28 RX ADMIN — METOPROLOL TARTRATE 25 MG: 25 TABLET, FILM COATED ORAL at 09:03

## 2025-06-28 RX ADMIN — ACETAMINOPHEN 650 MG: 325 TABLET ORAL at 23:39

## 2025-06-28 NOTE — PROGRESS NOTES
"Patient name: Linden Montelongo  Patient : 1945  VISIT # [id number]  MR #[id number]    Procedure:Procedure(s):  AORTIC VALVE REPLACEMENT 23MM, CORONARY ARTERY BYPASS GRAFTING X2, LEFT INTERNAL MAMMARY ARTERY GRAFT, RIGHT LEG ENDOSCOPIC VEIN HARVEST,  MAZE WITH ENCOMPASS CLAMP, CRYOABLATION, LEFT ATRIAL APPENDAGE EXCLUSION 40MM, TRANSESOPHAGEAL ECHOCARDIOGRAM  CORONARY ARTERY BYPASS GRAFTING  MAZE PROCEDURE WITH ENCOMPASS CLAMP  ATRIAL APPENDAGE EXCLUSION LEFT WITH TRANSESOPHAGEAL ECHOCARDIOGRAM  Procedure Date:2025  POD:12 Days Post-Op    Subjective   The patient is resting in chair on room air.  The patient is day 1 status post bedside talc pleurodesis.  The patient is 2 pounds down from yesterday and 9 pounds below baseline weight. (+BM).  He is ambulating 420 feet with physical therapy.  CO2 has decreased to 17 this AM.     Telemetry: Sinus 60s-70s  IV drips: None       Objective     Visit Vitals  /63 (BP Location: Right arm, Patient Position: Lying)   Pulse 67   Temp 97.8 °F (36.6 °C) (Oral)   Resp 16   Ht 177 cm (69.69\")   Wt 104 kg (229 lb 6.4 oz)   SpO2 96%   BMI 33.21 kg/m²   Baseline weight 238 pounds    Intake/Output Summary (Last 24 hours) at 2025 1016  Last data filed at 2025 0447  Gross per 24 hour   Intake 120 ml   Output 1250 ml   Net -1130 ml     Left pleural drain: 80 mL in 24 hours, serosanguineous, no airleak    Lab:     CBC:  Results from last 7 days   Lab Units 25  0401 25  0429 25  0334   WBC 10*3/mm3 10.90* 8.07 7.84   HEMATOCRIT % 33.9* 33.0* 30.6*   PLATELETS 10*3/mm3 326 312 325          BMP:  Results from last 7 days   Lab Units 25  0401 25  0429 25  0334   SODIUM mmol/L 132* 133* 135*   POTASSIUM mmol/L 4.4 3.9 3.9   CHLORIDE mmol/L 101 102 99   CO2 mmol/L 17.0* 19.0* 23.0   GLUCOSE mg/dL 100* 103* 102*   BUN mg/dL 11.3 13.4 14.8   CREATININE mg/dL 0.74* 0.79 0.85          COAG:        Invalid input(s): \"PT\"      IMAGES:     "   Imaging Results (Last 24 Hours)       Procedure Component Value Units Date/Time    XR Chest 1 View [[id number]] Collected: 06/28/25 0724     Updated: 06/28/25 0728    Narrative:      EXAMINATION:  XR CHEST 1 VW-       HISTORY: Postop heart surgery.     COMPARISON: 6/27/2025.     TECHNIQUE: Single view AP image.     FINDINGS: There have been no tube changes. Left chest tube remains in  place. There is a small pneumothorax in the left lung apex. There is  mild blunting of the left costophrenic angle. There is mild atelectasis  in the left lower lobe. Heart size upper limits of normal. Status post  valve replacement. There is a left atrial appendage clamp. No other  acute bony abnormality is seen.          Impression:      Stable chest. Continued small left pleural effusion and  atelectasis left lung base. Small pneumothorax in the left lung apex.           This report was signed and finalized on 6/28/2025 7:25 AM by Dr. Pramod Grayson MD.             CXR: Left chest tube intact, small left apical pneumothorax, atelectasis.    Physical Exam:  General: Alert, oriented. No apparent distress.   Cardiovascular: Regular rate and rhythm without murmur, rubs, or gallops.    Pulmonary: Clear to auscultation bilaterally without wheezing, rubs, or rales.  Chest: Sternotomy incision clean, dry, and intact. Sternum stable. No clicks. Left chest tube to 20 cm suction. No air leak. Fluid is serosanguineous  Abdomen: Soft, nondistended, and nontender.  Extremities: Warm, moves all extremities. No edema. Saphenectomy site clean, dry, and intact  Neurologic:  Grossly intact with no focal deficits.          Impression:  Aortic valve stenosis  Coronary artery disease  Paroxysmal atrial fibrillation  Hyperlipidemia  Hypertension    Plan:  Normal saline 500 mL bolus x 1, continue to monitor CO2  Routine postcardiac surgery care  Encourage pulmonary toilet ambulation  Discussed with patient and nursing      Maryann Mohan,  APRN  06/28/25  10:16 CDT

## 2025-06-28 NOTE — PLAN OF CARE
Goal Outcome Evaluation:              Outcome Evaluation: VSS. S 65-72 per tele. no c/o pain this shift. pt walked to 4c nurses station this am and back to room with no breaks. call light within reach. safety maintained.

## 2025-06-28 NOTE — THERAPY TREATMENT NOTE
Acute Care - Physical Therapy Treatment Note  [facility]     Patient Name: Linden Montelongo  : 1945  MRN: [id number]  Today's Date: 2025      Visit Dx:     ICD-10-CM ICD-9-CM   1. Impaired mobility [Z74.09]  Z74.09 799.89   2. Coronary artery disease involving native coronary artery of native heart without angina pectoris  I25.10 414.01   3. Aortic valve stenosis  I35.0 424.1     Patient Active Problem List   Diagnosis    Coronary artery disease involving native coronary artery of native heart without angina pectoris    Aortic valve stenosis    Nonrheumatic aortic (valve) stenosis     Past Medical History:   Diagnosis Date    A-fib     Hyperlipidemia     Hypertension     LUKE on CPAP     Sleep apnea      Past Surgical History:   Procedure Laterality Date    AORTIC VALVE REPAIR/REPLACEMENT N/A 2025    Procedure: AORTIC VALVE REPLACEMENT 23MM, CORONARY ARTERY BYPASS GRAFTING X2, LEFT INTERNAL MAMMARY ARTERY GRAFT, RIGHT LEG ENDOSCOPIC VEIN HARVEST,  MAZE WITH ENCOMPASS CLAMP, CRYOABLATION, LEFT ATRIAL APPENDAGE EXCLUSION 40MM, TRANSESOPHAGEAL ECHOCARDIOGRAM;  Surgeon: Andrew Mcneil MD;  Location:  [facility];  Service: Cardiothoracic;  Laterality: N/A;    APPENDECTOMY      ATRIAL APPENDAGE EXCLUSION LEFT WITH TRANSESOPHAGEAL ECHOCARDIOGRAM N/A 2025    Procedure: ATRIAL APPENDAGE EXCLUSION LEFT WITH TRANSESOPHAGEAL ECHOCARDIOGRAM;  Surgeon: Andrew Mcneil MD;  Location:  [facility];  Service: Cardiothoracic;  Laterality: N/A;    CORONARY ARTERY BYPASS GRAFT N/A 2025    Procedure: CORONARY ARTERY BYPASS GRAFTING;  Surgeon: Andrew Mcneil MD;  Location:  [facility];  Service: Cardiothoracic;  Laterality: N/A;    ESOPHAGUS SURGERY      MAZE PROCEDURE N/A 2025    Procedure: MAZE PROCEDURE WITH ENCOMPASS CLAMP;  Surgeon: Andrew Mcneil MD;  Location:  [facility];  Service: Cardiothoracic;  Laterality: N/A;    REPLACEMENT TOTAL KNEE Right     TOTAL HIP ARTHROPLASTY Right      PT Assessment (Last 12  Hours)       PT Evaluation and Treatment       Row Name 06/28/25 0955          Physical Therapy Time and Intention    Subjective Information no complaints  -TB     Document Type therapy note (daily note)  -TB     Mode of Treatment physical therapy  -TB     Patient Effort good  -TB       Row Name 06/28/25 0955          General Information    Existing Precautions/Restrictions fall;sternal  chest tube  -TB       Row Name 06/28/25 0955          Pain    Pretreatment Pain Rating 0/10 - no pain  -TB     Posttreatment Pain Rating 0/10 - no pain  -TB       Row Name 06/28/25 0955          Bed Mobility    Comment, (Bed Mobility) Chair  -TB       Row Name 06/28/25 0955          Transfers    Transfers sit-stand transfer;stand-sit transfer  -TB       Row Name 06/28/25 0955          Sit-Stand Transfer    Sit-Stand [place] (Transfers) supervision;verbal cues  -TB       Row Name 06/28/25 0955          Stand-Sit Transfer    Stand-Sit [place] (Transfers) supervision  -TB       Row Name 06/28/25 0955          Gait/Stairs (Locomotion)    [place] Level (Gait) contact guard;standby assist  -TB     Distance in Feet (Gait) 425  -TB       Row Name 06/28/25 0955          Motor Skills    Comments, Therapeutic Exercise cardaic x15  -TB       Row Name             Wound 06/16/25 0817 sternal Surgical    Wound - Properties Group Placement Date: 06/16/25  - Placement Time: 0817  -LH Present on Original Admission: N  -LH Location: sternal  -LH Primary Wound Type: Surgical  -LH [facility]  [facility]    Retired Wound - Properties Group Placement Date: 06/16/25  - Placement Time: 0817  -LH Present on Original Admission: N  -LH Location: sternal  -LH [facility]  [facility]    Retired Wound - Properties Group Placement Date: 06/16/25  - Placement Time: 0817  - Present on Original Admission: N  -LH Location: sternal  -LH Additional Comments: [facility]  -    Retired Wound - Properties Group Date first assessed:  06/16/25  - Time first assessed: 0817  -LH Present on Original Admission: N  -LH Location: sternal  -LH Additional Comments: [facility]  -LH      Row Name             Wound 06/16/25 0817 Right anterior knee Surgical    Wound - Properties Group Placement Date: 06/16/25  -LH Placement Time: 0817  -LH Present on Original Admission: N  -LH Side: Right  -LH Orientation: anterior  -LH Location: knee  -LH Primary Wound Type: Surgical  -LH Additional Comments: mastisol, steri strips, mepilex, ace  -LH    Retired Wound - Properties Group Placement Date: 06/16/25 -LH Placement Time: 0817  -LH Present on Original Admission: N  -LH Side: Right  -LH Orientation: anterior  -LH Location: knee  -LH Additional Comments: mastisol, steri strips, mepilex, ace  -LH    Retired Wound - Properties Group Placement Date: 06/16/25 - Placement Time: 0817  -LH Present on Original Admission: N  -LH Side: Right  -LH Orientation: anterior  -LH Location: knee  -LH Additional Comments: mastisol, steri strips, mepilex, ace  -LH    Retired Wound - Properties Group Date first assessed: 06/16/25 - Time first assessed: 0817  -LH Present on Original Admission: N  -LH Side: Right  -LH Location: knee  -LH Additional Comments: mastisol, steri strips, mepilex, ace  -LH      Row Name             Wound 06/16/25 0817 Right anterior groin Surgical    Wound - Properties Group Placement Date: 06/16/25 -LH Placement Time: 0817  -LH Side: Right  -LH Orientation: anterior  -LH Location: groin  -LH Primary Wound Type: Surgical  -LH Additional Comments: mastisol, steri strips, mepilex, ace  -LH    Retired Wound - Properties Group Placement Date: 06/16/25 -LH Placement Time: 0817  -LH Side: Right  -LH Orientation: anterior  -LH Location: groin  -LH Additional Comments: mastisol, steri strips, mepilex, ace  -LH    Retired Wound - Properties Group Placement Date: 06/16/25 -LH Placement Time: 0817  -LH Side: Right  -LH Orientation: anterior  -LH Location: groin   - Additional Comments: mastisol, steri strips, mepilex, ace  -LH    Retired Wound - Properties Group Date first assessed: 06/16/25  - Time first assessed: 0817  - Side: Right  -LH Location: groin  -LH Additional Comments: mastisol, steri strips, mepilex, ace  -LH      Row Name 06/28/25 0955          Plan of Care Review    Plan of Care Reviewed With patient  -TB     Progress improving  -TB     Outcome Evaluation PT tx complete. Pt up in chair with no complaints. Cardaic exs x15. Stood SBA. Amb 425' total with CGA-SBA. No difficulty with amb. Will cont POC.  -TB       Row Name 06/28/25 0955          Positioning and Restraints    Pre-Treatment Position sitting in chair/recliner  -TB     Post Treatment Position chair  -TB     In Chair reclined;sitting;call light within reach;encouraged to call for assist;legs elevated  -TB               User Key  (r) = Recorded By, (t) = Taken By, (c) = Cosigned By      Initials Name Provider Type     Dania De La Rosa, RN Registered Nurse    Daina Reeder, PTA Physical Therapist Assistant                    Physical Therapy Education       Title: PT OT SLP Therapies (Done)       Topic: Physical Therapy (Done)       Point: Mobility training (Done)       Learning Progress Summary            Patient Acceptance, E, VU by SHAISTA at 6/27/2025 1310    Comment: role of PT, goal ongoing and progression    Acceptance, E,D, NR by KULWANT at 6/20/2025 1129    Comment: bed transfers    Acceptance, E, NR by GABRIELLA at 6/18/2025 1415    Comment: sternal prec, gait safety    Acceptance, E, VU,DU,NR by GABRIELLA at 6/18/2025 0800    Comment: benefits of activity, progression of PT, sternal prec    Acceptance, E, NR by SHAISTA at 6/17/2025 1438    Comment: continued sternal precautions, goal progression    Acceptance, E, VU by SHAISTA at 6/17/2025 1011    Comment: role of PT, sternal precvautions, progressive mobility and ambuakltion                      Point: Home exercise program (Done)       Learning Progress  Summary            Patient Acceptance, E, VU by SHAISTA at 6/27/2025 1310    Comment: role of PT, goal ongoing and progression    Acceptance, E, NR by SHAISTA at 6/17/2025 1438    Comment: continued sternal precautions, goal progression    Acceptance, E, VU by SHAISTA at 6/17/2025 1011    Comment: role of PT, sternal precvautions, progressive mobility and ambuakltion                      Point: Body mechanics (Done)       Learning Progress Summary            Patient Acceptance, E, VU by SHAISTA at 6/27/2025 1310    Comment: role of PT, goal ongoing and progression    Acceptance, E, NR by SHAISTA at 6/17/2025 1438    Comment: continued sternal precautions, goal progression    Acceptance, E, VU by SHAISTA at 6/17/2025 1011    Comment: role of PT, sternal precvautions, progressive mobility and ambuakltion                      Point: Precautions (Done)       Learning Progress Summary            Patient Acceptance, E, VU by SHAISTA at 6/27/2025 1310    Comment: role of PT, goal ongoing and progression    Acceptance, E, VU,DU,NR by GABRIELLA at 6/18/2025 0800    Comment: benefits of activity, progression of PT, sternal prec    Acceptance, E, NR by SHAISTA at 6/17/2025 1438    Comment: continued sternal precautions, goal progression    Acceptance, E, VU by SHAISTA at 6/17/2025 1011    Comment: role of PT, sternal precvautions, progressive mobility and ambuakltion                                      User Key       Initials Effective Dates Name Provider Type Discipline    GABRIELLA 02/03/23 -  Dereck Farias, PT DPT Physical Therapist PT    KULWANT 02/03/23 -  Vale Mack, PTA Physical Therapist Assistant PT     08/15/24 -  Andrew Palomino, TANEGLA DPT Physical Therapist PT                  PT Recommendation and Plan     Plan of Care Reviewed With: patient  Progress: improving  Outcome Evaluation: PT tx complete. Pt up in chair with no complaints. Cardaic exs x15. Stood SBA. Amb 425' total with CGA-SBA. No difficulty with amb. Will cont POC.       Time Calculation:    PT Charges        Row Name 06/28/25 1334             Time Calculation    Start Time 0955  -TB      Stop Time 1019  -TB      Time Calculation (min) 24 min  -TB      PT Received On 06/28/25  -TB         Time Calculation- PT    Total Timed Code Minutes- PT 24 minute(s)  -TB                User Key  (r) = Recorded By, (t) = Taken By, (c) = Cosigned By      Initials Name Provider Type    TB Daina Cheema PTA Physical Therapist Assistant                  Therapy Charges for Today       Code Description Service Date Service Provider Modifiers Qty    [id number] HC GAIT TRAINING EA 15 MIN 6/28/2025 Daina Cheema PTA GP 2            PT G-Codes  Outcome Measure Options: AM-PAC 6 Clicks Basic Mobility (PT)  AM-PAC 6 Clicks Score (PT): 24    Daina Cheema PTA  6/28/2025

## 2025-06-28 NOTE — PLAN OF CARE
Goal Outcome Evaluation:  Plan of Care Reviewed With: patient        Progress: no change  Outcome Evaluation: Patient had minimal reports of pain this shift. Decreased output from chest tube. Patient reports not eating well last several days, dietician consult placed.

## 2025-06-28 NOTE — PLAN OF CARE
Goal Outcome Evaluation:  Plan of Care Reviewed With: patient        Progress: improving  Outcome Evaluation: PT tx complete. Pt up in chair with no complaints. Cardaic exs x15. Stood SBA. Amb 425' total with CGA-SBA. No difficulty with amb. Will cont POC.

## 2025-06-29 ENCOUNTER — READMISSION MANAGEMENT (OUTPATIENT)
Dept: CALL CENTER | Facility: HOSPITAL | Age: 80
End: 2025-06-29
Payer: MEDICARE

## 2025-06-29 ENCOUNTER — APPOINTMENT (OUTPATIENT)
Dept: GENERAL RADIOLOGY | Facility: HOSPITAL | Age: 80
End: 2025-06-29
Payer: MEDICARE

## 2025-06-29 VITALS
HEIGHT: 70 IN | BODY MASS INDEX: 32.67 KG/M2 | RESPIRATION RATE: 16 BRPM | WEIGHT: 228.2 LBS | TEMPERATURE: 98 F | DIASTOLIC BLOOD PRESSURE: 66 MMHG | OXYGEN SATURATION: 95 % | HEART RATE: 69 BPM | SYSTOLIC BLOOD PRESSURE: 108 MMHG

## 2025-06-29 LAB
ANION GAP SERPL CALCULATED.3IONS-SCNC: 10 MMOL/L (ref 5–15)
BUN SERPL-MCNC: 10.3 MG/DL (ref 8–23)
BUN/CREAT SERPL: 12.3 (ref 7–25)
CALCIUM SPEC-SCNC: 8.3 MG/DL (ref 8.6–10.5)
CHLORIDE SERPL-SCNC: 103 MMOL/L (ref 98–107)
CO2 SERPL-SCNC: 22 MMOL/L (ref 22–29)
CREAT SERPL-MCNC: 0.84 MG/DL (ref 0.76–1.27)
DEPRECATED RDW RBC AUTO: 54.8 FL (ref 37–54)
EGFRCR SERPLBLD CKD-EPI 2021: 88.2 ML/MIN/1.73
ERYTHROCYTE [DISTWIDTH] IN BLOOD BY AUTOMATED COUNT: 16.6 % (ref 12.3–15.4)
GLUCOSE BLDC GLUCOMTR-MCNC: 105 MG/DL (ref 70–130)
GLUCOSE SERPL-MCNC: 106 MG/DL (ref 65–99)
HCT VFR BLD AUTO: 30.9 % (ref 37.5–51)
HGB BLD-MCNC: 9.8 G/DL (ref 13–17.7)
MCH RBC QN AUTO: 29.2 PG (ref 26.6–33)
MCHC RBC AUTO-ENTMCNC: 31.7 G/DL (ref 31.5–35.7)
MCV RBC AUTO: 92 FL (ref 79–97)
PLATELET # BLD AUTO: 391 10*3/MM3 (ref 140–450)
PMV BLD AUTO: 9.3 FL (ref 6–12)
POTASSIUM SERPL-SCNC: 3.8 MMOL/L (ref 3.5–5.2)
RBC # BLD AUTO: 3.36 10*6/MM3 (ref 4.14–5.8)
SODIUM SERPL-SCNC: 135 MMOL/L (ref 136–145)
WBC NRBC COR # BLD AUTO: 7.8 10*3/MM3 (ref 3.4–10.8)

## 2025-06-29 PROCEDURE — 85027 COMPLETE CBC AUTOMATED: CPT | Performed by: NURSE PRACTITIONER

## 2025-06-29 PROCEDURE — 71045 X-RAY EXAM CHEST 1 VIEW: CPT

## 2025-06-29 PROCEDURE — 82948 REAGENT STRIP/BLOOD GLUCOSE: CPT

## 2025-06-29 PROCEDURE — 25010000002 ENOXAPARIN PER 10 MG: Performed by: NURSE PRACTITIONER

## 2025-06-29 PROCEDURE — 80048 BASIC METABOLIC PNL TOTAL CA: CPT | Performed by: NURSE PRACTITIONER

## 2025-06-29 RX ORDER — ASPIRIN 81 MG/1
81 TABLET ORAL DAILY
Qty: 30 TABLET | Refills: 5 | Status: SHIPPED | OUTPATIENT
Start: 2025-06-29

## 2025-06-29 RX ADMIN — AMIODARONE HYDROCHLORIDE 200 MG: 200 TABLET ORAL at 09:47

## 2025-06-29 RX ADMIN — METOPROLOL TARTRATE 25 MG: 25 TABLET, FILM COATED ORAL at 09:47

## 2025-06-29 RX ADMIN — FAMOTIDINE 20 MG: 20 TABLET, FILM COATED ORAL at 09:47

## 2025-06-29 RX ADMIN — ENOXAPARIN SODIUM 40 MG: 100 INJECTION SUBCUTANEOUS at 09:48

## 2025-06-29 RX ADMIN — ACETAMINOPHEN 650 MG: 325 TABLET ORAL at 06:00

## 2025-06-29 RX ADMIN — ASPIRIN 81 MG: 81 TABLET, COATED ORAL at 09:47

## 2025-06-29 NOTE — OUTREACH NOTE
Prep Survey      Flowsheet Row Responses   [facility] facility patient discharged from? [facility]   Is LACE score < 7 ? No   Eligibility Readm Mgmt   Discharge diagnosis mpaired mobility  S/P CABG (coronary artery bypass graft)   Does the patient have one of the following disease processes/diagnoses(primary or secondary)? Cardiothoracic surgery   Does the patient have Home health ordered? No   Is there a DME ordered? No   Prep survey completed? Yes            AROLDO HAHN - Registered Nurse

## 2025-06-29 NOTE — PLAN OF CARE
Goal Outcome Evaluation:              Outcome Evaluation: VSS. S 64-71 per tele. no c/o pain noted this shift. minimal output from chest tube this shift. urinal at bedside. call light within reach. safety maintained

## 2025-06-29 NOTE — PROGRESS NOTES
"Patient name: Linden Montelongo  Patient : 1945  VISIT # [id number]  MR #[id number]    Procedure:Procedure(s):  AORTIC VALVE REPLACEMENT 23MM, CORONARY ARTERY BYPASS GRAFTING X2, LEFT INTERNAL MAMMARY ARTERY GRAFT, RIGHT LEG ENDOSCOPIC VEIN HARVEST,  MAZE WITH ENCOMPASS CLAMP, CRYOABLATION, LEFT ATRIAL APPENDAGE EXCLUSION 40MM, TRANSESOPHAGEAL ECHOCARDIOGRAM  CORONARY ARTERY BYPASS GRAFTING  MAZE PROCEDURE WITH ENCOMPASS CLAMP  ATRIAL APPENDAGE EXCLUSION LEFT WITH TRANSESOPHAGEAL ECHOCARDIOGRAM  Procedure Date:2025  POD:13 Days Post-Op    Subjective   The patient is resting in chair on room air.  He is eating breakfast. The patient is day 2 status post bedside talc pleurodesis.  The patient is 1 pound down from yesterday and 10 pounds below baseline weight. (+BM).  He is ambulating 425' with physical therapy.  CO2 has improved on AM labs.     Telemetry: Sinus 60s-70s  IV drips: None       Objective     Visit Vitals  /62   Pulse 71   Temp 98 °F (36.7 °C) (Oral)   Resp 16   Ht 177 cm (69.69\")   Wt 104 kg (228 lb 3.2 oz)   SpO2 95%   BMI 33.04 kg/m²   Baseline weight 238 pounds    Intake/Output Summary (Last 24 hours) at 2025 0845  Last data filed at 2025 0443  Gross per 24 hour   Intake 720 ml   Output 1495 ml   Net -775 ml     Left pleural drain:  70mL in 24 hours, serosanguineous, no airleak    Lab:     CBC:  Results from last 7 days   Lab Units 25  0319 25  0401 25  0429   WBC 10*3/mm3 7.80 10.90* 8.07   HEMATOCRIT % 30.9* 33.9* 33.0*   PLATELETS 10*3/mm3 391 326 312          BMP:  Results from last 7 days   Lab Units 25  03125  0401 25  0429   SODIUM mmol/L 135* 132* 133*   POTASSIUM mmol/L 3.8 4.4 3.9   CHLORIDE mmol/L 103 101 102   CO2 mmol/L 22.0 17.0* 19.0*   GLUCOSE mg/dL 106* 100* 103*   BUN mg/dL 10.3 11.3 13.4   CREATININE mg/dL 0.84 0.74* 0.79          COAG:        Invalid input(s): \"PT\"      IMAGES:       Imaging Results (Last 24 Hours)       " Procedure Component Value Units Date/Time    XR Chest 1 View [[id number]] Collected: 06/29/25 0758     Updated: 06/29/25 0802    Narrative:      EXAMINATION:  XR CHEST 1 VW-       HISTORY: Postop heart surgery.     COMPARISON: 6/28/2025.     TECHNIQUE: Single view AP image.     FINDINGS: Left chest tube remains in place. Trace amount of pneumothorax  in the left lung apex is stable. There is mild left lung volume loss.  There is atelectasis in the left lung base. The appearance is stable.  Heart size upper limits of normal. The patient had aortic valve  replacement. There is a left atrial appendage clamp. No other acute bony  abnormality is seen.          Impression:      1. Continued trace amount of pneumothorax left lung apex. Left chest  tube remains in place.  2. Stable atelectasis in the left lung base.           This report was signed and finalized on 6/29/2025 7:59 AM by Dr. Pramod Grayson MD.             CXR: Left chest tube intact, trace left apical pneumothorax, atelectasis.    Physical Exam:  General: Alert, oriented. No apparent distress.   Cardiovascular: Regular rate and rhythm without murmur, rubs, or gallops.    Pulmonary: Clear to auscultation bilaterally without wheezing, rubs, or rales.  Chest: Sternotomy incision clean, dry, and intact. Sternum stable. No clicks. Left chest tube to 20 cm suction. No air leak. Fluid is serosanguineous  Abdomen: Soft, nondistended, and nontender.  Extremities: Warm, moves all extremities. No edema. Saphenectomy site clean, dry, and intact  Neurologic:  Grossly intact with no focal deficits.          Impression:  Aortic valve stenosis  Coronary artery disease  Paroxysmal atrial fibrillation  Hyperlipidemia  Hypertension    Plan:  CO2 improved.   DC chest tube.   DC pacing wires.   DC home today.   F/U with JUDE Urena in 1 week with CXR.   Routine postcardiac surgery care.  Encourage pulmonary toilet ambulation.  Discussed with patient and  nursing.      Maryann Hough Marques, APRN  06/29/25  08:45 CDT

## 2025-06-29 NOTE — PAYOR COMM NOTE
"DC HOME 6-29-25    Linden Suárez (79 y.o. Male)       Date of Birth   1945    Social Security Number       Address   [address]  [facility] [address]    Home Phone   [phone]    MRN   [id number]       L[employer] day   Other    Marital Status                               Admission Date   6/16/2025    Admission Type   Elective    Admitting Provider   Mcneil, Andrew OCONNOR MD    Attending Provider       Department, Room/Bed   [facility] 4B, 433/1       Discharge Date   6/29/2025    Discharge Disposition   Home or Self Care    Discharge Destination   Home                              Attending Provider: (none)   Allergies: Zolpidem, Clarithromycin, Esomeprazole    Isolation: None   Infection: None   Code Status: CPR    Ht: 177 cm (69.69\")   Wt: 104 kg (228 lb 3.2 oz)    Admission Cmt: None   Principal Problem: Nonrheumatic aortic (valve) stenosis [I35.0]                   Active Insurance as of 6/16/2025       Primary Coverage       Payor Plan Insurance Group Employer/Plan Group    [employer] MEDICARE REPLACEMENT [employer] [id number]       Payor Plan Address Payor Plan Phone Number Payor Plan Fax Number Effective Dates    [address] [address] [phone]  1/1/2019 - None Entered    [facility] [id number]         Subscriber Name Subscriber Birth Date Member ID       LINDEN SUÁREZ 1945 [id number]                     Emergency Contacts        (Rel.) Home Phone Work Phone Mobile Phone    Summer Laguerre (Significant Other) -- -- [phone]    ranjanchi (Daughter) [phone] -- [phone]              Discharge Summary    No notes of this type exist for this encounter.       "

## 2025-06-30 NOTE — THERAPY DISCHARGE NOTE
Acute Care - Physical Therapy Discharge Summary  [facility]       Patient Name: Linden Montelongo  : 1945  MRN: [id number]    Today's Date: 2025                 Admit Date: 2025      PT Recommendation and Plan    Visit Dx:    ICD-10-CM ICD-9-CM   1. Impaired mobility [Z74.09]  Z74.09 799.89   2. Coronary artery disease involving native coronary artery of native heart without angina pectoris  I25.10 414.01   3. Aortic valve stenosis  I35.0 424.1   4. S/P CABG (coronary artery bypass graft)  Z95.1 V45.81                PT Rehab Goals       Row Name 25 1000             Bed Mobility Goal 1 (PT)    Activity/Assistive Device (Bed Mobility Goal 1, PT) bed mobility activities, all;rolling to left;rolling to right;supine to sit;sit to supine  -AB      [place] Level/Cues Needed (Bed Mobility Goal 1, PT) independent  -AB      Time Frame (Bed Mobility Goal 1, PT) long term goal (LTG);10 days  -AB      Strategies/Barriers (Bed Mobility Goal 1, PT) maintain sternal precuations  -AB      Progress/Outcomes (Bed Mobility Goal 1, PT) goal not met  -AB         Transfer Goal 1 (PT)    Activity/Assistive Device (Transfer Goal 1, PT) sit-to-stand/stand-to-sit;bed-to-chair/chair-to-bed;toilet;wheelchair transfer;car transfer;other (see comments)  -AB      [place] Level/Cues Needed (Transfer Goal 1, PT) standby assist;verbal cues required  -AB      Time Frame (Transfer Goal 1, PT) long term goal (LTG);10 days  -AB      Strategies/Barriers (Transfers Goal 1, PT) maintain sternal precuations  -AB      Progress/Outcome (Transfer Goal 1, PT) goal partially met  -AB         Gait Training Goal 1 (PT)    Activity/Assistive Device (Gait Training Goal 1, PT) gait (walking locomotion);decrease asymmetrical patterns;decrease fall risk;diminish gait deviation;forward stepping;improve balance and speed;increase endurance/gait distance;increase energy conservation;normalize weight shifts  -AB      [place] Level (Gait  Training Goal 1, PT) standby assist;contact guard required  -AB      Distance (Gait Training Goal 1, PT) 500' with no pain and sternal precautions  -AB      Time Frame (Gait Training Goal 1, PT) long term goal (LTG);10 days  -AB      Progress/Outcome (Gait Training Goal 1, PT) goal partially met  -AB         Stairs Goal 1 (PT)    Activity/Assistive Device (Stairs Goal 1, PT) ascending stairs;descending stairs;decrease fall risk;step-to-step;improve balance and speed  -AB      [place] Level/Cues Needed (Stairs Goal 1, PT) standby assist  -AB      Number of Stairs (Stairs Goal 1, PT) 3 as needed for home safety  -AB      Time Frame (Stairs Goal 1, PT) long term goal (LTG);10 days  -AB      Progress/Outcome (Stairs Goal 1, PT) goal not met  -AB                User Key  (r) = Recorded By, (t) = Taken By, (c) = Cosigned By      Initials Name Provider Type Discipline    July Barry, PTA Physical Therapist Assistant PT                        PT Discharge Summary  Anticipated Discharge Disposition (PT): home with assist  Reason for Discharge: Discharge from facility  Outcomes Achieved: Refer to plan of care for updates on goals achieved  Discharge Destination: Home with assist      July Griffin PTA   6/30/2025

## 2025-07-03 ENCOUNTER — READMISSION MANAGEMENT (OUTPATIENT)
Dept: CALL CENTER | Facility: HOSPITAL | Age: 80
End: 2025-07-03
Payer: MEDICARE

## 2025-07-03 NOTE — OUTREACH NOTE
CT Surgery Week 1 Survey      Flowsheet Row Responses   [facility] patient discharged from? [place]   Does the patient have one of the following disease processes/diagnoses(primary or secondary)? Cardiothoracic surgery   Week 1 attempt successful? Yes   Call start time 1027   Call end time 1036   Discharge diagnosis mpaired mobility  S/P CABG (coronary artery bypass graft)   Meds reviewed with patient/caregiver? Yes   Is the patient having any side effects they believe may be caused by any medication additions or changes? No   Does the patient have all medications related to this admission filled (includes all antibiotics, pain medications, cardiac medications, etc.) Yes   Is the patient taking all medications as directed (includes completed medication regime)? Yes   Does the patient have a primary care provider?  Yes   Does the patient have an appointment scheduled with their C/T surgeon? Yes  [7/7/25]   Has the patient kept scheduled appointments due by today? N/A   Has home health visited the patient within 72 hours of discharge? N/A   Psychosocial issues? No   Comments Pt monitors b/p daily, stays around 118/67's   What is the patient's perception of their health status since discharge? Same   Nursing interventions Nurse provided patient education   Is the patient/caregiver able to teach back normal signs of recovery? Pain or discomfort at incisional site, Depression or irritability, Nausea and lack of appetite   Nursing interventions Reassured on normal signs of recovery   Is the patient /caregiver able to teach back basic post-op care? Hold pillow to support chest when coughing, Shower daily, No tub bath, swimming, or hot tub until instructed by MD, Lifting as instructed by MD in discharge instructions, Use a clean wash cloth and antibacterial bar or liquid soap to clean incisions, Practice cough and deep breath every 4 hours while awake   Is the patient/caregiver able to teach back signs and symptoms of  incisional infection? Increased redness, swelling or pain at the incisonal site, Increased drainage or bleeding   Is the patient/caregiver able to teach back steps to recovery at home? Set small, achievable goals for return to baseline health   Is the patient/caregiver able to teach back the hierarchy of who to call/visit for symptoms/problems? PCP, Specialist, Home health nurse, Urgent Care, ED, 911 Yes   Week 1 call completed? Yes   Is the patient interested in additional calls from an ambulatory ? No   Would this patient benefit from a Referral to [facility] Social Work? No   Call end time 1036              Bibi IRWIN - Registered Nurse

## 2025-07-07 ENCOUNTER — OFFICE VISIT (OUTPATIENT)
Dept: CARDIAC SURGERY | Facility: CLINIC | Age: 80
End: 2025-07-07
Payer: MEDICARE

## 2025-07-07 ENCOUNTER — LAB (OUTPATIENT)
Dept: LAB | Facility: HOSPITAL | Age: 80
End: 2025-07-07
Payer: MEDICARE

## 2025-07-07 ENCOUNTER — HOSPITAL ENCOUNTER (OUTPATIENT)
Dept: GENERAL RADIOLOGY | Facility: HOSPITAL | Age: 80
Discharge: HOME | End: 2025-07-07
Payer: MEDICARE

## 2025-07-07 VITALS
WEIGHT: 233 LBS | HEART RATE: 68 BPM | SYSTOLIC BLOOD PRESSURE: 107 MMHG | DIASTOLIC BLOOD PRESSURE: 61 MMHG | BODY MASS INDEX: 33.36 KG/M2 | HEIGHT: 70 IN | OXYGEN SATURATION: 97 %

## 2025-07-07 DIAGNOSIS — Z95.1 S/P CABG (CORONARY ARTERY BYPASS GRAFT): ICD-10-CM

## 2025-07-07 DIAGNOSIS — I25.10 CORONARY ARTERY DISEASE INVOLVING NATIVE CORONARY ARTERY OF NATIVE HEART WITHOUT ANGINA PECTORIS: ICD-10-CM

## 2025-07-07 DIAGNOSIS — I48.0 PAROXYSMAL ATRIAL FIBRILLATION: ICD-10-CM

## 2025-07-07 DIAGNOSIS — I25.10 CORONARY ARTERY DISEASE INVOLVING NATIVE CORONARY ARTERY OF NATIVE HEART WITHOUT ANGINA PECTORIS: Primary | ICD-10-CM

## 2025-07-07 DIAGNOSIS — I35.0 NONRHEUMATIC AORTIC (VALVE) STENOSIS: ICD-10-CM

## 2025-07-07 LAB
ANION GAP SERPL CALCULATED.3IONS-SCNC: 11 MMOL/L (ref 5–15)
BASOPHILS # BLD AUTO: 0.04 10*3/MM3 (ref 0–0.2)
BASOPHILS NFR BLD AUTO: 0.6 % (ref 0–1.5)
BUN SERPL-MCNC: 13.9 MG/DL (ref 8–23)
BUN/CREAT SERPL: 11 (ref 7–25)
CALCIUM SPEC-SCNC: 8.8 MG/DL (ref 8.6–10.5)
CHLORIDE SERPL-SCNC: 102 MMOL/L (ref 98–107)
CO2 SERPL-SCNC: 23 MMOL/L (ref 22–29)
CREAT SERPL-MCNC: 1.26 MG/DL (ref 0.76–1.27)
DEPRECATED RDW RBC AUTO: 54.9 FL (ref 37–54)
EGFRCR SERPLBLD CKD-EPI 2021: 57.7 ML/MIN/1.73
EOSINOPHIL # BLD AUTO: 0.23 10*3/MM3 (ref 0–0.4)
EOSINOPHIL NFR BLD AUTO: 3.3 % (ref 0.3–6.2)
ERYTHROCYTE [DISTWIDTH] IN BLOOD BY AUTOMATED COUNT: 16 % (ref 12.3–15.4)
GLUCOSE SERPL-MCNC: 106 MG/DL (ref 65–99)
HCT VFR BLD AUTO: 33.1 % (ref 37.5–51)
HGB BLD-MCNC: 10.4 G/DL (ref 13–17.7)
IMM GRANULOCYTES # BLD AUTO: 0.04 10*3/MM3 (ref 0–0.05)
IMM GRANULOCYTES NFR BLD AUTO: 0.6 % (ref 0–0.5)
LYMPHOCYTES # BLD AUTO: 1.04 10*3/MM3 (ref 0.7–3.1)
LYMPHOCYTES NFR BLD AUTO: 14.9 % (ref 19.6–45.3)
MCH RBC QN AUTO: 29.3 PG (ref 26.6–33)
MCHC RBC AUTO-ENTMCNC: 31.4 G/DL (ref 31.5–35.7)
MCV RBC AUTO: 93.2 FL (ref 79–97)
MONOCYTES # BLD AUTO: 0.47 10*3/MM3 (ref 0.1–0.9)
MONOCYTES NFR BLD AUTO: 6.7 % (ref 5–12)
NEUTROPHILS NFR BLD AUTO: 5.16 10*3/MM3 (ref 1.7–7)
NEUTROPHILS NFR BLD AUTO: 73.9 % (ref 42.7–76)
NRBC BLD AUTO-RTO: 0 /100 WBC (ref 0–0.2)
PLATELET # BLD AUTO: 327 10*3/MM3 (ref 140–450)
PMV BLD AUTO: 9.5 FL (ref 6–12)
POTASSIUM SERPL-SCNC: 4.1 MMOL/L (ref 3.5–5.2)
RBC # BLD AUTO: 3.55 10*6/MM3 (ref 4.14–5.8)
SODIUM SERPL-SCNC: 136 MMOL/L (ref 136–145)
WBC NRBC COR # BLD AUTO: 6.98 10*3/MM3 (ref 3.4–10.8)

## 2025-07-07 PROCEDURE — 1160F RVW MEDS BY RX/DR IN RCRD: CPT | Performed by: NURSE PRACTITIONER

## 2025-07-07 PROCEDURE — 80048 BASIC METABOLIC PNL TOTAL CA: CPT

## 2025-07-07 PROCEDURE — 71046 X-RAY EXAM CHEST 2 VIEWS: CPT

## 2025-07-07 PROCEDURE — 85025 COMPLETE CBC W/AUTO DIFF WBC: CPT

## 2025-07-07 PROCEDURE — 1159F MED LIST DOCD IN RCRD: CPT | Performed by: NURSE PRACTITIONER

## 2025-07-07 PROCEDURE — 99024 POSTOP FOLLOW-UP VISIT: CPT | Performed by: NURSE PRACTITIONER

## 2025-07-07 PROCEDURE — 36415 COLL VENOUS BLD VENIPUNCTURE: CPT

## 2025-07-07 NOTE — PROGRESS NOTES
Subjective   Chief Complaint   Patient presents with    Post-op Follow-up     Patient had AVR/CABG x 2 on 6/16. CXR today.       Patient ID: Linden Montelongo is a 79 y.o. male who is here for follow-up having had VR (23mm Inspiris), CABG x3 (LIMA to LAD, SVG to Diagonal Artery), Lifted Left Atrial MAZE with Encompss, LAAE with 40mm Atriclip by Dr. Mcneil on 6/16/2025     History of Present Illness  Mr. Montelongo is an 79-year-old  male who underwent the above listed procedure by Dr. Mcneil on 6/16/2025.  Postoperative recovery was significant for left pleural tube drainage.  Outputs remained elevated and he underwent talc pleurodesis through existing chest tube at the bedside on postop day 11.  Output significantly decreased, chest tube was removed and he was discharged home on postop day 13.  He is here today for routine 1 week postop follow-up visit with chest x-ray.  He was not discharged home with Lasix as weight was 10 pounds below his baseline.  He resumed aspirin and Xarelto.  He continues on amiodarone 200 mg daily.  He is here today for routine 1 week postop follow-up visit with repeat chest x-ray.  Weight today is down 5 additional pounds but he reports significant worsening in lower extremity edema.  He also reports shortness of breath and dizziness especially with ambulation.  He does state he has not been able to walk as well as he should due to dizziness.  Systolic blood pressure at home has been in the low 100s.      The following portions of the patient's history were reviewed and updated as appropriate: allergies, current medications, past family history, past medical history, past social history, past surgical history and problem list.    Review of Systems   Constitutional:  Negative for chills, diaphoresis, fatigue and fever.   HENT:  Negative for trouble swallowing and voice change.    Eyes:  Negative for visual disturbance.   Respiratory:  Positive for shortness of breath. Negative for chest tightness.   "  Cardiovascular:  Positive for leg swelling. Negative for chest pain and palpitations.   Gastrointestinal:  Negative for abdominal pain, diarrhea, nausea and vomiting.   Musculoskeletal:  Negative for arthralgias and myalgias.   Skin:  Negative for color change, pallor, rash and wound.   Neurological:  Negative for dizziness, syncope and light-headedness.   Psychiatric/Behavioral:  Negative for agitation, confusion and sleep disturbance.        Objective   Visit Vitals  /61   Pulse 68   Ht 177 cm (69.69\")   Wt 106 kg (233 lb)   SpO2 97%   BMI 33.73 kg/m²       Physical Exam  Vitals reviewed.   Constitutional:       General: He is not in acute distress.  HENT:      Head: Normocephalic.   Eyes:      Pupils: Pupils are equal, round, and reactive to light.   Cardiovascular:      Rate and Rhythm: Normal rate and regular rhythm.      Heart sounds: Normal heart sounds. No murmur heard.  Pulmonary:      Breath sounds: Normal breath sounds. No wheezing or rales.   Abdominal:      General: There is no distension.      Palpations: Abdomen is soft.      Tenderness: There is no abdominal tenderness.   Musculoskeletal:         General: Swelling present. No tenderness.      Right lower leg: Edema (3+) present.      Left lower leg: Edema (3+) present.   Skin:     General: Skin is warm and dry.      Comments: Sternum stable, no clicks.  Sternal incision is clean dry and intact and healing nicely.  Saphenectomy site is clean dry and intact.   Neurological:      General: No focal deficit present.      Mental Status: He is alert and oriented to person, place, and time.   Psychiatric:         Mood and Affect: Mood normal.         Behavior: Behavior normal.         Thought Content: Thought content normal.         Judgment: Judgment normal.             Assessment & Plan     Independent Review of Radiographic Studies:    CXR: No pneumothorax.  Trace left basilar pleural effusion.    Diagnoses and all orders for this visit:    1. " Coronary artery disease involving native coronary artery of native heart without angina pectoris (Primary)  -     CBC & Differential; Future  -     Basic Metabolic Panel; Future    2. Nonrheumatic aortic (valve) stenosis  -     Adult Transthoracic Echo Complete W/ Cont if Necessary Per Protocol; Future           Overall, Linden Montelongo is doing well.  Chest x-ray today is stable.  We discussed dizziness and shortness of breath.  I have advised him to use compression stockings at home and keep lower extremities elevated at all times when sitting.  Check labs today but will likely plan to resume Lasix 20 mg daily until follow-up with me next week.  Weigh daily at home.  Decrease metoprolol to 12.5 mg daily.  Increase ambulation as tolerated.  We discussed current sternotomy precautions and how these will not be advanced yet. Following post op cardiac surgery home instructions. Provided support and encouragement. All questions have been answered to the best of my ability. Will discuss starting cardiac rehabilitation at official 1 month post op visit. Patient has follow up with Dr. Mcneil in a few weeks and a repeat transthoracic echo will be arranged prior to this appointment for new postop baseline.  We will also plan on Holter placement 2 weeks prior to his appointment with Dr. Mcneil to assess A-fib burden following maze procedure.  Patient has not yet had follow-up with cardiology and have advised him to call Dr. Land's office today to schedule follow-up.  Patient has been instructed to contact our office with any questions or concerns should they arise prior to the next office visit.  I will see him back in 1 week for recheck.    Linden Montelongo is a non-smoker and therefore does not need tobacco cessation education/counseling.      Advance Care Planning   ACP discussion was held with the patient during this visit. Patient does not have an advance directive, declines further assistance.

## 2025-07-11 NOTE — PROGRESS NOTES
Subjective   Chief Complaint   Patient presents with    Post-op Follow-up     Patient had AVR/CABG x 2 on 6/16       Patient ID: Linden Montelongo is a 79 y.o. male who is here for follow-up having had VR (23mm Inspiris), CABG x3 (LIMA to LAD, SVG to Diagonal Artery), Lifted Left Atrial MAZE with Encompss, LAAE with 40mm Atriclip by Dr. Mcneil on 6/16/2025     History of Present Illness  Mr. Montelongo is an 79-year-old  male who underwent the above listed procedure by Dr. Mcneil on 6/16/2025.  Postoperative recovery was significant for left pleural tube drainage.  Outputs remained elevated and he underwent talc pleurodesis through existing chest tube at the bedside on postop day 11.  Output significantly decreased, chest tube was removed and he was discharged home on postop day 13.  He was not discharged home with Lasix as weight was 10 pounds below his baseline.  He resumed aspirin and Xarelto.  He continues on amiodarone 200 mg daily.  Creatinine at office visit last week was 1.2 and he had significant lower extremity edema.  He was advised Lasix 20 mg daily in addition to compression stockings to BLE.  Metoprolol was also decreased to 12.5 mg twice daily as he was having issues with dizziness with position change and ambulation.  He was advised to keep extremities elevated at all times when sitting.  He is here today for recheck.  Weight today is down 2 pounds since last week. Walking approximately 15 minutes at a time.  He does have ongoing dizziness with position change.  Lower extremity edema continues.  He is wearing compression stockings today but admits he does not keep his legs elevated at home.  He does report lower extremity edema  is significantly improved in the mornings when he wakes up but worsens throughout the day.  Shortness of breath is ongoing, no worse since evaluation last week.    The following portions of the patient's history were reviewed and updated as appropriate: allergies, current medications, past  "family history, past medical history, past social history, past surgical history and problem list.    Review of Systems   Constitutional:  Negative for chills, diaphoresis, fatigue and fever.   HENT:  Negative for trouble swallowing and voice change.    Eyes:  Negative for visual disturbance.   Respiratory:  Positive for shortness of breath. Negative for chest tightness.    Cardiovascular:  Positive for leg swelling. Negative for chest pain and palpitations.   Gastrointestinal:  Negative for abdominal pain, diarrhea, nausea and vomiting.   Musculoskeletal:  Negative for arthralgias and myalgias.   Skin:  Negative for color change, pallor, rash and wound.   Neurological:  Positive for dizziness. Negative for syncope and light-headedness.   Psychiatric/Behavioral:  Negative for agitation, confusion and sleep disturbance.        Objective   Visit Vitals  /85   Pulse 76   Ht 177 cm (69.69\")   Wt 105 kg (231 lb 6.4 oz)   SpO2 97%   BMI 33.50 kg/m²         Physical Exam  Vitals reviewed.   Constitutional:       General: He is not in acute distress.  HENT:      Head: Normocephalic.   Eyes:      Pupils: Pupils are equal, round, and reactive to light.   Cardiovascular:      Rate and Rhythm: Normal rate and regular rhythm.      Heart sounds: Normal heart sounds. No murmur heard.  Pulmonary:      Breath sounds: Normal breath sounds. No wheezing or rales.   Abdominal:      General: There is no distension.      Palpations: Abdomen is soft.      Tenderness: There is no abdominal tenderness.   Musculoskeletal:         General: Swelling present. No tenderness.      Right lower leg: Edema (2+) present.      Left lower leg: Edema (2+) present.   Skin:     General: Skin is warm and dry.      Comments: Sternum stable, no clicks.  Sternal incision is clean dry and intact and healing nicely.  Saphenectomy site is clean dry and intact.   Neurological:      General: No focal deficit present.      Mental Status: He is alert and " oriented to person, place, and time.   Psychiatric:         Mood and Affect: Mood normal.         Behavior: Behavior normal.         Thought Content: Thought content normal.         Judgment: Judgment normal.             Assessment & Plan       CXR: Significant improvement in bilateral pleural effusions, nearly resolved.  No pneumothorax.      Diagnoses and all orders for this visit:    1. Coronary artery disease involving native coronary artery of native heart without angina pectoris (Primary)  -     CBC & Differential; Future  -     Basic Metabolic Panel; Future  -     XR Chest 2 View; Future    2. Nonrheumatic aortic (valve) stenosis  -     CBC & Differential; Future  -     Basic Metabolic Panel; Future  -     XR Chest 2 View; Future           Overall, Linden Montelongo is doing some better.  Chest x-ray today is much better, pleural effusions are nearly resolved.  Lower extremity edema is somewhat improved but ongoing.  We discussed the importance of continuing to use compression stockings and keeping lower extremities elevated at all times when sitting.  For now, will transition to only taking Lasix on an as-needed basis for weight gain of 3 pounds in a day or 5 pounds over 2 days.  He verbalizes understanding to this.  Continue metoprolol at 12.5 mg twice daily.  Increase ambulation as tolerated and he is walking better today than he was at office visit last week..  We discussed current sternotomy precautions and how these will not be advanced yet. Following post op cardiac surgery home instructions. Provided support and encouragement. All questions have been answered to the best of my ability. Will discuss starting cardiac rehabilitation at official 1 month post op visit. Patient has follow up with Dr. Mcneil in a few weeks and a repeat transthoracic echo will be arranged prior to this appointment for new postop baseline.  We will also plan on Holter placement 2 weeks prior to his appointment with Dr. Mcneil to assess  A-fib burden following maze procedure.  Patient has been instructed to contact our office with any questions or concerns should they arise prior to the next office visit.     Linden Montelongo is a non-smoker and therefore does not need tobacco cessation education/counseling.      Advance Care Planning   ACP discussion was held with the patient during this visit. Patient does not have an advance directive, declines further assistance.

## 2025-07-14 ENCOUNTER — OFFICE VISIT (OUTPATIENT)
Dept: CARDIAC SURGERY | Facility: CLINIC | Age: 80
End: 2025-07-14
Payer: MEDICARE

## 2025-07-14 ENCOUNTER — HOSPITAL ENCOUNTER (OUTPATIENT)
Dept: GENERAL RADIOLOGY | Facility: HOSPITAL | Age: 80
Discharge: HOME | End: 2025-07-14
Admitting: NURSE PRACTITIONER
Payer: MEDICARE

## 2025-07-14 VITALS
HEART RATE: 76 BPM | BODY MASS INDEX: 33.13 KG/M2 | HEIGHT: 70 IN | WEIGHT: 231.4 LBS | OXYGEN SATURATION: 97 % | DIASTOLIC BLOOD PRESSURE: 85 MMHG | SYSTOLIC BLOOD PRESSURE: 141 MMHG

## 2025-07-14 DIAGNOSIS — I25.10 CORONARY ARTERY DISEASE INVOLVING NATIVE CORONARY ARTERY OF NATIVE HEART WITHOUT ANGINA PECTORIS: ICD-10-CM

## 2025-07-14 DIAGNOSIS — I35.0 NONRHEUMATIC AORTIC (VALVE) STENOSIS: ICD-10-CM

## 2025-07-14 DIAGNOSIS — I25.10 CORONARY ARTERY DISEASE INVOLVING NATIVE CORONARY ARTERY OF NATIVE HEART WITHOUT ANGINA PECTORIS: Primary | ICD-10-CM

## 2025-07-14 PROCEDURE — 1159F MED LIST DOCD IN RCRD: CPT | Performed by: NURSE PRACTITIONER

## 2025-07-14 PROCEDURE — 99024 POSTOP FOLLOW-UP VISIT: CPT | Performed by: NURSE PRACTITIONER

## 2025-07-14 PROCEDURE — 71046 X-RAY EXAM CHEST 2 VIEWS: CPT

## 2025-07-14 PROCEDURE — 1160F RVW MEDS BY RX/DR IN RCRD: CPT | Performed by: NURSE PRACTITIONER

## 2025-07-17 ENCOUNTER — TELEPHONE (OUTPATIENT)
Dept: CARDIAC SURGERY | Facility: CLINIC | Age: 80
End: 2025-07-17
Payer: MEDICARE

## 2025-07-17 ENCOUNTER — READMISSION MANAGEMENT (OUTPATIENT)
Dept: CALL CENTER | Facility: HOSPITAL | Age: 80
End: 2025-07-17
Payer: MEDICARE

## 2025-07-17 NOTE — OUTREACH NOTE
CT Surgery Week 3 Survey      Flowsheet Row Responses   [facility] patient discharged from? [place]   Does the patient have one of the following disease processes/diagnoses(primary or secondary)? Cardiothoracic surgery   Week 3 attempt successful? Yes   Call start time 0810   Call end time 0812   Discharge diagnosis mpaired mobility  S/P CABG (coronary artery bypass graft)   Meds reviewed with patient/caregiver? Yes   Is the patient taking all medications as directed (includes completed medication regime)? Yes   Does the patient have a primary care provider?  Yes   Does the patient have an appointment scheduled with their C/T surgeon? Yes   Has the patient kept scheduled appointments due by today? Yes   Has home health visited the patient within 72 hours of discharge? N/A   Psychosocial issues? No   Did the patient receive a copy of their discharge instructions? Yes   Nursing interventions Reviewed instructions with patient   What is the patient's perception of their health status since discharge? Improving   Nursing interventions Nurse provided patient education   Is the patient /caregiver able to teach back basic post-op care? Lifting as instructed by MD in discharge instructions, No tub bath, swimming, or hot tub until instructed by MD, Shower daily, Drive as instructed by MD in discharge instructions   Is the patient/caregiver able to teach back signs and symptoms of incisional infection? Increased redness, swelling or pain at the incisonal site, Increased drainage or bleeding, Incisional warmth, Pus or odor from incision, Fever   Is the patient/caregiver able to teach back steps to recovery at home? Set small, achievable goals for return to baseline health, Eat a well-balance diet   Is the patient/caregiver able to teach back the hierarchy of who to call/visit for symptoms/problems? PCP, Specialist, Home health nurse, Urgent Care, ED, 911 Yes   Week 3 call completed? Yes   Graduated Yes    Graduated/Revoked comments pt reports he is doing well and sites are healing well. No s.s of infection. Pt does have UTI at this time and PCP is treating with ABT> NO needs.   Call end time 0812            MICHAEL SPICER - Registered Nurse

## 2025-07-17 NOTE — TELEPHONE ENCOUNTER
Orders placed for echo and Holter on 7/14. No openings until 7/24/25. He will have echo that day, followed by Holter. Ok to keep appointment 7/28 or does this need to be changed? Dr. Mcneil will only have results back for echo.

## 2025-07-24 ENCOUNTER — HOSPITAL ENCOUNTER (OUTPATIENT)
Dept: CARDIOLOGY | Facility: HOSPITAL | Age: 80
Discharge: HOME | End: 2025-07-24
Payer: MEDICARE

## 2025-07-24 VITALS
DIASTOLIC BLOOD PRESSURE: 85 MMHG | HEIGHT: 69 IN | SYSTOLIC BLOOD PRESSURE: 141 MMHG | BODY MASS INDEX: 34.21 KG/M2 | WEIGHT: 231 LBS

## 2025-07-24 DIAGNOSIS — I35.0 NONRHEUMATIC AORTIC (VALVE) STENOSIS: ICD-10-CM

## 2025-07-24 DIAGNOSIS — I48.0 PAROXYSMAL ATRIAL FIBRILLATION: ICD-10-CM

## 2025-07-24 LAB
AORTIC DIMENSIONLESS INDEX: 0.65 (DI)
AV MEAN PRESS GRAD SYS DOP V1V2: 8.9 MMHG
AV VMAX SYS DOP: 209.6 CM/SEC
BH CV ECHO MEAS - AO MAX PG: 17.7 MMHG
BH CV ECHO MEAS - AO V2 VTI: 45.8 CM
BH CV ECHO MEAS - EDV(CUBED): 77.8 ML
BH CV ECHO MEAS - EDV(MOD-SP2): 113.1 ML
BH CV ECHO MEAS - EDV(MOD-SP4): 106.5 ML
BH CV ECHO MEAS - EF(MOD-SP2): 74.5 %
BH CV ECHO MEAS - EF(MOD-SP4): 69.9 %
BH CV ECHO MEAS - ESV(CUBED): 41.1 ML
BH CV ECHO MEAS - ESV(MOD-SP2): 28.8 ML
BH CV ECHO MEAS - ESV(MOD-SP4): 32 ML
BH CV ECHO MEAS - FS: 19.2 %
BH CV ECHO MEAS - IVS/LVPW: 0.97 CM
BH CV ECHO MEAS - IVSD: 1.28 CM
BH CV ECHO MEAS - LA DIMENSION: 5.9 CM
BH CV ECHO MEAS - LAT PEAK E' VEL: 12.3 CM/SEC
BH CV ECHO MEAS - LV DIASTOLIC VOL/BSA (35-75): 48.5 CM2
BH CV ECHO MEAS - LV MASS(C)D: 206.9 GRAMS
BH CV ECHO MEAS - LV MAX PG: 6.4 MMHG
BH CV ECHO MEAS - LV MEAN PG: 3.8 MMHG
BH CV ECHO MEAS - LV SYSTOLIC VOL/BSA (12-30): 14.6 CM2
BH CV ECHO MEAS - LV V1 MAX: 126.3 CM/SEC
BH CV ECHO MEAS - LV V1 VTI: 29.9 CM
BH CV ECHO MEAS - LVIDD: 4.3 CM
BH CV ECHO MEAS - LVIDS: 3.5 CM
BH CV ECHO MEAS - LVPWD: 1.33 CM
BH CV ECHO MEAS - MED PEAK E' VEL: 10.8 CM/SEC
BH CV ECHO MEAS - MR MAX PG: 74.6 MMHG
BH CV ECHO MEAS - MR MAX VEL: 431.7 CM/SEC
BH CV ECHO MEAS - MV A MAX VEL: 36.8 CM/SEC
BH CV ECHO MEAS - MV DEC SLOPE: 554.4 CM/SEC2
BH CV ECHO MEAS - MV DEC TIME: 0.26 SEC
BH CV ECHO MEAS - MV E MAX VEL: 132.3 CM/SEC
BH CV ECHO MEAS - MV E/A: 3.6
BH CV ECHO MEAS - MV MAX PG: 13.6 MMHG
BH CV ECHO MEAS - MV MEAN PG: 4.1 MMHG
BH CV ECHO MEAS - MV V2 VTI: 44.6 CM
BH CV ECHO MEAS - PA V2 MAX: 102.5 CM/SEC
BH CV ECHO MEAS - PI END-D VEL: 96.3 CM/SEC
BH CV ECHO MEAS - RAP SYSTOLE: 8 MMHG
BH CV ECHO MEAS - RVSP: 47 MMHG
BH CV ECHO MEAS - SV(MOD-SP2): 84.2 ML
BH CV ECHO MEAS - SV(MOD-SP4): 74.5 ML
BH CV ECHO MEAS - SVI(MOD-SP2): 38.3 ML/M2
BH CV ECHO MEAS - SVI(MOD-SP4): 33.9 ML/M2
BH CV ECHO MEAS - TAPSE (>1.6): 1.39 CM
BH CV ECHO MEAS - TR MAX PG: 39 MMHG
BH CV ECHO MEAS - TR MAX VEL: 312.1 CM/SEC
BH CV ECHO MEASUREMENTS AVERAGE E/E' RATIO: 11.45
LEFT ATRIUM VOLUME INDEX: 52 ML/M2
LEFT ATRIUM VOLUME: 115 ML

## 2025-07-24 PROCEDURE — 93246 EXT ECG>7D<15D RECORDING: CPT

## 2025-07-24 PROCEDURE — 25510000001 PERFLUTREN 6.52 MG/ML SUSPENSION: Performed by: INTERNAL MEDICINE

## 2025-07-24 PROCEDURE — 93306 TTE W/DOPPLER COMPLETE: CPT

## 2025-07-24 RX ADMIN — PERFLUTREN 6.52 MG: 6.52 INJECTION, SUSPENSION INTRAVENOUS at 08:07

## 2025-07-28 ENCOUNTER — OFFICE VISIT (OUTPATIENT)
Dept: CARDIAC SURGERY | Facility: CLINIC | Age: 80
End: 2025-07-28
Payer: MEDICARE

## 2025-07-28 VITALS
OXYGEN SATURATION: 96 % | DIASTOLIC BLOOD PRESSURE: 86 MMHG | WEIGHT: 235.6 LBS | HEART RATE: 67 BPM | BODY MASS INDEX: 34.9 KG/M2 | HEIGHT: 69 IN | SYSTOLIC BLOOD PRESSURE: 138 MMHG

## 2025-07-28 DIAGNOSIS — I35.0 NONRHEUMATIC AORTIC VALVE STENOSIS: Primary | ICD-10-CM

## 2025-07-28 DIAGNOSIS — I35.0 NONRHEUMATIC AORTIC (VALVE) STENOSIS: ICD-10-CM

## 2025-07-28 DIAGNOSIS — Z95.1 S/P CABG X 2: Primary | ICD-10-CM

## 2025-07-28 DIAGNOSIS — I25.10 CORONARY ARTERY DISEASE INVOLVING NATIVE CORONARY ARTERY OF NATIVE HEART WITHOUT ANGINA PECTORIS: Primary | ICD-10-CM

## 2025-07-28 PROCEDURE — 1160F RVW MEDS BY RX/DR IN RCRD: CPT | Performed by: SURGERY

## 2025-07-28 PROCEDURE — 1159F MED LIST DOCD IN RCRD: CPT | Performed by: SURGERY

## 2025-07-28 PROCEDURE — 99024 POSTOP FOLLOW-UP VISIT: CPT | Performed by: SURGERY

## 2025-08-15 LAB
CV ZIO BASELINE AVG BPM: 63 BPM
CV ZIO BASELINE BPM HIGH: 171 BPM
CV ZIO BASELINE BPM LOW: 54 BPM
CV ZIO DEVICE ANALYSIS TIME: NORMAL
CV ZIO ECT SVE COUNT: 394 EPISODES
CV ZIO ECT SVE CPLT COUNT: 0 EPISODES
CV ZIO ECT SVE CPLT FREQ: 0
CV ZIO ECT SVE FREQ: NORMAL
CV ZIO ECT SVE TPLT COUNT: 0 EPISODES
CV ZIO ECT SVE TPLT FREQ: 0
CV ZIO ECT VE COUNT: 535 EPISODES
CV ZIO ECT VE CPLT COUNT: 0 EPISODES
CV ZIO ECT VE CPLT FREQ: 0
CV ZIO ECT VE FREQ: NORMAL
CV ZIO ECT VE TPLT COUNT: 0 EPISODES
CV ZIO ECT VE TPLT FREQ: 0
CV ZIO ECTOPIC SVE COUPLET RAW PERCENT: 0 %
CV ZIO ECTOPIC SVE ISOLATED PERCENT: 0.03 %
CV ZIO ECTOPIC SVE TRIPLET RAW PERCENT: 0 %
CV ZIO ECTOPIC VE COUPLET RAW PERCENT: 0 %
CV ZIO ECTOPIC VE ISOLATED PERCENT: 0.05 %
CV ZIO ECTOPIC VE TRIPLET RAW PERCENT: 0 %
CV ZIO ENROLLMENT END: NORMAL
CV ZIO ENROLLMENT START: NORMAL
CV ZIO PATIENT EVENTS DIARIES: 0
CV ZIO PATIENT EVENTS TRIGGERS: 0
CV ZIO PAUSE COUNT: 0
CV ZIO PRESCRIPTION STATUS: NORMAL
CV ZIO SVT AVG BPM: 159 BPM
CV ZIO SVT BPM HIGH: 171 BPM
CV ZIO SVT BPM LOW: 132 BPM
CV ZIO SVT COUNT: 1
CV ZIO SVT F EPI AVG BPM: 159 BPM
CV ZIO SVT F EPI BEATS: 12 BEATS
CV ZIO SVT F EPI BPM HIGH: 171 BPM
CV ZIO SVT F EPI BPM LOW: 132 BPM
CV ZIO SVT F EPI DUR: 5 SEC
CV ZIO SVT F EPI END: NORMAL
CV ZIO SVT F EPI START: NORMAL
CV ZIO SVT L EPI AVG BPM: 159 BPM
CV ZIO SVT L EPI BEATS: 12 BEATS
CV ZIO SVT L EPI BPM HIGH: 171 BPM
CV ZIO SVT L EPI BPM LOW: 132 BPM
CV ZIO SVT L EPI DUR: 5 SEC
CV ZIO SVT L EPI END: NORMAL
CV ZIO SVT L EPI START: NORMAL
CV ZIO TOTAL  ENROLLMENT PERIOD: NORMAL
CV ZIO VT COUNT: 0

## 2025-09-02 ENCOUNTER — OFFICE VISIT (OUTPATIENT)
Age: 80
End: 2025-09-02

## 2025-09-02 DIAGNOSIS — Z96.641 PRESENCE OF RIGHT ARTIFICIAL HIP JOINT: Primary | ICD-10-CM

## 2025-09-02 PROCEDURE — 99024 POSTOP FOLLOW-UP VISIT: CPT | Performed by: ORTHOPAEDIC SURGERY

## (undated) DEVICE — BIPOLAR SEALER 23-112-1 AQM 6.0: Brand: AQUAMANTYS ®

## (undated) DEVICE — BLAKE CARDIO CONNECTOR 1:1: Brand: J-VAC

## (undated) DEVICE — GLOVE SURG SZ 75 CRM LTX FREE POLYISOPRENE POLYMER BEAD ANTI

## (undated) DEVICE — BLAKE SILICONE DRAINS CARDIO CONNECTOR 2:1: Brand: BLAKE

## (undated) DEVICE — PATIENT RETURN ELECTRODE, SINGLE-USE, CONTACT QUALITY MONITORING, ADULT, WITH 9FT CORD, FOR PATIENTS WEIGING OVER 33LBS. (15KG): Brand: MEGADYNE

## (undated) DEVICE — PEN ABLAT ISOL TRNSPOLAR 19CM

## (undated) DEVICE — CATH IV ANGIO FEP 14GA 5.25IN ORNG 10PK

## (undated) DEVICE — OPTIFOAM GENTLE SA, POSTOP, 4X8: Brand: MEDLINE

## (undated) DEVICE — SUT SILK 2/0 SH CR8 18IN CR8 C012D

## (undated) DEVICE — TUBE ET 7.5MM NSL ORAL BASIC CUF INTMED MURPHY EYE RADPQ

## (undated) DEVICE — ADHS LIQ MASTISOL 2/3ML

## (undated) DEVICE — SUTURE VICRYL + SZ 3-0 L27IN ABSRB UD L26MM SH 1/2 CIR VCP416H

## (undated) DEVICE — LARYNGOSCOPE VID MILLER 2 MTL BLADE M HNDL CURAPLEX

## (undated) DEVICE — SUT SILK 2/0 FS BLK 18IN 685G

## (undated) DEVICE — BAPTIST TURNOVER KIT: Brand: MEDLINE INDUSTRIES, INC.

## (undated) DEVICE — UNDERGLOVE SURG SZ 8 FNGR THK0.21MIL GRN LTX BEAD CUF

## (undated) DEVICE — LIGHT SUCT UNTETHERED SCINTILLANT

## (undated) DEVICE — TB SXN SURG DLP MALL/CARD SFT/TP 6F 6IN

## (undated) DEVICE — PIN FIX L229MM DIA4.8MM S STL STYL 6 DMND 1 END THRD STNMN
Type: IMPLANTABLE DEVICE | Site: HIP | Status: NON-FUNCTIONAL
Removed: 2024-05-06

## (undated) DEVICE — GUIDE SELECT ATRICLIP

## (undated) DEVICE — PK SET UP ANES OPN HEART 30

## (undated) DEVICE — SUTURE VICRYL + SZ 0 L27IN ABSRB UD CT-1 L36MM 1/2 CIR TAPR VCP260H

## (undated) DEVICE — NEPTUNE E-SEP SMOKE EVACUATION PENCIL, COATED, 70MM BLADE, ROCKER SWITCH: Brand: NEPTUNE E-SEP

## (undated) DEVICE — SYR LUERLOK 20CC BX/50

## (undated) DEVICE — CLTH CLENS READYCLEANSE PERI CARE PK/5

## (undated) DEVICE — BG OR ZSUT SADDLE 20IN CLR STRL

## (undated) DEVICE — GLOVE SURG SZ 85 CRM LTX FREE POLYISOPRENE POLYMER BEAD ANTI

## (undated) DEVICE — DRSNG WND SIL OPTIFOAM GNTL BRDR ADHS 1.6X2IN

## (undated) DEVICE — E-PACK PROCEDURE KIT: Brand: E-PACK

## (undated) DEVICE — Device

## (undated) DEVICE — TOTAL TRAY, 16FR 10ML SIL FOLEY, URN: Brand: MEDLINE

## (undated) DEVICE — OASIS DRAIN, SINGLE, INLINE & ATS COMPATIBLE: Brand: OASIS

## (undated) DEVICE — SYS VASOVIEW2 HEMOPRO ENDOSCOPIC HARVST VESL

## (undated) DEVICE — MARKER,SKIN,WI/RULER AND LABELS: Brand: MEDLINE

## (undated) DEVICE — TRAP FLD MINIVAC MEGADYNE 100ML

## (undated) DEVICE — GLOVE SURG SZ 8 L12IN FNGR THK79MIL GRN LTX FREE

## (undated) DEVICE — ROTATING SURGICAL PUNCHES, 1 PER POUCH: Brand: A&E MEDICAL / ROTATING SURGICAL PUNCHES

## (undated) DEVICE — SUTURE VICRYL ABSRB BRAID COAT UD CP NO 2 27IN  J195H

## (undated) DEVICE — 1/2 FORCE SURGICAL SPRING CLIP: Brand: STEALTH® SPRING CLIP

## (undated) DEVICE — CLMP ABLAT ISOLATORSYNERGY TRNSPOLAR

## (undated) DEVICE — SCANLAN® SURG-I-PAW® INSTRUMENT COVERS - RED, 1/10" X 5"/ 3 MMX13 CM (2 - 5" PCS /PKG): Brand: SCANLAN® SURG-I-PAW® INSTRUMENT COVERS

## (undated) DEVICE — E-Z CLEAN, NON-STICK, PTFE COATED, ELECTROSURGICAL BLADE ELECTRODE, 6.5 INCH (16.5 CM): Brand: MEGADYNE

## (undated) DEVICE — ELECTRD BLD EZ CLN MOD 4IN

## (undated) DEVICE — ADHESIVE SKIN CLOSURE WND 8.661X1.5 IN 22 CM LIQUIBAND SECUR

## (undated) DEVICE — SUT ETHIB 2/0 SH SH 36IN X523H

## (undated) DEVICE — PK OPN HEART 30

## (undated) DEVICE — 6 FOOT DISPOSABLE EXTENSION CABLE WITH SAFE CONNECT / SCREW-DOWN

## (undated) DEVICE — ELECTRD DEFIB M/FUNC GEL LIQ RL PROPADZ

## (undated) DEVICE — ORGANIZER SUT SHELIGH 3T 213013

## (undated) DEVICE — FAN SPRAY KIT: Brand: PULSAVAC®

## (undated) DEVICE — COVER POS PERINL POST NS 082501

## (undated) DEVICE — KIT,ANTI FOG,W/SPONGE & FLUID,SOFT PACK: Brand: MEDLINE

## (undated) DEVICE — GLOVE SURG SZ 85 L12IN FNGR THK79MIL GRN LTX FREE

## (undated) DEVICE — ELECTRD BLD MEGADYNE EZCLEAN STD 2.75IN XLNG

## (undated) DEVICE — SUT POLY BR TP 2STRND 1/8X30IN

## (undated) DEVICE — CATH ART FEM ST 18G 10.8CM

## (undated) DEVICE — SYS PROB ABL/CRYO CRYOSFORM CORRUGATED 10IN

## (undated) DEVICE — DUAL CUT SAGITTAL BLADE

## (undated) DEVICE — SYS DISTNTION VEIN BONCHEK 300MMHG

## (undated) DEVICE — WR SUT NONABS MF SS V40 1/2CIR TC 6/0 18IN M649G: Type: IMPLANTABLE DEVICE | Site: CHEST | Status: NON-FUNCTIONAL

## (undated) DEVICE — ROYAL SILK SURGICAL GOWN, XXL: Brand: CONVERTORS

## (undated) DEVICE — SUT PROLN 4/0 RB1 36IN 4PK M8557

## (undated) DEVICE — Device: Brand: RETRACT-O-TAPE 18G X 30.5CM BLUNT NEEDLE

## (undated) DEVICE — GLV SURG BIOGEL M LTX PF 7 1/2

## (undated) DEVICE — ST TBG PNEUMOCLEAR EVAC SMOKE HIFLO

## (undated) DEVICE — STERNUM BLADE, OFFSET (32.0 X 0.8 X 6.3MM)

## (undated) DEVICE — OPTIFOAM GENTLE SA, POSTOP, 4X12: Brand: MEDLINE

## (undated) DEVICE — 6.3MM ROUND FAST CUTTING BUR

## (undated) DEVICE — DRAIN,WOUND,ROUND,24FR,5/16",FULL-FLUTED: Brand: MEDLINE

## (undated) DEVICE — BLD SCLPL BEAVR MINI STR 2BVL 180D LF

## (undated) DEVICE — PACK ANT HIP CDS

## (undated) DEVICE — NDL CNTR 40CT FM MAG: Brand: MEDLINE INDUSTRIES, INC.

## (undated) DEVICE — ANTIBACTERIAL UNDYED BRAIDED (POLYGLACTIN 910), SYNTHETIC ABSORBABLE SUTURE: Brand: COATED VICRYL

## (undated) DEVICE — GLOVE SURG SZ 85 L12IN FNGR ORTHO 126MIL CRM LTX FREE

## (undated) DEVICE — STRIP,CLOSURE,WOUND,MEDI-STRIP,1/2X4: Brand: MEDLINE

## (undated) DEVICE — INTENDED FOR TISSUE SEPARATION, AND OTHER PROCEDURES THAT REQUIRE A SHARP SURGICAL BLADE TO PUNCTURE OR CUT.: Brand: BARD-PARKER ® STAINLESS STEEL BLADES

## (undated) DEVICE — SUTURE VICRYL + SZ 2-0 L36IN ABSRB UD L36MM CT-1 1/2 CIR VCP945H